# Patient Record
Sex: MALE | Race: WHITE | Employment: OTHER | URBAN - METROPOLITAN AREA
[De-identification: names, ages, dates, MRNs, and addresses within clinical notes are randomized per-mention and may not be internally consistent; named-entity substitution may affect disease eponyms.]

---

## 2017-12-25 PROBLEM — G93.40 ACUTE ENCEPHALOPATHY: Status: ACTIVE | Noted: 2017-12-25

## 2017-12-29 PROBLEM — M54.9 CVA TENDERNESS: Status: ACTIVE | Noted: 2017-12-29

## 2017-12-31 PROBLEM — R26.9 GAIT DISTURBANCE: Status: ACTIVE | Noted: 2017-12-31

## 2018-01-02 PROBLEM — F10.20 ALCOHOLISM (HCC): Status: ACTIVE | Noted: 2018-01-02

## 2018-01-19 ENCOUNTER — HOSPITAL ENCOUNTER (OUTPATIENT)
Dept: PHYSICAL THERAPY | Age: 58
Discharge: HOME | End: 2018-01-31
Attending: PHYSICAL MEDICINE & REHABILITATION | Admitting: PHYSICAL MEDICINE & REHABILITATION

## 2018-01-19 ENCOUNTER — HOSPITAL ENCOUNTER (OUTPATIENT)
Dept: OCCUPATIONAL THERAPY | Age: 58
Discharge: HOME | End: 2018-01-31
Attending: PHYSICAL MEDICINE & REHABILITATION | Admitting: PHYSICAL MEDICINE & REHABILITATION

## 2018-01-19 NOTE — FLOWSHEET NOTE
Physical Therapy Daily Treatment Note  Date:  2018    Patient Name:  Ramakrishna Bui    :  1960  MRN: [id number]  Restrictions/Precautions:  Fall risk  Diagnosis: ischemic stroke w/infarction  Treatment Diagnosis: ataxic gait, imp. function, left body weakness, imp. coordination  Insurance/Certification information:  [place]  30 pcy    G-Coded  Next Physician Visit: Unknown  Referring Physician:  Mohamud EDGAR EXPIRATION DATE:  12 visits  Visit# / total visits:                   Initial Pain level: 0/10       G-Code Selection: (On Eval and every 10th visit or Discharge)  Initial evaluation completed 18    MEASURE    [x] Mobility: Walking and Moving Around     [x] Current ()   [x] Goal ()   [] DC ()  [] Changing/Maintaining Body Position     [] Current (7449)      [] Goal ()   [] DC ()  [] Carrying / Moving / Handling Objects     [] Current ()   [] Goal ()   [] DC ()  [] Self-Care     [] Current ()   [] Goal ()   [] DC ()  [] Other PT/OT primary DX     [] Current ()   [] Goal ()   [] DC ()    SEVERITY    CURRENT  GOAL  DISCHARGE   [] CH (0% Impaired, Indep.)  [] CI (1-19% Impaired, SBA-CGA)  [] CJ (20-39% Impaired, MIN A)  [x] CK  (40-59% Impairment, Mod A)  [] CL  (60-79% Impairment, Max A)  [] CM  (80-99% Impairment, Dep.)   [] CN  (100% Impairment, Tot Dep.) [] CH (0% Impaired, Indep.)  [] CI (1-19% Impaired, SBA-CGA)  [x] CJ (20-39% Impaired, MIN A)  [] CK  (40-59% Impairment, Mod A)  [] CL  (60-79% Impairment, Max A)  [] CM  (80-99% Impairment, Dep.)   [] CN  (100% Impairment, Tot Dep.)  [] CH (0% Impaired, Indep.)  [] CI (1-19% Impaired, SBA-CGA)  [] CJ (20-39% Impaired, MIN A)  [] CK  (40-59% Impairment, Mod A)  [] CL  (60-79% Impairment, Max A)  [] CM  (80-99% Impairment, Dep.)   [] CN  (100% Impairment, Tot Dep.)            Subjective:   Patient had an ischemic stroke a couple days before [place], but

## 2018-01-19 NOTE — PROGRESS NOTES
Although there were no cognitive deficits identified during PT evaluation, there is report by family that judgement and STM are typically impaired. Patient will benefit from physical therapy for therex, gait/balance retraining, mobility/transfer training, HEP and education. Treatment Diagnosis: ataxic gait, imp. function, left body weakness, imp. coordination  Prognosis: Good  History: HTN, CVA, ETOH abuse  Exam: MMT, coordination, gait, balance  Clinical Presentation: Evolving  Patient Education: see daily note  Barriers to Learning: memory def  REQUIRES PT FOLLOW UP: Yes  Treatment Initiated : see daily note  Activity Tolerance  Activity Tolerance: Patient Tolerated treatment well         Plan   Plan  Times per week: 2x/wk  Plan weeks: 6 weeks or 12 visits  Current Treatment Recommendations: Strengthening, Functional Mobility Training, Balance Training, Transfer Training, ADL/Self-care Training, Stair training, Gait Training, Neuromuscular Re-education, ROM, Home Exercise Program, Safety Education & Training, Patient/Caregiver Education & Training, Equipment Evaluation, Education, & procurement  Safety Devices  Type of devices: Patient at risk for falls    G-Code  PT G-Codes  Functional Assessment Tool Used: Tinetti Gait and Balance  Score: 15/28 (46.5% disability)  Functional Limitation: Mobility: Walking and moving around  Mobility: Walking and Moving Around Current Status (): At least 40 percent but less than 60 percent impaired, limited or restricted  Mobility: Walking and Moving Around Goal Status (): At least 20 percent but less than 40 percent impaired, limited or restricted    Goals  Long term goals  Time Frame for Long term goals : In 6 weeks or 12 visits, patient will  Long term goal 1: demonstrate compliance and independence w/HEP. Long term goal 2: score b/t 19-23 or better on Tinetti Balance and Gait score.   Long term goal 3: ascend/descend (1) full flight of stairs, using (1) HR for

## 2018-01-19 NOTE — FLOWSHEET NOTE
[x][facility] [address]      FREDERICK PIZANO [facility] [address] [place] [address]       [facility] [address], [address]       [place] [address]     [phone] [phone] [phone]  ________________________________________________________________________  Occupational Therapy Daily Treatment Note    Date:  2018  Patient Name:  Yani Link    :  1960  Restrictions/Precautions:  Left visual field cut  Diagnosis:   CVA  Treatment Diagnosis:  lack of coordination  Insurance/Certification information:  [place] but in process of changing  Referring Physician:   Dr. Edouard Page of care signed (Y/N):    Visit# / total visits: 1/10  Pain level: 0/10     Subjective:   Prior Level of Function:  independent  Patient Goals: get back to living independently at own home    Treatment Flowsheet   Right Left            Completed eval                                                                                                                              Interventions/Modalities used:  [] Therapeutic Exercise   [] Modalities:  [] Therapeutic Activity    [] Ultrasound [] Elec Stimulation   [] Total Motion Release    [] Fluido [] Kinesiotaping  [] Neuromuscular Re-education   [] Ionto [] Coldpack/hotpack   [] Instruction in HEP    Other:    Objective Findings:    Communication with other providers:    Education provided to patient:    Adverse Reactions to treatment:    Timed Code Treatment Minutes: Total Treatment Minutes:      Treatment/Activity Tolerance:     []  Patient tolerated treatment well []  Patient limited by fatique    []  Patient limited by pain []  Patient limited by other medical complications   []  Other:     Goals:    Long term goal 1: Pt. will increase L  10# to open a jar. Long term goal 2: Pt. will increase L UE strength to 5/5 to perform activities of choice.   Long term goal 3: Pt. will

## 2018-01-19 NOTE — PROGRESS NOTES
Current Status (): At least 20 percent but less than 40 percent impaired, limited or restricted  Carrying, Moving and Handling Objects Goal Status (): At least 1 percent but less than 20 percent impaired, limited or restricted  OutComes Score  9 hole peg test    Goals  Long term goals  Time Frame for Long term goals : 4 weeks  Long term goal 1: Pt. will increase L  10# to open a jar. Long term goal 2: Pt. will increase L UE strength to 5/5 to perform activities of choice. Long term goal 3: Pt. will increase L hand coordination as evidenced by 9 hole peg test.  Long term goal 4: Pt. will perform homemaking tasks independently and safely to be able to return to own home independently.        Therapy Time   Individual Concurrent Group Co-treatment   Time In 9202         Time Out 1115         Minutes Sanjuana 1 OTR/L

## 2018-01-24 ENCOUNTER — HOSPITAL ENCOUNTER (OUTPATIENT)
Dept: OCCUPATIONAL THERAPY | Age: 58
Discharge: HOME | End: 2018-01-24
Admitting: PHYSICAL MEDICINE & REHABILITATION

## 2018-01-26 ENCOUNTER — HOSPITAL ENCOUNTER (OUTPATIENT)
Dept: OCCUPATIONAL THERAPY | Age: 58
Discharge: HOME | End: 2018-01-26
Admitting: PHYSICAL MEDICINE & REHABILITATION

## 2018-01-26 NOTE — FLOWSHEET NOTE
[x][facility] [address]      FREDERICK PIZANO [facility] [address]       [facility], [address], [address]       [place] [address]     [phone] [phone] [phone]  ________________________________________________________________________  Occupational Therapy Daily Treatment Note    Date:  2018  Patient Name:  Taylor Moncada    :  1960  Restrictions/Precautions:  Left visual field cut  Diagnosis:   CVA  Treatment Diagnosis:  lack of coordination  Insurance/Certification information:  [place] but in process of changing  Referring Physician:   Dr. Abdelrahman Shepard of care signed (Y/N):    Visit# / total visits: 3/10  Pain level: 0/10     Subjective: pt. States he is getting used to scanning his environment more to compensate for the L visual field cut  Prior Level of Function:  independent  Patient Goals: get back to living independently at own home    Treatment Flowsheet   Right Left          Worked on various therapeutic tasks focused on dynamic standing balance, L UE coordination and L visual scanning. Ball toss  Obstacle course  Balloon volleyball  Clothespins to yardstick            Pt.  Required occ'l min A to regain balance during obstacle course when stepping on dense foam and uneven surfaces              Pt. Also had min difficulty finding objects specifically positioned in his left visual field                                                                                    Interventions/Modalities used:  [x] Therapeutic Exercise   [] Modalities:  [x] Therapeutic Activity    [] Ultrasound [] Elec Stimulation   [] Total Motion Release    [] Fluido [] Kinesiotaping  [x] Neuromuscular Re-education   [] Ionto [] Coldpack/hotpack   [] Instruction in HEP    Other:    Objective Findings:    Communication with other providers:    Education provided to patient:    Adverse Reactions to

## 2018-01-31 ENCOUNTER — HOSPITAL ENCOUNTER (OUTPATIENT)
Dept: OCCUPATIONAL THERAPY | Age: 58
Discharge: HOME | End: 2018-01-31
Admitting: PHYSICAL MEDICINE & REHABILITATION

## 2018-01-31 NOTE — FLOWSHEET NOTE
[x][facility] [address]      FREDERICK [facility] [address]       [facility] [address], [address]       [place] [address]     [phone] [phone] [phone]  ________________________________________________________________________  Occupational Therapy Daily Treatment Note    Date:  2018  Patient Name:  Steph King    :  1960  Restrictions/Precautions:  Left visual field cut  Diagnosis:   CVA  Treatment Diagnosis:  lack of coordination  Insurance/Certification information:  [place] but in process of changing  Referring Physician:   Dr. Leora Darnell of care signed (Y/N):    Visit# / total visits: 4/10  Pain level: 0/10     Subjective: states his coffee pot malfunctioned and he used half a roll of paper towel cleaning up  Prior Level of Function:  independent  Patient Goals: get back to living independently at own home    Treatment Flowsheet   Right Left          Practiced folding clothes with min difficulty x x        Practiced putting polo shirts on hangers and hanging on rack with mod difficulty; demonstrated mod difficulty  With vision and perception x x        Matched puzzle pieces with game of perfection using L hand  x        Balloon raquetball  x                                                                                  Interventions/Modalities used:  [x] Therapeutic Exercise   [] Modalities:  [x] Therapeutic Activity    [] Ultrasound [] Elec Stimulation   [] Total Motion Release    [] Fluido [] Kinesiotaping  [x] Neuromuscular Re-education   [] Ionto [] Coldpack/hotpack   [] Instruction in HEP    Other:    Objective Findings:    Cues needed for left side awareness/ attention to detail    Communication with other providers:    Education provided to patient:    Adverse Reactions to treatment:    Timed Code Treatment Minutes:  45    Total Treatment Minutes: 45    Treatment/Activity Tolerance:     [x]  Patient tolerated treatment well []  Patient limited by fatique    []  Patient limited by pain []  Patient limited by other medical complications   []  Other:     Goals:    Long term goal 1: Pt. will increase L  10# to open a jar. Long term goal 2: Pt. will increase L UE strength to 5/5 to perform activities of choice. Long term goal 3: Pt. will increase L hand coordination as evidenced by 9 hole peg test.  Long term goal 4: Pt. will perform homemaking tasks independently and safely to be able to return to own home independently.      G-Code Selection: (On Eval and every 10th visit or Discharge)    MEASURE    [] Mobility: Walking and Moving Around     [] Current ()   [] Goal ()   [] DC ()  [] Changing/Maintaining Body Position     [] Current (0832)      [] Goal ()   [] DC ()  [x] Carrying / Moving / Handling Objects     [] Current ()   [] Goal ()   [] DC ()  [] Self-Care     [] Current ()   [] Goal ()   [] DC ()  [] Other PT/OT primary DX     [] Current ()   [] Goal ()   [] DC ()    SEVERITY    CURRENT  GOAL  DISCHARGE   [] CH (0% Impaired, Indep.)  [] CI (1-19% Impaired, SBA-CGA)  [x] CJ (20-39% Impaired, MIN A)  [] CK  (40-59% Impairment, Mod A)  [] CL  (60-79% Impairment, Max A)  [] CM  (80-99% Impairment, Dep.)   [] CN  (100% Impairment, Tot Dep.) [] CH (0% Impaired, Indep.)  [x] CI (1-19% Impaired, SBA-CGA)  [] CJ (20-39% Impaired, MIN A)  [] CK  (40-59% Impairment, Mod A)  [] CL  (60-79% Impairment, Max A)  [] CM  (80-99% Impairment, Dep.)   [] CN  (100% Impairment, Tot Dep.)  [] CH (0% Impaired, Indep.)  [] CI (1-19% Impaired, SBA-CGA)  [] CJ (20-39% Impaired, MIN A)  [] CK  (40-59% Impairment, Mod A)  [] CL  (60-79% Impairment, Max A)  [] CM  (80-99% Impairment, Dep.)   [] CN  (100% Impairment, Tot Dep.)          Patient Requires Follow-up:  [x]  Yes  []  No    Plan: [x]  Continue per plan

## 2018-02-01 ENCOUNTER — HOSPITAL ENCOUNTER (OUTPATIENT)
Dept: OTHER | Age: 58
Discharge: HOME | End: 2018-02-28
Attending: PHYSICAL MEDICINE & REHABILITATION | Admitting: PHYSICAL MEDICINE & REHABILITATION

## 2018-02-02 ENCOUNTER — HOSPITAL ENCOUNTER (OUTPATIENT)
Dept: PHYSICAL THERAPY | Age: 58
Discharge: HOME | End: 2018-02-02
Admitting: PHYSICAL MEDICINE & REHABILITATION

## 2018-02-02 ENCOUNTER — HOSPITAL ENCOUNTER (OUTPATIENT)
Dept: OCCUPATIONAL THERAPY | Age: 58
Discharge: HOME | End: 2018-02-02
Admitting: PHYSICAL MEDICINE & REHABILITATION

## 2018-02-05 ENCOUNTER — OFFICE VISIT (OUTPATIENT)
Dept: FAMILY MEDICINE CLINIC | Age: 58
End: 2018-02-05

## 2018-02-05 VITALS
BODY MASS INDEX: 21.17 KG/M2 | WEIGHT: 124 LBS | OXYGEN SATURATION: 98 % | HEART RATE: 95 BPM | HEIGHT: 64 IN | TEMPERATURE: 97.5 F | SYSTOLIC BLOOD PRESSURE: 120 MMHG | DIASTOLIC BLOOD PRESSURE: 72 MMHG

## 2018-02-05 DIAGNOSIS — Z76.89 ESTABLISHING CARE WITH NEW DOCTOR, ENCOUNTER FOR: ICD-10-CM

## 2018-02-05 DIAGNOSIS — Z87.891 FORMER SMOKER: ICD-10-CM

## 2018-02-05 DIAGNOSIS — E83.42 HYPOMAGNESEMIA: Primary | ICD-10-CM

## 2018-02-05 DIAGNOSIS — F10.20 ALCOHOLISM (HCC): ICD-10-CM

## 2018-02-05 DIAGNOSIS — D64.9 ANEMIA, UNSPECIFIED TYPE: ICD-10-CM

## 2018-02-05 LAB
BASOPHILS ABSOLUTE: 0.1 K/UL (ref 0–0.2)
BASOPHILS RELATIVE PERCENT: 0.7 %
EOSINOPHILS ABSOLUTE: 0.2 K/UL (ref 0–0.6)
EOSINOPHILS RELATIVE PERCENT: 1.4 %
HCT VFR BLD CALC: 36 % (ref 40.5–52.5)
HEMOGLOBIN: 12 G/DL (ref 13.5–17.5)
LYMPHOCYTES ABSOLUTE: 2.3 K/UL (ref 1–5.1)
LYMPHOCYTES RELATIVE PERCENT: 21.4 %
MCH RBC QN AUTO: 33.1 PG (ref 26–34)
MCHC RBC AUTO-ENTMCNC: 33.5 G/DL (ref 31–36)
MCV RBC AUTO: 98.8 FL (ref 80–100)
MONOCYTES ABSOLUTE: 1.1 K/UL (ref 0–1.3)
MONOCYTES RELATIVE PERCENT: 9.8 %
NEUTROPHILS ABSOLUTE: 7.2 K/UL (ref 1.7–7.7)
NEUTROPHILS RELATIVE PERCENT: 66.7 %
PDW BLD-RTO: 13.1 % (ref 12.4–15.4)
PLATELET # BLD: 412 K/UL (ref 135–450)
PMV BLD AUTO: 8.4 FL (ref 5–10.5)
RBC # BLD: 3.64 M/UL (ref 4.2–5.9)
WBC # BLD: 10.8 K/UL (ref 4–11)

## 2018-02-05 PROCEDURE — G8598 ASA/ANTIPLAT THER USED: HCPCS | Performed by: FAMILY MEDICINE

## 2018-02-05 PROCEDURE — G8420 CALC BMI NORM PARAMETERS: HCPCS | Performed by: FAMILY MEDICINE

## 2018-02-05 PROCEDURE — 1036F TOBACCO NON-USER: CPT | Performed by: FAMILY MEDICINE

## 2018-02-05 PROCEDURE — G8427 DOCREV CUR MEDS BY ELIG CLIN: HCPCS | Performed by: FAMILY MEDICINE

## 2018-02-05 PROCEDURE — 36415 COLL VENOUS BLD VENIPUNCTURE: CPT | Performed by: FAMILY MEDICINE

## 2018-02-05 PROCEDURE — 1111F DSCHRG MED/CURRENT MED MERGE: CPT | Performed by: FAMILY MEDICINE

## 2018-02-05 PROCEDURE — 99204 OFFICE O/P NEW MOD 45 MIN: CPT | Performed by: FAMILY MEDICINE

## 2018-02-05 PROCEDURE — 3017F COLORECTAL CA SCREEN DOC REV: CPT | Performed by: FAMILY MEDICINE

## 2018-02-05 PROCEDURE — G8484 FLU IMMUNIZE NO ADMIN: HCPCS | Performed by: FAMILY MEDICINE

## 2018-02-05 RX ORDER — ATORVASTATIN CALCIUM 40 MG/1
40 TABLET, FILM COATED ORAL NIGHTLY
Qty: 90 TABLET | Refills: 0 | Status: SHIPPED | OUTPATIENT
Start: 2018-02-05 | End: 2018-05-07 | Stop reason: SDUPTHER

## 2018-02-05 RX ORDER — FLUOXETINE 10 MG/1
10 CAPSULE ORAL DAILY
Qty: 90 CAPSULE | Refills: 0 | Status: SHIPPED | OUTPATIENT
Start: 2018-02-05 | End: 2018-05-04 | Stop reason: SDUPTHER

## 2018-02-05 ASSESSMENT — ENCOUNTER SYMPTOMS
NAUSEA: 0
COUGH: 0
ABDOMINAL PAIN: 0
DIARRHEA: 0
SHORTNESS OF BREATH: 0
VOMITING: 0

## 2018-02-05 NOTE — PROGRESS NOTES
Abdominal: Soft. Bowel sounds are normal. He exhibits no mass. There is no tenderness. Musculoskeletal: He exhibits no edema. Lymphadenopathy:     He has no cervical adenopathy. Neurological: He is alert and oriented to person, place, and time. He has normal reflexes. No cranial nerve deficit. Strength testing: Right side of body is normal.  Left side of body shows weakness of triceps hip flexors and extenders and knee extenders and flexors. These weaknesses are all about 4/5. Psychiatric: He has a normal mood and affect. Nursing note and vitals reviewed. Assessment:      1. Hypomagnesemia  COMPREHENSIVE METABOLIC PANEL    MAGNESIUM   2. Hemiparesis of left nondominant side due to cerebral infarction (HCC)  COMPREHENSIVE METABOLIC PANEL    atorvastatin (LIPITOR) 40 MG tablet    FLUoxetine (PROZAC) 10 MG capsule    External Referral To Neurology   3. Anemia, unspecified type  CBC WITH AUTO DIFFERENTIAL    IRON AND TIBC    VITAMIN B12 & FOLATE   4. Alcoholism (HCC)  COMPREHENSIVE METABOLIC PANEL    IRON AND TIBC    VITAMIN B12 & FOLATE   5. Former smoker     6. Establishing care with new doctor, encounter for             Plan:      1. Magnesium level obtained. Further care pending results. 2.  Discussed secondary prevention versus cholesterol management for atorvastatin. He will continue on atorvastatin definitely. Lipid levels only need to be done at most once a year. LFTs done today to make sure no liver irritation from his chronic alcoholism and new atorvastatin prescription. Continue baby aspirin daily. We'll refer to neurology for further evaluation. Patient is pursuing disability given his left-sided issues soon he will be on to get this. 3.  Labs to evaluate level of anemia and potential causes. If the R75 and folic acid truly are low normal vitamin prior auth his Foltx. 4.  Currently in remission he was encouraged to continue so.   My concern is once he is back in to his old home with the remain abstinent. 5.  States he only smokes when he drinks. So for now he is not smoking. We'll continue to follow this. I congratulated him on smoking cessation. 6.  Visit to establish care.     Follow-up: 3 months, CVA, alcohol use, smoking

## 2018-02-06 LAB
A/G RATIO: 1.6 (ref 1.1–2.2)
ALBUMIN SERPL-MCNC: 4.2 G/DL (ref 3.4–5)
ALP BLD-CCNC: 116 U/L (ref 40–129)
ALT SERPL-CCNC: 11 U/L (ref 10–40)
ANION GAP SERPL CALCULATED.3IONS-SCNC: 16 MMOL/L (ref 3–16)
AST SERPL-CCNC: 17 U/L (ref 15–37)
BILIRUB SERPL-MCNC: 0.5 MG/DL (ref 0–1)
BUN BLDV-MCNC: 7 MG/DL (ref 7–20)
CALCIUM SERPL-MCNC: 9.8 MG/DL (ref 8.3–10.6)
CHLORIDE BLD-SCNC: 94 MMOL/L (ref 99–110)
CO2: 27 MMOL/L (ref 21–32)
CREAT SERPL-MCNC: 0.8 MG/DL (ref 0.9–1.3)
FOLATE: >20 NG/ML (ref 4.78–24.2)
GFR AFRICAN AMERICAN: >60
GFR NON-AFRICAN AMERICAN: >60
GLOBULIN: 2.6 G/DL
GLUCOSE BLD-MCNC: 86 MG/DL (ref 70–99)
IRON SATURATION: 28 % (ref 20–50)
IRON: 64 UG/DL (ref 59–158)
MAGNESIUM: 2 MG/DL (ref 1.8–2.4)
POTASSIUM SERPL-SCNC: 4.9 MMOL/L (ref 3.5–5.1)
SODIUM BLD-SCNC: 137 MMOL/L (ref 136–145)
TOTAL IRON BINDING CAPACITY: 229 UG/DL (ref 260–445)
TOTAL PROTEIN: 6.8 G/DL (ref 6.4–8.2)
VITAMIN B-12: 455 PG/ML (ref 211–911)

## 2018-02-07 ENCOUNTER — HOSPITAL ENCOUNTER (OUTPATIENT)
Dept: OCCUPATIONAL THERAPY | Age: 58
Discharge: HOME | End: 2018-02-07
Admitting: PHYSICAL MEDICINE & REHABILITATION

## 2018-02-07 NOTE — FLOWSHEET NOTE
treatment well []  Patient limited by fatique    []  Patient limited by pain []  Patient limited by other medical complications   []  Other:     Goals:    Long term goal 1: Pt. will increase L  10# to open a jar. Long term goal 2: Pt. will increase L UE strength to 5/5 to perform activities of choice. Long term goal 3: Pt. will increase L hand coordination as evidenced by 9 hole peg test.  Long term goal 4: Pt. will perform homemaking tasks independently and safely to be able to return to own home independently.      G-Code Selection: (On Eval and every 10th visit or Discharge)    MEASURE    [] Mobility: Walking and Moving Around     [] Current ()   [] Goal ()   [] DC ()  [] Changing/Maintaining Body Position     [] Current (6379)      [] Goal ()   [] DC ()  [x] Carrying / Moving / Handling Objects     [] Current ()   [] Goal ()   [] DC ()  [] Self-Care     [] Current ()   [] Goal ()   [] DC ()  [] Other PT/OT primary DX     [] Current ()   [] Goal ()   [] DC ()    SEVERITY    CURRENT  GOAL  DISCHARGE   [] CH (0% Impaired, Indep.)  [] CI (1-19% Impaired, SBA-CGA)  [x] CJ (20-39% Impaired, MIN A)  [] CK  (40-59% Impairment, Mod A)  [] CL  (60-79% Impairment, Max A)  [] CM  (80-99% Impairment, Dep.)   [] CN  (100% Impairment, Tot Dep.) [] CH (0% Impaired, Indep.)  [x] CI (1-19% Impaired, SBA-CGA)  [] CJ (20-39% Impaired, MIN A)  [] CK  (40-59% Impairment, Mod A)  [] CL  (60-79% Impairment, Max A)  [] CM  (80-99% Impairment, Dep.)   [] CN  (100% Impairment, Tot Dep.)  [] CH (0% Impaired, Indep.)  [] CI (1-19% Impaired, SBA-CGA)  [] CJ (20-39% Impaired, MIN A)  [] CK  (40-59% Impairment, Mod A)  [] CL  (60-79% Impairment, Max A)  [] CM  (80-99% Impairment, Dep.)   [] CN  (100% Impairment, Tot Dep.)          Patient Requires Follow-up:  [x]  Yes  []  No    Plan: [x]  Continue per plan of care []  Alter current plan (see comments)   []  Plan of

## 2018-02-09 ENCOUNTER — HOSPITAL ENCOUNTER (OUTPATIENT)
Dept: PHYSICAL THERAPY | Age: 58
Discharge: HOME | End: 2018-02-09
Admitting: PHYSICAL MEDICINE & REHABILITATION

## 2018-02-09 ENCOUNTER — HOSPITAL ENCOUNTER (OUTPATIENT)
Dept: OCCUPATIONAL THERAPY | Age: 58
Discharge: HOME | End: 2018-02-09
Admitting: PHYSICAL MEDICINE & REHABILITATION

## 2018-02-09 NOTE — FLOWSHEET NOTE
a little sore today. AT EVAL:  Patient had an ischemic stroke a couple days before [place], but unable to recall any details. Family attempted getting ahold of him for a couple days and unable to contact patient. Then they knew something was wrong. When family entered the house, patient was on the floor having a seizure. Pt has not had strokes in the past/acute onset. Patient lived alone and was independent w/adls, mobility and self-care, drove although brother confided to therapist that patient has been heavily drinking for the last three years. Per pt's brother, patient is actually walking better now than he was because he is not currently drinking. Currently, living w/brother and not driving. Family is provinding 24 hr supervision. Most difficulty is left body weakness/neglect and dec STM. Has lost some left peripheral vision  Any changes to Ambulatory Summary Sheet? Goals:     Long term goals  Time Frame for Long term goals : In 6 weeks or 12 visits, patient will  Long term goal 1: demonstrate compliance and independence w/HEP. Long term goal 2: score b/t 19-23 or better on Tinetti Balance and Gait score. Long term goal 3: ascend/descend (1) full flight of stairs, using (1) HR for light support, reciprocal pattern Mod Ind. Long term goal 4: ambulate community surfaces w/o AD Indly (>= 1,000 feet). Patient's goal: Return home independently    Skilled PT activities: Date 1/19/18  #1 Date 1-31-18 #4 2/02/18 #5 2/07/18 #6 2/9/18 #7     Outcome measure used          On visit# Cornelius  15/28                 Home Management/Self-care   Reviewed POC, patient goals. Patient wants to regain all function to at least prior level to allow him to return home independently. Spoke w/brother regarding hx ETOH and it's impact on patient's life/function. Currently, patient is not drinking. There is concern about return to alcohol when no longer with family.                  Gait   Gait

## 2018-02-12 ENCOUNTER — TELEPHONE (OUTPATIENT)
Dept: FAMILY MEDICINE CLINIC | Age: 58
End: 2018-02-12

## 2018-02-13 ENCOUNTER — TELEPHONE (OUTPATIENT)
Dept: FAMILY MEDICINE CLINIC | Age: 58
End: 2018-02-13

## 2018-02-13 NOTE — TELEPHONE ENCOUNTER
What does he want to go to Dr. Lourdes Bush for? He is a pulminologist. He was referred to a new neurologist yesterday. 2/13/2018  1:18 PM Elissa Vidal MD Patient Calls    Comment: His insurance approves Dr. Alejandrina Truong, could you please send referral to his office and please use brother's(POA) phone # as contact number.

## 2018-02-14 ENCOUNTER — HOSPITAL ENCOUNTER (OUTPATIENT)
Dept: OCCUPATIONAL THERAPY | Age: 58
Discharge: HOME | End: 2018-02-14
Admitting: PHYSICAL MEDICINE & REHABILITATION

## 2018-02-14 NOTE — FLOWSHEET NOTE
[x]  Patient tolerated treatment well []  Patient limited by fatique    []  Patient limited by pain []  Patient limited by other medical complications   []  Other:     Goals:    Long term goal 1: Pt. will increase L  10# to open a jar. Long term goal 2: Pt. will increase L UE strength to 5/5 to perform activities of choice. Long term goal 3: Pt. will increase L hand coordination as evidenced by 9 hole peg test.  Long term goal 4: Pt. will perform homemaking tasks independently and safely to be able to return to own home independently.      G-Code Selection: (On Eval and every 10th visit or Discharge)    MEASURE    [] Mobility: Walking and Moving Around     [] Current ()   [] Goal ()   [] DC ()  [] Changing/Maintaining Body Position     [] Current (0953)      [] Goal ()   [] DC ()  [x] Carrying / Moving / Handling Objects     [] Current ()   [] Goal ()   [] DC ()  [] Self-Care     [] Current ()   [] Goal ()   [] DC ()  [] Other PT/OT primary DX     [] Current ()   [] Goal ()   [] DC ()    SEVERITY    CURRENT  GOAL  DISCHARGE   [] CH (0% Impaired, Indep.)  [] CI (1-19% Impaired, SBA-CGA)  [x] CJ (20-39% Impaired, MIN A)  [] CK  (40-59% Impairment, Mod A)  [] CL  (60-79% Impairment, Max A)  [] CM  (80-99% Impairment, Dep.)   [] CN  (100% Impairment, Tot Dep.) [] CH (0% Impaired, Indep.)  [x] CI (1-19% Impaired, SBA-CGA)  [] CJ (20-39% Impaired, MIN A)  [] CK  (40-59% Impairment, Mod A)  [] CL  (60-79% Impairment, Max A)  [] CM  (80-99% Impairment, Dep.)   [] CN  (100% Impairment, Tot Dep.)  [] CH (0% Impaired, Indep.)  [] CI (1-19% Impaired, SBA-CGA)  [] CJ (20-39% Impaired, MIN A)  [] CK  (40-59% Impairment, Mod A)  [] CL  (60-79% Impairment, Max A)  [] CM  (80-99% Impairment, Dep.)   [] CN  (100% Impairment, Tot Dep.)          Patient Requires Follow-up:  [x]  Yes  []  No    Plan: [x]  Continue per plan of care []  Alter current plan (see

## 2018-02-20 ENCOUNTER — OFFICE VISIT (OUTPATIENT)
Dept: FAMILY MEDICINE CLINIC | Age: 58
End: 2018-02-20

## 2018-02-20 VITALS
BODY MASS INDEX: 21.42 KG/M2 | WEIGHT: 124.8 LBS | DIASTOLIC BLOOD PRESSURE: 70 MMHG | SYSTOLIC BLOOD PRESSURE: 122 MMHG | HEART RATE: 76 BPM | TEMPERATURE: 98.2 F | OXYGEN SATURATION: 98 %

## 2018-02-20 DIAGNOSIS — M79.89 SWELLING OF RIGHT HAND: Primary | ICD-10-CM

## 2018-02-20 PROCEDURE — G8484 FLU IMMUNIZE NO ADMIN: HCPCS | Performed by: FAMILY MEDICINE

## 2018-02-20 PROCEDURE — G8428 CUR MEDS NOT DOCUMENT: HCPCS | Performed by: FAMILY MEDICINE

## 2018-02-20 PROCEDURE — G8598 ASA/ANTIPLAT THER USED: HCPCS | Performed by: FAMILY MEDICINE

## 2018-02-20 PROCEDURE — 99213 OFFICE O/P EST LOW 20 MIN: CPT | Performed by: FAMILY MEDICINE

## 2018-02-20 PROCEDURE — 1036F TOBACCO NON-USER: CPT | Performed by: FAMILY MEDICINE

## 2018-02-20 PROCEDURE — G8420 CALC BMI NORM PARAMETERS: HCPCS | Performed by: FAMILY MEDICINE

## 2018-02-20 PROCEDURE — 3017F COLORECTAL CA SCREEN DOC REV: CPT | Performed by: FAMILY MEDICINE

## 2018-02-21 ENCOUNTER — HOSPITAL ENCOUNTER (OUTPATIENT)
Dept: OCCUPATIONAL THERAPY | Age: 58
Discharge: HOME | End: 2018-02-21
Admitting: PHYSICAL MEDICINE & REHABILITATION

## 2018-02-21 NOTE — FLOWSHEET NOTE
plan (see comments)   []  Plan of care initiated []  Hold pending MD visit []  Discharge    Plan for Next Session:      Electronically signed by:  ANABELLE Harris/L, 2/21/2018, 2:25 PM

## 2018-02-22 ENCOUNTER — TELEPHONE (OUTPATIENT)
Dept: FAMILY MEDICINE CLINIC | Age: 58
End: 2018-02-22

## 2018-02-23 ENCOUNTER — HOSPITAL ENCOUNTER (OUTPATIENT)
Dept: PHYSICAL THERAPY | Age: 58
Discharge: HOME | End: 2018-02-23
Admitting: PHYSICAL MEDICINE & REHABILITATION

## 2018-02-23 ENCOUNTER — HOSPITAL ENCOUNTER (OUTPATIENT)
Dept: OCCUPATIONAL THERAPY | Age: 58
Discharge: HOME | End: 2018-02-23
Admitting: PHYSICAL MEDICINE & REHABILITATION

## 2018-02-23 NOTE — FLOWSHEET NOTE
[x][facility] [address]      FREDERICK PIZANO [facility] [address]       [facility], [address], [address]       [place] [address]     [phone] [phone] [phone]  ________________________________________________________________________  Occupational Therapy Daily Treatment Note    Date:  2018  Patient Name:  Chavez Escalante    :  1960  Restrictions/Precautions:  Left visual field cut  Diagnosis:   CVA  Treatment Diagnosis:  lack of coordination  Insurance/Certification information:  [place] but in process of changing  Referring Physician:   Dr. Anastacia Baer of care signed (Y/N):    Visit# / total visits:10/10  Pain level: 0/10     Subjective: pt. Swelling in R hand is improving  Prior Level of Function:  independent  Patient Goals: get back to living independently at own home    Treatment Flowsheet   Right Left        Functional tasks focused on visual perceptual tasks: putting towels in laundry hamper, taking laundry bag out/in of hamper, putting laundry bag in laundry bin.  x        Folding clothes and putting shirts on hangers with max difficulty  x        Shoulder arc  x        Standing up dominoes on end for L hand coordination and visual perception with extra time needed  x                                                                    Interventions/Modalities used:  [x] Therapeutic Exercise   [] Modalities:  [x] Therapeutic Activity    [] Ultrasound [] Elec Stimulation   [] Total Motion Release    [] Fluido [] Kinesiotaping  [x] Neuromuscular Re-education   [] Ionto [] Coldpack/hotpack   [] Instruction in HEP    Other:    Objective Findings:   Pt.  Continues to miss items on left side    Communication with other providers:    Education provided to patient:    Adverse Reactions to treatment:    Timed Code Treatment Minutes:  45  Total Treatment Minutes:  45    Treatment/Activity

## 2018-02-23 NOTE — FLOWSHEET NOTE
Physical Therapy Daily Treatment Note  Date:  2018    Patient Name:  Jesus Green    :  1960  MRN: [id number]  Restrictions/Precautions:  Fall risk  Diagnosis: ischemic stroke w/infarction  Treatment Diagnosis: ataxic gait, imp. function, left body weakness, imp. coordination  Insurance/Certification information:  [place]  30 pcy    G-Coded  Next Physician Visit: Unknown  Referring Physician:  Agueda EDGAR EXPIRATION DATE:  12 visits  Visit# / total visits: 10 /12                  Initial Pain level:  3/10 right hand at rest, 6/10 making a fist       G-Code Selection: (On Eval and every 10th visit or Discharge)  Initial evaluation completed 18  9th visit completed 18    MEASURE    [x] Mobility: Walking and Moving Around     [x] Current ()   [x] Goal ()   [] DC ()  [] Changing/Maintaining Body Position     [] Current (8981)      [] Goal ()   [] DC ()  [] Carrying / Moving / Handling Objects     [] Current ()   [] Goal ()   [] DC ()  [] Self-Care     [] Current ()   [] Goal ()   [] DC ()  [] Other PT/OT primary DX     [] Current ()   [] Goal ()   [] DC ()    SEVERITY    CURRENT  GOAL  DISCHARGE   [] CH (0% Impaired, Indep.)  [x] CI (1-19% Impaired, SBA-CGA)  [] CJ (20-39% Impaired, MIN A)  [] CK  (40-59% Impairment, Mod A)  [] CL  (60-79% Impairment, Max A)  [] CM  (80-99% Impairment, Dep.)   [] CN  (100% Impairment, Tot Dep.) [] CH (0% Impaired, Indep.)  [] CI (1-19% Impaired, SBA-CGA)  [x] CJ (20-39% Impaired, MIN A)  [] CK  (40-59% Impairment, Mod A)  [] CL  (60-79% Impairment, Max A)  [] CM  (80-99% Impairment, Dep.)   [] CN  (100% Impairment, Tot Dep.)  [] CH (0% Impaired, Indep.)  [] CI (1-19% Impaired, SBA-CGA)  [] CJ (20-39% Impaired, MIN A)  [] CK  (40-59% Impairment, Mod A)  [] CL  (60-79% Impairment, Max A)  [] CM  (80-99% Impairment, Dep.)   [] CN  (100% Impairment, Tot Dep.) large cones spaced 5 ft apart  Supervision and vc for left side attention/observation    Reciprocal tap steps  4\" step while standing on 3\" blue foam  Close supervision to CGA d/t left toe catch. 6\" step while standing on 3\" blut foam  CGA to close supervision; improved w/practice  Above w/light hand support at rails approx 50% of the time. 2 x 10 each activity. Balance board   S/s ,a/p         Rebounder   Ball toss level floor x 20 reps  Supervision    Ball toss on 3\" blue foam x 20 reps  Close supervision     Shuttle         Cybex      Trunk rotation/oblique abs  35#  R/L  2 x 10  vc for control     Progress/goals assessment (every 10 visits) by  PT     Long term goal 1: demonstrate compliance and independence w/HEP. - Questionable compliance 2/21/18  Long term goal 2: score b/t 19-23 or better on Tinetti Balance and Gait score. - MET w/room for improvement 2/21/18  Long term goal 3: ascend/descend (1) full flight of stairs, using (1) HR for light support, reciprocal pattern Mod Ind. - UNMET 2/21/18  Long term goal 4: ambulate community surfaces w/o AD Indly (>= 1,000 feet). - NT/UNMET 2/21/18    Good progress w/continued improvement expected. HEP:  continue Review HEP next visit March in place  2 x 10  Side stepping R/L  2 lengths  Heel raises  2 x 10  Mini squats  2 x 10  HO's printed; patient left before getting them. In chart. Objective   findings: See initial eval Improved balance w/above activities. Continued left visual neglect    Wide URI w/gait    Dec balance w/retro-gait. Tinetti Score improved from 15/28 to 24/28  Low risk of falls    Continued left visual neglect and left body attention Left visual neglect/dec proprioception    Wide URI   Provider interaction:  See above Monitoring to ensure safe and effective activity performance Monitoring to ensure safe and effective activity performance Monitoring to ensure safe and effective activity performance   Response to intervention:    Ning Coughlin

## 2018-02-28 ENCOUNTER — HOSPITAL ENCOUNTER (OUTPATIENT)
Dept: PHYSICAL THERAPY | Age: 58
Discharge: HOME | End: 2018-02-28
Admitting: PHYSICAL MEDICINE & REHABILITATION

## 2018-02-28 ENCOUNTER — HOSPITAL ENCOUNTER (OUTPATIENT)
Dept: OCCUPATIONAL THERAPY | Age: 58
Discharge: HOME | End: 2018-02-28
Admitting: PHYSICAL MEDICINE & REHABILITATION

## 2018-02-28 NOTE — FLOWSHEET NOTE
[x][facility] [address]      [facility] [address] [place] [address]       [facility], [address]       [place] [address]     [phone] [phone] [phone]  ________________________________________________________________________  Occupational Therapy Daily Treatment Note    Date:  2018  Patient Name:  Earline Berumen    :  1960  Restrictions/Precautions:  Left visual field cut  Diagnosis:   CVA  Treatment Diagnosis:  lack of coordination  Insurance/Certification information:  [place] but in process of changing  Referring Physician:   Dr. Suzie Capps of care signed (Y/N):    Visit# / total visits:11/10  Pain level: 0/10     Subjective: swelling in R hand is down except for index finger; pt. States he snagged a fingernail in L hand and ripped it off this morning  Prior Level of Function:  independent  Patient Goals: get back to living independently at own home    Treatment Flowsheet   Right Left        Fine motor and visual perceptual tasks with L UE: stacking blocks in color coordinated  Stacks and putting pegs in pegboard in color coordinated rows. Bean bag toss into box on mat table approx. 6 ft. Away. Pt. Hit box ~ 50% of the time. Retrieved own ice and water out of ice machine                                                                                Interventions/Modalities used:  [x] Therapeutic Exercise   [] Modalities:  [x] Therapeutic Activity    [] Ultrasound [] Elec Stimulation   [] Total Motion Release    [] Fluido [] Kinesiotaping  [x] Neuromuscular Re-education   [] Ionto [] Coldpack/hotpack   [] Instruction in HEP    Other:    Objective Findings:   Pt.  Continues to miss items on left side    Communication with other providers:    Education provided to patient:    Adverse Reactions to treatment:    Timed Code Treatment Minutes:  45  Total Treatment Continue per plan of care []  Alter current plan (see comments)   []  Plan of care initiated []  Hold pending MD visit []  Discharge    Plan for Next Session:      Electronically signed by:  ANABELLE Harris/L, 2/28/2018, 2:41 PM

## 2018-02-28 NOTE — FLOWSHEET NOTE
Subjective: P states that his L index finger is swollen and isn't sure if it was a bug bite. Reports NKI. AT EVAL:  Patient had an ischemic stroke a couple days before Sean, but unable to recall any details. Family attempted getting ahold of him for a couple days and unable to contact patient. Then they knew something was wrong. When family entered the house, patient was on the floor having a seizure. Pt has not had strokes in the past/acute onset. Patient lived alone and was independent w/adls, mobility and self-care, drove although brother confided to therapist that patient has been heavily drinking for the last three years. Per pt's brother, patient is actually walking better now than he was because he is not currently drinking. Currently, living w/brother and not driving. Family is provinding 24 hr supervision. Most difficulty is left body weakness/neglect and dec STM. Has lost some left peripheral vision  Any changes to Ambulatory Summary Sheet? No             Goals:     Long term goals  Time Frame for Long term goals : In 6 weeks or 12 visits, patient will  Long term goal 1: demonstrate compliance and independence w/HEP. - Questionable compliance 2/21/18  Long term goal 2: score b/t 19-23 or better on Tinetti Balance and Gait score. - MET w/room for improvement 2/21/18  Long term goal 3: ascend/descend (1) full flight of stairs, using (1) HR for light support, reciprocal pattern Mod Ind. - UNMET 2/21/18  Long term goal 4: ambulate community surfaces w/o AD Indly (>= 1,000 feet). - NT/UNMET 2/21/18      Patient's goal: Return home independently    Skilled PT activities: Date 1/19/18  #1 2/14/18 #8 2/21/18 #9 2/23/18 #10 2/28/18 #11     Outcome measure used          On visit# Cornelius  15/28  Tinetti  24/28 - low risk for falls               Home Management/Self-care   Reviewed POC, patient goals.   Patient wants to regain all function to at least prior level to allow him to return home Review HEP next visit March in place  2 x 10  Side stepping R/L  2 lengths  Heel raises  2 x 10  Mini squats  2 x 10  HO's printed; patient left before getting them. In chart. cont   Objective   findings: See initial eval Improved balance w/above activities. Continued left visual neglect    Wide URI w/gait    Dec balance w/retro-gait. Tinetti Score improved from 15/28 to 24/28  Low risk of falls    Continued left visual neglect and left body attention Left visual neglect/dec proprioception    Wide URI Wide URI   Provider interaction:  See above Monitoring to ensure safe and effective activity performance Monitoring to ensure safe and effective activity performance Monitoring to ensure safe and effective activity performance Monitoring to ensure safe and effective activity performance   Response to intervention: Claudio well. No fatigue. No pain. Good to G- endurance and balance Good endurance Good endurance No fatigue, pt had OT after PT tx   Plan for Next Session: Gait/balance  LLE strengthening  (defer LUE to OT) Gait/balance  LLE strengthening/coordination/proprioception  (defer LUE to OT) Gait/balance  LLE strengthening/coordination/proprioceptio    Review and detail HEP.  Gait/balance  LLE strengthening/coordination/proprioceptio    Review and detail HEP - advance prn Gait/balance  LLE strengthening/coordination/proprioceptio         Modality/intervention used:  [x] Therapeutic Exercise  [] Modalities:  [] Therapeutic Activity     [] Ultrasound  [] Elec  Stim  [] Gait Training      [] Cervical Traction [] Lumbar Traction  [x] Neuromuscular Re-education    [] Cold/hotpack [] Iontophoresis   [] Instruction in HEP      [] Vasopneumatic     [] Manual Therapy               [] Self care home management                    (    ) Dry needling    Post Tx Pain Rating: no change    Plan:(Fequency/duration/# of visits)   2x/wk x 6 weeks or 12 visits    [x] Continue per plan of care [] Alter current plan   [] Plan of care initiated [] Hold pending MD visit [] Discharge         Time In: 0691  Time Out: 1410  Timed Code Treatment Minutes:  55  Total Treatment Minutes: 55    Electronically signed by:  Danny Barreto,  2/28/2018, 1:13 PM

## 2018-03-01 ENCOUNTER — HOSPITAL ENCOUNTER (OUTPATIENT)
Dept: OTHER | Age: 58
Discharge: HOME | End: 2018-03-31
Attending: PHYSICAL MEDICINE & REHABILITATION | Admitting: PHYSICAL MEDICINE & REHABILITATION

## 2018-03-02 ENCOUNTER — HOSPITAL ENCOUNTER (OUTPATIENT)
Dept: OCCUPATIONAL THERAPY | Age: 58
Discharge: HOME | End: 2018-03-02
Admitting: PHYSICAL MEDICINE & REHABILITATION

## 2018-03-05 ENCOUNTER — HOSPITAL ENCOUNTER (OUTPATIENT)
Dept: SPEECH THERAPY | Age: 58
Discharge: HOME | End: 2018-03-31
Attending: PHYSICAL MEDICINE & REHABILITATION | Admitting: PHYSICAL MEDICINE & REHABILITATION

## 2018-03-05 DIAGNOSIS — I63.411 CEREBRAL INFARCTION DUE TO EMBOLISM OF RIGHT MIDDLE CEREBRAL ARTERY (HCC): Primary | ICD-10-CM

## 2018-03-07 ENCOUNTER — HOSPITAL ENCOUNTER (OUTPATIENT)
Dept: OCCUPATIONAL THERAPY | Age: 58
Discharge: HOME | End: 2018-03-07
Admitting: PHYSICAL MEDICINE & REHABILITATION

## 2018-03-08 ENCOUNTER — HOSPITAL ENCOUNTER (OUTPATIENT)
Dept: SPEECH THERAPY | Age: 58
Discharge: HOME | End: 2018-03-08
Admitting: PHYSICAL MEDICINE & REHABILITATION

## 2018-03-08 NOTE — FLOWSHEET NOTE
session. Osmel Muller indicated that he has been reminding his brother that they need to take specific medications at certain time. An overall improvement in memory function was evident in comparison to 3 days ago- Monday March 5th. GOAL MET    Goal 2: Will demonstrate functional executive function skills for daily social, vocational, and personal activities Will address at next treatment session. Goal 3: Will independently employ strategies to faciliate attention to the left visual fields for daily activities which includes reading  Provided a \"L\" shaped guide to employ when reading newspaper size print to facilitate attention to the left margin and to read content \"line by line\". Maximal cues needed initially with fewer cues needed as session progressed. Osmel Muller was wearing reading glasses that were purchased at a [employer] and he noted that sometimes the print was too small to read. Goal 4: Will demonstrate intact divided attention for completion of daily activities Not yet addressed    Goal 5: Auditory comprehension of 6-8 sentence passages will improve to 80% or better consistency  Auditory comprehension of 6 sentence passages was accurate with 92% accuracy based upon responses to [facility] and yes/no questions. Effective Strategies that Improve fx: Use of a guide and strategies to attend to the left to facilitate reading and attention to the left for functional activities    Barriers to progress: Visual impairments    Education completed:     Reviewed treatment goals, plan and progress during this session with Osmel Muller and his brother. Home Exercise Plan:     Use of guide to facilitate attention to the left side, use of various types of memory to recall functional everyday information.       Objective Findings:  See above    Interventions used this date:  [] Speech Treatment       [] Instruction in HEP  [] Group   [] Dysphagia Treatment   [x] Cognitive Skill Nolan  [] Motor  [] Voice Treatment       []

## 2018-03-09 ENCOUNTER — HOSPITAL ENCOUNTER (OUTPATIENT)
Dept: PHYSICAL THERAPY | Age: 58
Discharge: HOME | End: 2018-03-09
Admitting: PHYSICAL MEDICINE & REHABILITATION

## 2018-03-09 ENCOUNTER — HOSPITAL ENCOUNTER (OUTPATIENT)
Dept: OCCUPATIONAL THERAPY | Age: 58
Discharge: HOME | End: 2018-03-09
Admitting: PHYSICAL MEDICINE & REHABILITATION

## 2018-03-09 NOTE — FLOWSHEET NOTE
Physical Therapy Daily Treatment Note  Date:  3/9/2018    Patient Name:  Mary Epstein    :  1960  MRN: [id number]  Restrictions/Precautions:  Fall risk  Diagnosis: ischemic stroke w/infarction  Treatment Diagnosis: ataxic gait, imp. function, left body weakness, imp. coordination  Insurance/Certification information:  [place]  30 pcy    G-Coded  Next Physician Visit: Unknown  Referring Physician:  Som EDGAR EXPIRATION DATE:  12 visits  Visit# / total visits:  14/10   Plus                      Initial Pain level:  0/10       G-Code Selection: (On Eval and every 10th visit or Discharge)  Initial evaluation completed 18  9th visit completed 18    MEASURE    [x] Mobility: Walking and Moving Around     [x] Current ()   [x] Goal ()   [] DC ()  [] Changing/Maintaining Body Position     [] Current (6282)      [] Goal ()   [] DC ()  [] Carrying / Moving / Handling Objects     [] Current ()   [] Goal ()   [] DC ()  [] Self-Care     [] Current ()   [] Goal ()   [] DC ()  [] Other PT/OT primary DX     [] Current ()   [] Goal ()   [] DC ()    SEVERITY    CURRENT  GOAL  DISCHARGE   [] CH (0% Impaired, Indep.)  [x] CI (1-19% Impaired, SBA-CGA)  [] CJ (20-39% Impaired, MIN A)  [] CK  (40-59% Impairment, Mod A)  [] CL  (60-79% Impairment, Max A)  [] CM  (80-99% Impairment, Dep.)   [] CN  (100% Impairment, Tot Dep.) [] CH (0% Impaired, Indep.)  [] CI (1-19% Impaired, SBA-CGA)  [x] CJ (20-39% Impaired, MIN A)  [] CK  (40-59% Impairment, Mod A)  [] CL  (60-79% Impairment, Max A)  [] CM  (80-99% Impairment, Dep.)   [] CN  (100% Impairment, Tot Dep.)  [] CH (0% Impaired, Indep.)  [] CI (1-19% Impaired, SBA-CGA)  [] CJ (20-39% Impaired, MIN A)  [] CK  (40-59% Impairment, Mod A)  [] CL  (60-79% Impairment, Max A)  [] CM  (80-99% Impairment, Dep.)   [] CN  (100% Impairment, Tot Dep.)             Subjective: Pt states he is having a it's impact on patient's life/function. Currently, patient is not drinking. There is concern about return to alcohol when no longer with family. Gait   Gait Training:  Cues were given for safety, awareness, path. Gait is ataxic, wide URI. Ambulated 300 feet w/inconsistent foot placement meng. W/directional changes. VC for DF left ankle w/heel strike. Wide stance  Reciprocal pattern  Good arm swing  No AD Wide stance  Reciprocal pattern  Good arm swing  No AD    Raining outside today; unable to assess community mobility. Tinetti performed and scored. Wide stance  Reciprocal pattern  Good arm swing  No AD    Raining outside today; unable to assess community mobility. Always w/a family member when in community,  No > supervision. Wide stance  Reciprocal pattern  Good arm swing  No AD        Nustep arms10 David Young  WL6  20  S9/A9 LV 5 x 16 min  Able to maintain left foot to pedal w/reminders. WL6  20  S9/A9 LV 5 x 16 min  Able to maintain left foot to pedal w/reminders. WL6  25  S9/A9 LV 5 x 16 min  Able to maintain left foot to pedal w/o cues. WL6 x 17 min  Able to keep L foot to pedal did req to move seat fwd to 8 WL7 x 28 min  Seat #8  Able to keep L foot to pedal and left hand on . WL7 x 30 min  Seat #8  Able to keep L foot to pedal and left hand on . WL7 x 23 min  Seat #8  Able to keep L foot to pedal and left hand on . SLR             SL hip ABD             SAQ            LE strengthening  Side stepping  R/L x 20 paces  GTB at knees  Counting for rhythm  40 feet R/L x 4 reps while holding 3kg ball  Supervision and vc for left side attention/observation. Side stepping  R/L x 20 paces  GTB at knees  Counting for rhythm  40 feet R/L x 4 reps    Encouraged wide steps     Supervision and vc for left side attention/observation.    Supine:  Bridges w/soft blue bolster b/t knees  2 x 15    Clamshell #1 in supine w/GTB at knees  2 x 15    Standing at parallel

## 2018-03-12 ENCOUNTER — HOSPITAL ENCOUNTER (OUTPATIENT)
Dept: SPEECH THERAPY | Age: 58
Discharge: HOME | End: 2018-03-12
Admitting: PHYSICAL MEDICINE & REHABILITATION

## 2018-03-12 NOTE — FLOWSHEET NOTE
mariana    []  Patient limited by pain []  Patient limited by other medical complications   []  Other:     Patient Requires Follow-up:  [x]  Yes  []  No    Plan: [x]  Continue per plan of care []  Alter current plan (see comments)   [x]  Plan of care initiated []  Hold pending MD visit []  Discharge    Plan for Next Session:  Continue plan of care    Electronically signed by:    Yobany Carlos MS, CCC-SLP  Speech/Language Pathologist  3/12/2018  4:44 PM

## 2018-03-13 ENCOUNTER — OFFICE VISIT (OUTPATIENT)
Dept: FAMILY MEDICINE CLINIC | Age: 58
End: 2018-03-13

## 2018-03-13 VITALS
WEIGHT: 126.4 LBS | OXYGEN SATURATION: 97 % | DIASTOLIC BLOOD PRESSURE: 86 MMHG | HEART RATE: 86 BPM | SYSTOLIC BLOOD PRESSURE: 140 MMHG | TEMPERATURE: 97.4 F | BODY MASS INDEX: 21.7 KG/M2

## 2018-03-13 DIAGNOSIS — M62.838 MUSCLE SPASM: Primary | ICD-10-CM

## 2018-03-13 DIAGNOSIS — E83.42 HYPOMAGNESEMIA: ICD-10-CM

## 2018-03-13 DIAGNOSIS — I63.311 CEREBROVASCULAR ACCIDENT (CVA) DUE TO THROMBOSIS OF RIGHT MIDDLE CEREBRAL ARTERY (HCC): ICD-10-CM

## 2018-03-13 PROCEDURE — G8427 DOCREV CUR MEDS BY ELIG CLIN: HCPCS | Performed by: FAMILY MEDICINE

## 2018-03-13 PROCEDURE — G8598 ASA/ANTIPLAT THER USED: HCPCS | Performed by: FAMILY MEDICINE

## 2018-03-13 PROCEDURE — G8482 FLU IMMUNIZE ORDER/ADMIN: HCPCS | Performed by: FAMILY MEDICINE

## 2018-03-13 PROCEDURE — 1036F TOBACCO NON-USER: CPT | Performed by: FAMILY MEDICINE

## 2018-03-13 PROCEDURE — G8420 CALC BMI NORM PARAMETERS: HCPCS | Performed by: FAMILY MEDICINE

## 2018-03-13 PROCEDURE — 99213 OFFICE O/P EST LOW 20 MIN: CPT | Performed by: FAMILY MEDICINE

## 2018-03-13 PROCEDURE — 3017F COLORECTAL CA SCREEN DOC REV: CPT | Performed by: FAMILY MEDICINE

## 2018-03-13 ASSESSMENT — PATIENT HEALTH QUESTIONNAIRE - PHQ9
1. LITTLE INTEREST OR PLEASURE IN DOING THINGS: 0
2. FEELING DOWN, DEPRESSED OR HOPELESS: 0
SUM OF ALL RESPONSES TO PHQ QUESTIONS 1-9: 0
SUM OF ALL RESPONSES TO PHQ9 QUESTIONS 1 & 2: 0

## 2018-03-13 ASSESSMENT — ENCOUNTER SYMPTOMS: SHORTNESS OF BREATH: 0

## 2018-03-13 NOTE — PROGRESS NOTES
Take 5 mg by mouth nightly, Disp: , Rfl:     pyridoxine (B-6) 25 MG tablet, Take 25 mg by mouth daily, Disp: , Rfl:     Cyanocobalamin (B-12) 2000 MCG TABS, Take 1 tablet by mouth daily, Disp: , Rfl:     FOLIC ACID PO, Take 2.5 mg by mouth daily, Disp: , Rfl:     Omega-3 Fatty Acids (FISH OIL PO), Take 1 tablet by mouth daily, Disp: , Rfl:     Multiple Vitamins-Minerals (THERAPEUTIC MULTIVITAMIN-MINERALS) tablet, Take 1 tablet by mouth daily, Disp: , Rfl:     atorvastatin (LIPITOR) 40 MG tablet, Take 1 tablet by mouth nightly, Disp: 90 tablet, Rfl: 0    FLUoxetine (PROZAC) 10 MG capsule, Take 1 capsule by mouth daily (Patient taking differently: Take 10 mg by mouth nightly ), Disp: 90 capsule, Rfl: 0    aspirin 81 MG chewable tablet, Take 1 tablet by mouth daily (Patient taking differently: Take 81 mg by mouth nightly ), Disp: 30 tablet, Rfl: 3      Objective:   Physical Exam   Constitutional: He is oriented to person, place, and time. He appears well-developed and well-nourished. No distress. Neurological: He is alert and oriented to person, place, and time. Psychiatric: He has a normal mood and affect. Nursing note and vitals reviewed. Assessment:      1. Muscle spasm     2. Hypomagnesemia     3. Cerebrovascular accident (CVA) due to thrombosis of right middle cerebral artery ([address]             Plan:      1 & 2. We discussed his muscle spasm and that was most likely secondary to some relative hypovolemia and extended exercise during physical therapy. His magnesium level hadn't changed from the time he was discharged in December until his follow-up ER on the seventh. The likelihood that this suddenly caused muscle spasm in his leg and arm I find unlikely. He's going to start some over-the-counter magnesium 250 mg per day I think that is reasonable. He is increasing some foods with magnesium and vitamins well. I recommend we repeat his magnesium level in about 2 months.   If he has any further

## 2018-03-14 ENCOUNTER — HOSPITAL ENCOUNTER (OUTPATIENT)
Dept: OCCUPATIONAL THERAPY | Age: 58
Discharge: HOME | End: 2018-03-14
Admitting: PHYSICAL MEDICINE & REHABILITATION

## 2018-03-15 ENCOUNTER — HOSPITAL ENCOUNTER (OUTPATIENT)
Dept: SPEECH THERAPY | Age: 58
Discharge: HOME | End: 2018-03-15
Admitting: PHYSICAL MEDICINE & REHABILITATION

## 2018-03-19 ENCOUNTER — HOSPITAL ENCOUNTER (OUTPATIENT)
Dept: SPEECH THERAPY | Age: 58
Discharge: HOME | End: 2018-03-19
Admitting: PHYSICAL MEDICINE & REHABILITATION

## 2018-03-19 NOTE — FLOWSHEET NOTE
questions regarding sodium and calorie levels was accurate. He quickly located the information and was able to answer this therapist's questions promptly and accurately. 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 He reported that his brother is superivising the installation and so far he has not made any mistakes. Goal 3: Will independently employ strategies to faciliate attention to the left visual fields for daily activities which includes reading  Provided a \"L\" shaped guide to employ when reading newspaper size print to facilitate attention to the left margin and to read content \"line by line\". Maximal cues needed initially with fewer cues needed as session progressed. Peyman Chavez was wearing reading glasses that were purchased at a [employer] and he noted that sometimes the print was too small to read. Filling medication box- attention to the left side of the box was usually intact. However, during the placing of the pills into the various sections, he \"forgot\" that he was holding the pills in his left hand and spilled them. During reading activity for newspaper content, Peyman Chavez independently positioned the guide so that his attention to reading content was enhanced. Good attention to the left overall for reading tasks. He indicated that his OT and myself have trained him to [facility] to his left side\". During reading of magazine articles, Peyman Chavez demonstrated attention to the left side of the page with no use of the guide when reading slightly larger print for the bylines. He employed his index finger to guide himself for the left margin and the correct line. Accuracy for attention to left margin and correct line of print was 90%. Good attention for the left side during reading of a magazine.  He read the small print with intact visual tracking with 98% consistency and attention to the

## 2018-03-21 ENCOUNTER — HOSPITAL ENCOUNTER (OUTPATIENT)
Dept: PHYSICAL THERAPY | Age: 58
Discharge: HOME | End: 2018-03-21
Admitting: PHYSICAL MEDICINE & REHABILITATION

## 2018-03-21 ENCOUNTER — HOSPITAL ENCOUNTER (OUTPATIENT)
Dept: OCCUPATIONAL THERAPY | Age: 58
Discharge: HOME | End: 2018-03-21
Admitting: PHYSICAL MEDICINE & REHABILITATION

## 2018-03-21 NOTE — FLOWSHEET NOTE
ensure safe and effective activity performance Monitoring to ensure safe and effective activity performance   Response to intervention:   Good endurance No fatigue, pt had OT after PT tx  It exhibiting improved endurance No pain No LOB No pain   Plan for Next Session: Gait/balance  LLE strengthening/coordination/proprioceptio    Review and detail HEP - advance prn Gait/balance  LLE strengthening/coordination/proprioceptio     Gait/high level balance  LLE strengthening/coordination/proprioception    Recert 2x/wk x 5 weeks or 10 more visits. Continued improvement expected.  Cont Pt progress standing ex as tolerated Cont Pt progress standing ex as tolerated cotn with POC and progress as dariana cotn with POC and progress as dariana     Modality/intervention used:  [x] Therapeutic Exercise  [] Modalities:  [] Therapeutic Activity     [] Ultrasound  [] Elec  Stim  [] Gait Training      [] Cervical Traction [] Lumbar Traction  [] Neuromuscular Re-education    [] Cold/hotpack [] Iontophoresis   [] Instruction in HEP      [] Vasopneumatic     [] Manual Therapy               [] Self care home management                    (    ) Dry needling    Post Tx Pain Ratin/10    Plan:(Fequency/duration/# of visits)   2x/wk x 6 weeks or 12 visits    [x] Continue per plan of care [] Alter current plan   [] Plan of care initiated [] Hold pending MD visit [] Discharge         Time In: 258  Time Out: 338  Timed Code Treatment Minutes:  40  Total Treatment Minutes: 40    Electronically signed by:  Stephanie Ying,  3/21/2018, 2:57 PM

## 2018-03-21 NOTE — FLOWSHEET NOTE
[x][facility] [address]      [facility] [address]       [facility] [address], [address], [address]       [address]     [phone] [phone] [phone]  ________________________________________________________________________  Occupational Therapy Daily Treatment Note    Date:  3/21/2018  Patient Name:  Yoli Zepeda    :  1960  Restrictions/Precautions:  Left visual field cut  Diagnosis:   CVA  Treatment Diagnosis:  lack of coordination  Insurance/Certification information:  [place] but in process of changing  Referring Physician:   Dr. Jose Connors of care signed (Y/N):    Visit# / total visits:  Pain level: 0/10     Subjective:   Prior Level of Function:  independent  Patient Goals: get back to living independently at own home    Treatment Flowsheet   Right Left              5#  181  5# ; push/pull and ladder  141  5# x x             Reaching to left task with visual scanning and L fine  Motor activity  x        Sorting playing cards  x                                                                    Interventions/Modalities used:  [x] Therapeutic Exercise   [] Modalities:  [x] Therapeutic Activity    [] Ultrasound [] Elec Stimulation   [] Total Motion Release    [] Fluido [] Kinesiotaping  [x] Neuromuscular Re-education   [] Ionto [] Coldpack/hotpack   [] Instruction in HEP    Other:    Objective Findings:   Pt.  Finding his way around clinic better lately    Communication with other providers:    Education provided to patient:    Adverse Reactions to treatment:    Timed Code Treatment Minutes: 45  Total Treatment Minutes:  45    Treatment/Activity Tolerance:     [x]  Patient tolerated treatment well []  Patient limited by fatique    []  Patient limited by pain []  Patient limited by other medical complications   []  Other:     Goals:    Long term goal 1: Pt. will

## 2018-03-22 ENCOUNTER — HOSPITAL ENCOUNTER (OUTPATIENT)
Dept: SPEECH THERAPY | Age: 58
Discharge: HOME | End: 2018-03-22
Admitting: PHYSICAL MEDICINE & REHABILITATION

## 2018-03-23 ENCOUNTER — HOSPITAL ENCOUNTER (OUTPATIENT)
Dept: OCCUPATIONAL THERAPY | Age: 58
Discharge: HOME | End: 2018-03-23
Admitting: PHYSICAL MEDICINE & REHABILITATION

## 2018-03-23 NOTE — FLOWSHEET NOTE
Goals:    Long term goal 1: Pt. will increase L  10# to open a jar. Long term goal 2: Pt. will increase L UE strength to 5/5 to perform activities of choice. Long term goal 3: Pt. will increase L hand coordination as evidenced by 9 hole peg test.  Long term goal 4: Pt. will perform homemaking tasks independently and safely to be able to return to own home independently.      G-Code Selection: (On Eval and every 10th visit or Discharge)    MEASURE    [] Mobility: Walking and Moving Around     [] Current ()   [] Goal ()   [] DC ()  [] Changing/Maintaining Body Position     [] Current (0146)      [] Goal ()   [] DC ()  [x] Carrying / Moving / Handling Objects     [] Current ()   [] Goal ()   [] DC ()  [] Self-Care     [] Current ()   [] Goal ()   [] DC ()  [] Other PT/OT primary DX     [] Current ()   [] Goal ()   [] DC ()    SEVERITY    10th visit  GOAL  DISCHARGE   [] CH (0% Impaired, Indep.)  [] CI (1-19% Impaired, SBA-CGA)  [x] CJ (20-39% Impaired, MIN A)  [] CK  (40-59% Impairment, Mod A)  [] CL  (60-79% Impairment, Max A)  [] CM  (80-99% Impairment, Dep.)   [] CN  (100% Impairment, Tot Dep.) [] CH (0% Impaired, Indep.)  [x] CI (1-19% Impaired, SBA-CGA)  [] CJ (20-39% Impaired, MIN A)  [] CK  (40-59% Impairment, Mod A)  [] CL  (60-79% Impairment, Max A)  [] CM  (80-99% Impairment, Dep.)   [] CN  (100% Impairment, Tot Dep.)  [] CH (0% Impaired, Indep.)  [] CI (1-19% Impaired, SBA-CGA)  [] CJ (20-39% Impaired, MIN A)  [] CK  (40-59% Impairment, Mod A)  [] CL  (60-79% Impairment, Max A)  [] CM  (80-99% Impairment, Dep.)   [] CN  (100% Impairment, Tot Dep.)          Patient Requires Follow-up:  [x]  Yes  []  No    Plan: [x]  Continue per plan of care []  Alter current plan (see comments)   []  Plan of care initiated []  Hold pending MD visit []  Discharge    Plan for Next Session:      Electronically signed by:  ANABELLE Harris/DELMY, 3/23/2018, 5:01 PM

## 2018-03-28 ENCOUNTER — HOSPITAL ENCOUNTER (OUTPATIENT)
Dept: OCCUPATIONAL THERAPY | Age: 58
Discharge: HOME | End: 2018-03-28
Admitting: PHYSICAL MEDICINE & REHABILITATION

## 2018-03-29 ENCOUNTER — HOSPITAL ENCOUNTER (OUTPATIENT)
Dept: SPEECH THERAPY | Age: 58
Discharge: HOME | End: 2018-03-29
Admitting: PHYSICAL MEDICINE & REHABILITATION

## 2018-03-29 NOTE — FLOWSHEET NOTE
Alert, cooperative   Pain level: 0  0 0 0 0 0   Visit# / total visits:  / 1/1 2/2 3/3 4/4 5/5 6/6   GOALS:         Goal 1: Will demonstrate intact memory function for daily activities which includes short term, long term, prospective and working memory skills Intact long term, working memory, short term memory and prospective memory based upon performance in today's session. Tyrone Hester indicated that he has been reminding his brother that they need to take specific medications at certain time. An overall improvement in memory function was evident in comparison to 3 days ago- Monday March 5th. GOAL MET Goal met. Without cuing, at the start of the session, Tyrone Hester stated that he had a Dr's appointment tomorrow at 10:45AM. This appointment was confirmed with his brother. This is confirmation of intact long term memory skills Goal met Goal met Goal met Goal met   Goal 2: Will demonstrate functional executive function skills for daily social, vocational, and personal activities Will address at next treatment session. Reading medication bottles and filling medication box activity was completed by Tyrone Hester. He employed his reading glasses during this activity and he indicated that he had difficulty reading the print at times on the pill bottles. With moderate to maximum cues, he completed this activity of filling the medication box accurately. Errors included the following: reduced attention to evening vs bedtime, after dinner vs bedtime and the number of pills to be placed in each slot. Tyrone Hester is aware that he is not able to drive at this time due to visual spatial and executive function impairments. Reading of menus and frozen dinner packages was targeted as the functional activities for this session. He located the menu items at [facility] and [facility] that were specific $ amounts with 90% consistency. He demonstrated attention to the left side when locating target information requested by this clinician.  When the print became very complications   []  Other:     Patient Requires Follow-up:  [x]  Yes  []  No    Plan: [x]  Continue per plan of care []  Alter current plan (see comments)   [x]  Plan of care initiated []  Hold pending MD visit []  Discharge    Plan for Next Session:  Continue plan of care    Electronically signed by:    Isabela Ball.  MS Breanna, CCC-SLP  Speech/Language Pathologist  3/29/2018  3:27 PM

## 2018-03-30 ENCOUNTER — HOSPITAL ENCOUNTER (OUTPATIENT)
Dept: OCCUPATIONAL THERAPY | Age: 58
Discharge: HOME | End: 2018-03-30
Admitting: PHYSICAL MEDICINE & REHABILITATION

## 2018-03-30 NOTE — FLOWSHEET NOTE
limited by other medical complications   []  Other:     Goals:    Long term goal 1: Pt. will increase L  10# to open a jar. Long term goal 2: Pt. will increase L UE strength to 5/5 to perform activities of choice. Long term goal 3: Pt. will increase L hand coordination as evidenced by 9 hole peg test.  Long term goal 4: Pt. will perform homemaking tasks independently and safely to be able to return to own home independently.      G-Code Selection: (On Eval and every 10th visit or Discharge)    MEASURE    [] Mobility: Walking and Moving Around     [] Current ()   [] Goal ()   [] DC ()  [] Changing/Maintaining Body Position     [] Current (1308)      [] Goal ()   [] DC ()  [x] Carrying / Moving / Handling Objects     [] Current ()   [] Goal ()   [] DC ()  [] Self-Care     [] Current ()   [] Goal ()   [] DC ()  [] Other PT/OT primary DX     [] Current ()   [] Goal ()   [] DC ()    SEVERITY    10th visit  GOAL  DISCHARGE   [] CH (0% Impaired, Indep.)  [] CI (1-19% Impaired, SBA-CGA)  [x] CJ (20-39% Impaired, MIN A)  [] CK  (40-59% Impairment, Mod A)  [] CL  (60-79% Impairment, Max A)  [] CM  (80-99% Impairment, Dep.)   [] CN  (100% Impairment, Tot Dep.) [] CH (0% Impaired, Indep.)  [x] CI (1-19% Impaired, SBA-CGA)  [] CJ (20-39% Impaired, MIN A)  [] CK  (40-59% Impairment, Mod A)  [] CL  (60-79% Impairment, Max A)  [] CM  (80-99% Impairment, Dep.)   [] CN  (100% Impairment, Tot Dep.)  [] CH (0% Impaired, Indep.)  [] CI (1-19% Impaired, SBA-CGA)  [] CJ (20-39% Impaired, MIN A)  [] CK  (40-59% Impairment, Mod A)  [] CL  (60-79% Impairment, Max A)  [] CM  (80-99% Impairment, Dep.)   [] CN  (100% Impairment, Tot Dep.)          Patient Requires Follow-up:  [x]  Yes  []  No    Plan: [x]  Continue per plan of care []  Alter current plan (see comments)   []  Plan of care initiated []  Hold pending MD visit []  Discharge    Plan for Next Session:

## 2018-04-01 ENCOUNTER — HOSPITAL ENCOUNTER (OUTPATIENT)
Dept: OTHER | Age: 58
Discharge: HOME | End: 2018-04-30
Attending: PHYSICAL MEDICINE & REHABILITATION | Admitting: PHYSICAL MEDICINE & REHABILITATION

## 2018-04-04 ENCOUNTER — HOSPITAL ENCOUNTER (OUTPATIENT)
Dept: OCCUPATIONAL THERAPY | Age: 58
Discharge: HOME | End: 2018-04-04
Admitting: PHYSICAL MEDICINE & REHABILITATION

## 2018-04-05 ENCOUNTER — HOSPITAL ENCOUNTER (OUTPATIENT)
Dept: SPEECH THERAPY | Age: 58
Discharge: HOME | End: 2018-04-05
Admitting: PHYSICAL MEDICINE & REHABILITATION

## 2018-04-06 ENCOUNTER — HOSPITAL ENCOUNTER (OUTPATIENT)
Dept: PHYSICAL THERAPY | Age: 58
Discharge: HOME | End: 2018-04-06
Admitting: PHYSICAL MEDICINE & REHABILITATION

## 2018-04-06 ENCOUNTER — HOSPITAL ENCOUNTER (OUTPATIENT)
Dept: OCCUPATIONAL THERAPY | Age: 58
Discharge: HOME | End: 2018-04-06
Admitting: PHYSICAL MEDICINE & REHABILITATION

## 2018-04-11 ENCOUNTER — HOSPITAL ENCOUNTER (OUTPATIENT)
Dept: OCCUPATIONAL THERAPY | Age: 58
Discharge: HOME | End: 2018-04-11
Admitting: PHYSICAL MEDICINE & REHABILITATION

## 2018-04-13 ENCOUNTER — HOSPITAL ENCOUNTER (OUTPATIENT)
Dept: OCCUPATIONAL THERAPY | Age: 58
Discharge: HOME | End: 2018-04-13
Admitting: PHYSICAL MEDICINE & REHABILITATION

## 2018-04-19 ENCOUNTER — HOSPITAL ENCOUNTER (OUTPATIENT)
Dept: SPEECH THERAPY | Age: 58
Discharge: HOME | End: 2018-04-19
Admitting: PHYSICAL MEDICINE & REHABILITATION

## 2018-04-20 ENCOUNTER — HOSPITAL ENCOUNTER (OUTPATIENT)
Dept: OCCUPATIONAL THERAPY | Age: 58
Discharge: HOME | End: 2018-04-20
Admitting: PHYSICAL MEDICINE & REHABILITATION

## 2018-05-01 ENCOUNTER — HOSPITAL ENCOUNTER (OUTPATIENT)
Dept: OTHER | Age: 58
Discharge: HOME | End: 2018-05-07
Attending: PHYSICAL MEDICINE & REHABILITATION | Admitting: PHYSICAL MEDICINE & REHABILITATION

## 2018-05-01 ENCOUNTER — HOSPITAL ENCOUNTER (OUTPATIENT)
Dept: OTHER | Age: 58
Discharge: HOME | End: 2018-05-31
Attending: PHYSICAL MEDICINE & REHABILITATION | Admitting: PHYSICAL MEDICINE & REHABILITATION

## 2018-05-07 ENCOUNTER — OFFICE VISIT (OUTPATIENT)
Dept: FAMILY MEDICINE CLINIC | Age: 58
End: 2018-05-07

## 2018-05-07 VITALS
DIASTOLIC BLOOD PRESSURE: 84 MMHG | WEIGHT: 125.6 LBS | HEART RATE: 72 BPM | SYSTOLIC BLOOD PRESSURE: 144 MMHG | TEMPERATURE: 97.6 F | BODY MASS INDEX: 21.56 KG/M2 | OXYGEN SATURATION: 98 %

## 2018-05-07 DIAGNOSIS — E83.42 HYPOMAGNESEMIA: Primary | ICD-10-CM

## 2018-05-07 DIAGNOSIS — F10.20 ALCOHOLISM (HCC): ICD-10-CM

## 2018-05-07 DIAGNOSIS — Z87.891 FORMER SMOKER: ICD-10-CM

## 2018-05-07 LAB — MAGNESIUM: 2.2 MG/DL (ref 1.8–2.4)

## 2018-05-07 PROCEDURE — G8598 ASA/ANTIPLAT THER USED: HCPCS | Performed by: FAMILY MEDICINE

## 2018-05-07 PROCEDURE — 3017F COLORECTAL CA SCREEN DOC REV: CPT | Performed by: FAMILY MEDICINE

## 2018-05-07 PROCEDURE — 36415 COLL VENOUS BLD VENIPUNCTURE: CPT | Performed by: FAMILY MEDICINE

## 2018-05-07 PROCEDURE — G8420 CALC BMI NORM PARAMETERS: HCPCS | Performed by: FAMILY MEDICINE

## 2018-05-07 PROCEDURE — G8427 DOCREV CUR MEDS BY ELIG CLIN: HCPCS | Performed by: FAMILY MEDICINE

## 2018-05-07 PROCEDURE — 1036F TOBACCO NON-USER: CPT | Performed by: FAMILY MEDICINE

## 2018-05-07 PROCEDURE — 99214 OFFICE O/P EST MOD 30 MIN: CPT | Performed by: FAMILY MEDICINE

## 2018-05-07 RX ORDER — LEVETIRACETAM 750 MG/1
1500 TABLET ORAL 2 TIMES DAILY
COMMUNITY
End: 2020-10-17 | Stop reason: SDUPTHER

## 2018-05-07 RX ORDER — ATORVASTATIN CALCIUM 40 MG/1
40 TABLET, FILM COATED ORAL NIGHTLY
Qty: 90 TABLET | Refills: 0 | Status: SHIPPED | OUTPATIENT
Start: 2018-05-07 | End: 2018-09-08 | Stop reason: SDUPTHER

## 2018-05-07 RX ORDER — FLUOXETINE 10 MG/1
10 CAPSULE ORAL DAILY
Qty: 90 CAPSULE | Refills: 0 | Status: SHIPPED | OUTPATIENT
Start: 2018-05-07 | End: 2018-11-19 | Stop reason: SDUPTHER

## 2018-05-07 ASSESSMENT — ENCOUNTER SYMPTOMS
SHORTNESS OF BREATH: 0
COUGH: 0

## 2018-05-22 ENCOUNTER — TELEPHONE (OUTPATIENT)
Dept: FAMILY MEDICINE CLINIC | Age: 58
End: 2018-05-22

## 2018-06-22 ENCOUNTER — TELEPHONE (OUTPATIENT)
Dept: FAMILY MEDICINE CLINIC | Age: 58
End: 2018-06-22

## 2018-06-25 ENCOUNTER — TELEPHONE (OUTPATIENT)
Dept: FAMILY MEDICINE CLINIC | Age: 58
End: 2018-06-25

## 2018-07-24 ENCOUNTER — TELEPHONE (OUTPATIENT)
Dept: FAMILY MEDICINE CLINIC | Age: 58
End: 2018-07-24

## 2018-07-24 PROBLEM — H53.9 VISION CHANGES: Status: ACTIVE | Noted: 2018-07-24

## 2018-08-03 ENCOUNTER — OFFICE VISIT (OUTPATIENT)
Dept: FAMILY MEDICINE CLINIC | Age: 58
End: 2018-08-03

## 2018-08-03 VITALS
HEART RATE: 97 BPM | DIASTOLIC BLOOD PRESSURE: 62 MMHG | OXYGEN SATURATION: 97 % | BODY MASS INDEX: 20.98 KG/M2 | WEIGHT: 122.2 LBS | TEMPERATURE: 96.1 F | SYSTOLIC BLOOD PRESSURE: 132 MMHG

## 2018-08-03 DIAGNOSIS — I63.311 CEREBROVASCULAR ACCIDENT (CVA) DUE TO THROMBOSIS OF RIGHT MIDDLE CEREBRAL ARTERY (HCC): ICD-10-CM

## 2018-08-03 DIAGNOSIS — H53.9 VISION CHANGES: Primary | ICD-10-CM

## 2018-08-03 DIAGNOSIS — Z87.891 FORMER SMOKER: ICD-10-CM

## 2018-08-03 DIAGNOSIS — F10.20 ALCOHOLISM (HCC): ICD-10-CM

## 2018-08-03 PROBLEM — M54.9 CVA TENDERNESS: Status: RESOLVED | Noted: 2017-12-29 | Resolved: 2018-08-03

## 2018-08-03 PROCEDURE — G8420 CALC BMI NORM PARAMETERS: HCPCS | Performed by: FAMILY MEDICINE

## 2018-08-03 PROCEDURE — 1111F DSCHRG MED/CURRENT MED MERGE: CPT | Performed by: FAMILY MEDICINE

## 2018-08-03 PROCEDURE — 1036F TOBACCO NON-USER: CPT | Performed by: FAMILY MEDICINE

## 2018-08-03 PROCEDURE — G8427 DOCREV CUR MEDS BY ELIG CLIN: HCPCS | Performed by: FAMILY MEDICINE

## 2018-08-03 PROCEDURE — 3017F COLORECTAL CA SCREEN DOC REV: CPT | Performed by: FAMILY MEDICINE

## 2018-08-03 PROCEDURE — 99214 OFFICE O/P EST MOD 30 MIN: CPT | Performed by: FAMILY MEDICINE

## 2018-08-03 PROCEDURE — G8598 ASA/ANTIPLAT THER USED: HCPCS | Performed by: FAMILY MEDICINE

## 2018-08-03 ASSESSMENT — ENCOUNTER SYMPTOMS: SHORTNESS OF BREATH: 0

## 2018-09-14 ENCOUNTER — TELEPHONE (OUTPATIENT)
Dept: FAMILY MEDICINE CLINIC | Age: 58
End: 2018-09-14

## 2018-10-01 ENCOUNTER — HOSPITAL ENCOUNTER (EMERGENCY)
Age: 58
Discharge: HOME | End: 2018-10-01
Attending: EMERGENCY MEDICINE
Payer: MEDICARE

## 2018-10-01 VITALS
RESPIRATION RATE: 14 BRPM | HEART RATE: 85 BPM | OXYGEN SATURATION: 100 % | DIASTOLIC BLOOD PRESSURE: 84 MMHG | TEMPERATURE: 98.5 F | SYSTOLIC BLOOD PRESSURE: 131 MMHG

## 2018-10-01 DIAGNOSIS — R56.9 SEIZURE (HCC): Primary | ICD-10-CM

## 2018-10-01 LAB
ALBUMIN SERPL-MCNC: 4.1 GM/DL (ref 3.4–5)
ALCOHOL SCREEN SERUM: <0.01 %WT/VOL
ALP BLD-CCNC: 150 IU/L (ref 40–129)
ALT SERPL-CCNC: 26 U/L (ref 10–40)
ANION GAP SERPL CALCULATED.3IONS-SCNC: 17 MMOL/L (ref 4–16)
AST SERPL-CCNC: 27 IU/L (ref 15–37)
BASOPHILS ABSOLUTE: 0.1 K/CU MM
BASOPHILS RELATIVE PERCENT: 0.9 % (ref 0–1)
BILIRUB SERPL-MCNC: 0.4 MG/DL (ref 0–1)
BUN BLDV-MCNC: 11 MG/DL (ref 6–23)
CALCIUM SERPL-MCNC: 8.7 MG/DL (ref 8.3–10.6)
CHLORIDE BLD-SCNC: 95 MMOL/L (ref 99–110)
CO2: 22 MMOL/L (ref 21–32)
CREAT SERPL-MCNC: 0.8 MG/DL (ref 0.9–1.3)
DIFFERENTIAL TYPE: ABNORMAL
EOSINOPHILS ABSOLUTE: 0.1 K/CU MM
EOSINOPHILS RELATIVE PERCENT: 1.2 % (ref 0–3)
GFR AFRICAN AMERICAN: >60 ML/MIN/1.73M2
GFR NON-AFRICAN AMERICAN: >60 ML/MIN/1.73M2
GLUCOSE BLD-MCNC: 83 MG/DL (ref 70–99)
HCT VFR BLD CALC: 37.6 % (ref 42–52)
HEMOGLOBIN: 12.1 GM/DL (ref 13.5–18)
IMMATURE NEUTROPHIL %: 0.3 % (ref 0–0.43)
LYMPHOCYTES ABSOLUTE: 1.5 K/CU MM
LYMPHOCYTES RELATIVE PERCENT: 14.6 % (ref 24–44)
MAGNESIUM: 2 MG/DL (ref 1.8–2.4)
MCH RBC QN AUTO: 32.4 PG (ref 27–31)
MCHC RBC AUTO-ENTMCNC: 32.2 % (ref 32–36)
MCV RBC AUTO: 100.8 FL (ref 78–100)
MONOCYTES ABSOLUTE: 0.9 K/CU MM
MONOCYTES RELATIVE PERCENT: 8.4 % (ref 0–4)
NUCLEATED RBC %: 0 %
PDW BLD-RTO: 14.6 % (ref 11.7–14.9)
PHOSPHORUS: 3.1 MG/DL (ref 2.5–4.9)
PLATELET # BLD: 351 K/CU MM (ref 140–440)
PMV BLD AUTO: 8.5 FL (ref 7.5–11.1)
POTASSIUM SERPL-SCNC: 4.2 MMOL/L (ref 3.5–5.1)
RBC # BLD: 3.73 M/CU MM (ref 4.6–6.2)
SEGMENTED NEUTROPHILS ABSOLUTE COUNT: 7.6 K/CU MM
SEGMENTED NEUTROPHILS RELATIVE PERCENT: 74.6 % (ref 36–66)
SODIUM BLD-SCNC: 134 MMOL/L (ref 135–145)
TOTAL IMMATURE NEUTOROPHIL: 0.03 K/CU MM
TOTAL NUCLEATED RBC: 0 K/CU MM
TOTAL PROTEIN: 7.2 GM/DL (ref 6.4–8.2)
TSH HIGH SENSITIVITY: 1 UIU/ML (ref 0.27–4.2)
WBC # BLD: 10.1 K/CU MM (ref 4–10.5)

## 2018-10-01 PROCEDURE — 84100 ASSAY OF PHOSPHORUS: CPT

## 2018-10-01 PROCEDURE — G0480 DRUG TEST DEF 1-7 CLASSES: HCPCS

## 2018-10-01 PROCEDURE — 80177 DRUG SCRN QUAN LEVETIRACETAM: CPT

## 2018-10-01 PROCEDURE — 80053 COMPREHEN METABOLIC PANEL: CPT

## 2018-10-01 PROCEDURE — 99284 EMERGENCY DEPT VISIT MOD MDM: CPT

## 2018-10-01 PROCEDURE — 80050 GENERAL HEALTH PANEL: CPT

## 2018-10-01 PROCEDURE — 36415 COLL VENOUS BLD VENIPUNCTURE: CPT

## 2018-10-01 PROCEDURE — 85025 COMPLETE CBC W/AUTO DIFF WBC: CPT

## 2018-10-01 PROCEDURE — 84443 ASSAY THYROID STIM HORMONE: CPT

## 2018-10-01 PROCEDURE — 83735 ASSAY OF MAGNESIUM: CPT

## 2018-10-01 NOTE — ED TRIAGE NOTES
Pt presents to ED for seizures. Glenda states they found Pt laying on floor having a full body seizure. Unknown on duration. Pt has Hx. Of seizures. Pt denies of any pain. - injury to mouth or tongue. Glenda gave 5mg  IN then 2.5 mg IV of versed. Seizure precautions placed.

## 2018-10-01 NOTE — ED NOTES
Bed: ED-27  Expected date:   Expected time:   Means of arrival:   Comments:  Medic, seizure     Aby Herrera RN  10/01/18 112

## 2018-10-01 NOTE — ED PROVIDER NOTES
No current facility-administered medications for this encounter. Current Outpatient Prescriptions   Medication Sig Dispense Refill    atorvastatin (LIPITOR) 40 MG tablet TAKE ONE TABLET BY MOUTH ONCE NIGHTLY 90 tablet 1    clopidogrel (PLAVIX) 75 MG tablet Take 1 tablet by mouth daily 30 tablet 3    Vitamin E 200 units TABS Take 200 Units by mouth daily 07/24/18 Takes OTC      levETIRAcetam (KEPPRA) 500 MG tablet Take 500 mg by mouth 2 times daily       FLUoxetine (PROZAC) 10 MG capsule Take 1 capsule by mouth daily 90 capsule 0    donepezil (ARICEPT) 5 MG tablet Take 5 mg by mouth nightly      pyridoxine (B-6) 25 MG tablet Take 25 mg by mouth daily 07/24/18 Takes OTC      Cyanocobalamin (B-12) 2000 MCG TABS Take 1 tablet by mouth daily 07/24/18 Takes OTC      FOLIC ACID PO Take 2.5 mg by mouth daily 07/24/18 Takes OTC      Omega-3 Fatty Acids (FISH OIL PO) Take 1 tablet by mouth daily      Multiple Vitamins-Minerals (THERAPEUTIC MULTIVITAMIN-MINERALS) tablet Take 1 tablet by mouth daily 07/24/18 Takes OTC      aspirin 81 MG chewable tablet Take 1 tablet by mouth daily (Patient taking differently: Take 81 mg by mouth nightly 07/24/18 Takes OTC) 30 tablet 3     Allergies   Allergen Reactions    Shellfish-Derived Products        Nursing Notes Reviewed    Physical Exam:  ED Triage Vitals   Enc Vitals Group      BP       Pulse       Resp       Temp       Temp src       SpO2       Weight       Height       Head Circumference       Peak Flow       Pain Score       Pain Loc       Pain Edu? Excl. in [address]? My pulse ox interpretation is - normal    General appearance:  No acute distress. Skin:  Warm. Dry. No rash. Eye:  Extraocular movements intact. Ears, nose, mouth and throat:  Oral mucosa moist   Neck:  Trachea midline. Extremity:  No swelling. Normal ROM     Heart:  Regular rate and rhythm, normal S1 & S2, no extra heart sounds.     Perfusion:  intact   Respiratory:  Lungs 12.1 (L) 13.5 - 18.0 GM/DL    Hematocrit 37.6 (L) 42 - 52 %    .8 (H) 78 - 100 FL    MCH 32.4 (H) 27 - 31 PG    MCHC 32.2 32.0 - 36.0 %    RDW 14.6 11.7 - 14.9 %    Platelets 189 586 - 772 K/CU MM    MPV 8.5 7.5 - 11.1 FL    Differential Type AUTOMATED DIFFERENTIAL     Segs Relative 74.6 (H) 36 - 66 %    Lymphocytes % 14.6 (L) 24 - 44 %    Monocytes % 8.4 (H) 0 - 4 %    Eosinophils % 1.2 0 - 3 %    Basophils % 0.9 0 - 1 %    Segs Absolute 7.6 K/CU MM    Lymphocytes # 1.5 K/CU MM    Monocytes # 0.9 K/CU MM    Eosinophils # 0.1 K/CU MM    Basophils # 0.1 K/CU MM    Nucleated RBC % 0.0 %    Total Nucleated RBC 0.0 K/CU MM    Total Immature Neutrophil 0.03 K/CU MM    Immature Neutrophil % 0.3 0 - 0.43 %   Phosphorus   Result Value Ref Range    Phosphorus 3.1 2.5 - 4.9 MG/DL   Magnesium   Result Value Ref Range    Magnesium 2.0 1.8 - 2.4 mg/dl   TSH without Reflex   Result Value Ref Range    TSH, High Sensitivity 0.998 0.270 - 4.20 uIu/ml   Ethanol Level   Result Value Ref Range    Alcohol Scrn <0.01 <0.01 %WT/VOL      Radiographs (if obtained):  [] The following radiograph was interpreted by myself in the absence of a radiologist:   [] Radiologist's Report Reviewed:  No orders to display         EKG (if obtained): (All EKG's are interpreted by myself in the absence of a cardiologist)    Chart review shows recent radiographs:  No results found. MDM:  Patient here for seizure, he's had them in the past, states his complaining medications, labs sent, results nonemergent, he is back to baseline, states he feels great, Keppra level pending, he was admitted recently with a neurologic workup but do not believe that requires repeating, going to refer him back to his neurologist, he has not followed up with them since his recent inpatient admission, he understands and agrees with the plan, neurologic exam intact, return warnings given. Clinical Impression:  1.  Seizure [facility])      Disposition referral (if applicable):  Melissa Rosa MD  [address] [place] [address] [phone]    In 2 days      Disposition medications (if applicable):  New Prescriptions    No medications on file       Comment: Please note this report has been produced using speech recognition software and may contain errors related to that system including errors in grammar, punctuation, and spelling, as well as words and phrases that may be inappropriate. If there are any questions or concerns please feel free to contact the dictating provider for clarification.         Kel Blue MD  10/01/18 8363

## 2018-10-03 LAB — KEPPRA: 18

## 2018-10-16 ENCOUNTER — APPOINTMENT (OUTPATIENT)
Dept: CT IMAGING | Age: 58
End: 2018-10-16
Payer: MEDICARE

## 2018-10-16 ENCOUNTER — APPOINTMENT (OUTPATIENT)
Dept: GENERAL RADIOLOGY | Age: 58
End: 2018-10-16
Payer: MEDICARE

## 2018-10-16 ENCOUNTER — HOSPITAL ENCOUNTER (OUTPATIENT)
Age: 58
Setting detail: OBSERVATION
Discharge: HOME | End: 2018-10-17
Attending: EMERGENCY MEDICINE | Admitting: EMERGENCY MEDICINE
Payer: MEDICARE

## 2018-10-16 DIAGNOSIS — D72.829 LEUKOCYTOSIS, UNSPECIFIED TYPE: ICD-10-CM

## 2018-10-16 DIAGNOSIS — R56.9 SEIZURE (HCC): Primary | ICD-10-CM

## 2018-10-16 DIAGNOSIS — Z86.73 HISTORY OF STROKE: ICD-10-CM

## 2018-10-16 PROBLEM — G40.919 BREAKTHROUGH SEIZURE (HCC): Status: ACTIVE | Noted: 2018-10-16

## 2018-10-16 LAB
ALBUMIN SERPL-MCNC: 4.1 GM/DL (ref 3.4–5)
ALP BLD-CCNC: 159 IU/L (ref 40–129)
ALT SERPL-CCNC: 21 U/L (ref 10–40)
ANION GAP SERPL CALCULATED.3IONS-SCNC: 17 MMOL/L (ref 4–16)
APTT: 27.9 SECONDS (ref 21.2–33)
AST SERPL-CCNC: 24 IU/L (ref 15–37)
BASOPHILS ABSOLUTE: 0.1 K/CU MM
BASOPHILS RELATIVE PERCENT: 0.8 % (ref 0–1)
BILIRUB SERPL-MCNC: 0.4 MG/DL (ref 0–1)
BUN BLDV-MCNC: 17 MG/DL (ref 6–23)
CALCIUM SERPL-MCNC: 8.8 MG/DL (ref 8.3–10.6)
CHLORIDE BLD-SCNC: 95 MMOL/L (ref 99–110)
CO2: 23 MMOL/L (ref 21–32)
CREAT SERPL-MCNC: 1 MG/DL (ref 0.9–1.3)
DIFFERENTIAL TYPE: ABNORMAL
EOSINOPHILS ABSOLUTE: 0 K/CU MM
EOSINOPHILS RELATIVE PERCENT: 0.2 % (ref 0–3)
GFR AFRICAN AMERICAN: >60 ML/MIN/1.73M2
GFR NON-AFRICAN AMERICAN: >60 ML/MIN/1.73M2
GLUCOSE BLD-MCNC: 104 MG/DL (ref 70–99)
GLUCOSE BLD-MCNC: 125 MG/DL (ref 70–99)
HCT VFR BLD CALC: 36.4 % (ref 42–52)
HEMOGLOBIN: 12.1 GM/DL (ref 13.5–18)
IMMATURE NEUTROPHIL %: 0.6 % (ref 0–0.43)
INR BLD: 1 INDEX
LACTATE: 0.7 MMOL/L (ref 0.4–2)
LACTATE: 0.8 MMOL/L (ref 0.4–2)
LACTATE: 3.8 MMOL/L (ref 0.4–2)
LACTATE: 4.2 MMOL/L (ref 0.4–2)
LYMPHOCYTES ABSOLUTE: 1.6 K/CU MM
LYMPHOCYTES RELATIVE PERCENT: 12.6 % (ref 24–44)
MAGNESIUM: 2.1 MG/DL (ref 1.8–2.4)
MAGNESIUM: 2.1 MG/DL (ref 1.8–2.4)
MCH RBC QN AUTO: 32.5 PG (ref 27–31)
MCHC RBC AUTO-ENTMCNC: 33.2 % (ref 32–36)
MCV RBC AUTO: 97.8 FL (ref 78–100)
MONOCYTES ABSOLUTE: 0.9 K/CU MM
MONOCYTES RELATIVE PERCENT: 6.8 % (ref 0–4)
NUCLEATED RBC %: 0 %
PDW BLD-RTO: 14.3 % (ref 11.7–14.9)
PHOSPHORUS: 2.8 MG/DL (ref 2.5–4.9)
PLATELET # BLD: 318 K/CU MM (ref 140–440)
PMV BLD AUTO: 9 FL (ref 7.5–11.1)
POTASSIUM SERPL-SCNC: 4.1 MMOL/L (ref 3.5–5.1)
PROTHROMBIN TIME: 11.6 SECONDS (ref 9.12–12.5)
RBC # BLD: 3.72 M/CU MM (ref 4.6–6.2)
SEGMENTED NEUTROPHILS ABSOLUTE COUNT: 10 K/CU MM
SEGMENTED NEUTROPHILS RELATIVE PERCENT: 79 % (ref 36–66)
SODIUM BLD-SCNC: 135 MMOL/L (ref 135–145)
TOTAL CK: 124 IU/L (ref 38–174)
TOTAL IMMATURE NEUTOROPHIL: 0.07 K/CU MM
TOTAL NUCLEATED RBC: 0 K/CU MM
TOTAL PROTEIN: 7.3 GM/DL (ref 6.4–8.2)
TSH HIGH SENSITIVITY: 1.65 UIU/ML (ref 0.27–4.2)
WBC # BLD: 12.6 K/CU MM (ref 4–10.5)

## 2018-10-16 PROCEDURE — 96361 HYDRATE IV INFUSION ADD-ON: CPT

## 2018-10-16 PROCEDURE — 83605 ASSAY OF LACTIC ACID: CPT

## 2018-10-16 PROCEDURE — G0378 HOSPITAL OBSERVATION PER HR: HCPCS

## 2018-10-16 PROCEDURE — 99285 EMERGENCY DEPT VISIT HI MDM: CPT

## 2018-10-16 PROCEDURE — 6370000000 HC RX 637 (ALT 250 FOR IP): Performed by: INTERNAL MEDICINE

## 2018-10-16 PROCEDURE — 96375 TX/PRO/DX INJ NEW DRUG ADDON: CPT

## 2018-10-16 PROCEDURE — 83735 ASSAY OF MAGNESIUM: CPT

## 2018-10-16 PROCEDURE — 6360000002 HC RX W HCPCS: Performed by: INTERNAL MEDICINE

## 2018-10-16 PROCEDURE — 85025 COMPLETE CBC W/AUTO DIFF WBC: CPT

## 2018-10-16 PROCEDURE — 82962 GLUCOSE BLOOD TEST: CPT

## 2018-10-16 PROCEDURE — 71045 X-RAY EXAM CHEST 1 VIEW: CPT

## 2018-10-16 PROCEDURE — 2580000003 HC RX 258: Performed by: EMERGENCY MEDICINE

## 2018-10-16 PROCEDURE — 87040 BLOOD CULTURE FOR BACTERIA: CPT

## 2018-10-16 PROCEDURE — 85610 PROTHROMBIN TIME: CPT

## 2018-10-16 PROCEDURE — 82550 ASSAY OF CK (CPK): CPT

## 2018-10-16 PROCEDURE — 96374 THER/PROPH/DIAG INJ IV PUSH: CPT

## 2018-10-16 PROCEDURE — 84100 ASSAY OF PHOSPHORUS: CPT

## 2018-10-16 PROCEDURE — 6360000002 HC RX W HCPCS: Performed by: EMERGENCY MEDICINE

## 2018-10-16 PROCEDURE — 36415 COLL VENOUS BLD VENIPUNCTURE: CPT

## 2018-10-16 PROCEDURE — 96376 TX/PRO/DX INJ SAME DRUG ADON: CPT

## 2018-10-16 PROCEDURE — 70450 CT HEAD/BRAIN W/O DYE: CPT

## 2018-10-16 PROCEDURE — 2500000003 HC RX 250 WO HCPCS: Performed by: INTERNAL MEDICINE

## 2018-10-16 PROCEDURE — 84443 ASSAY THYROID STIM HORMONE: CPT

## 2018-10-16 PROCEDURE — 85730 THROMBOPLASTIN TIME PARTIAL: CPT

## 2018-10-16 PROCEDURE — 80177 DRUG SCRN QUAN LEVETIRACETAM: CPT

## 2018-10-16 PROCEDURE — 2580000003 HC RX 258: Performed by: INTERNAL MEDICINE

## 2018-10-16 PROCEDURE — 80053 COMPREHEN METABOLIC PANEL: CPT

## 2018-10-16 PROCEDURE — 6360000002 HC RX W HCPCS

## 2018-10-16 RX ORDER — 0.9 % SODIUM CHLORIDE 0.9 %
1000 INTRAVENOUS SOLUTION INTRAVENOUS ONCE
Status: COMPLETED | OUTPATIENT
Start: 2018-10-16 | End: 2018-10-16

## 2018-10-16 RX ORDER — DONEPEZIL HYDROCHLORIDE 10 MG/1
10 TABLET, FILM COATED ORAL NIGHTLY
Status: DISCONTINUED | OUTPATIENT
Start: 2018-10-16 | End: 2018-10-17 | Stop reason: HOSPADM

## 2018-10-16 RX ORDER — CLOPIDOGREL BISULFATE 75 MG/1
75 TABLET ORAL DAILY
Status: DISCONTINUED | OUTPATIENT
Start: 2018-10-17 | End: 2018-10-17 | Stop reason: HOSPADM

## 2018-10-16 RX ORDER — LANOLIN ALCOHOL/MO/W.PET/CERES
2000 CREAM (GRAM) TOPICAL DAILY
Status: DISCONTINUED | OUTPATIENT
Start: 2018-10-17 | End: 2018-10-17 | Stop reason: HOSPADM

## 2018-10-16 RX ORDER — SODIUM CHLORIDE 9 MG/ML
INJECTION, SOLUTION INTRAVENOUS CONTINUOUS
Status: DISCONTINUED | OUTPATIENT
Start: 2018-10-16 | End: 2018-10-16

## 2018-10-16 RX ORDER — PYRIDOXINE HCL (VITAMIN B6) 25 MG
25 TABLET ORAL DAILY
Status: DISCONTINUED | OUTPATIENT
Start: 2018-10-17 | End: 2018-10-17 | Stop reason: HOSPADM

## 2018-10-16 RX ORDER — LORAZEPAM 1 MG/1
1 TABLET ORAL
Status: DISCONTINUED | OUTPATIENT
Start: 2018-10-16 | End: 2018-10-17 | Stop reason: HOSPADM

## 2018-10-16 RX ORDER — LORAZEPAM 1 MG/1
4 TABLET ORAL
Status: DISCONTINUED | OUTPATIENT
Start: 2018-10-16 | End: 2018-10-17 | Stop reason: HOSPADM

## 2018-10-16 RX ORDER — ATORVASTATIN CALCIUM 40 MG/1
40 TABLET, FILM COATED ORAL NIGHTLY
Status: DISCONTINUED | OUTPATIENT
Start: 2018-10-16 | End: 2018-10-17 | Stop reason: HOSPADM

## 2018-10-16 RX ORDER — M-VIT,TX,IRON,MINS/CALC/FOLIC 27MG-0.4MG
1 TABLET ORAL DAILY
Status: DISCONTINUED | OUTPATIENT
Start: 2018-10-17 | End: 2018-10-17 | Stop reason: HOSPADM

## 2018-10-16 RX ORDER — ASPIRIN 81 MG/1
81 TABLET, CHEWABLE ORAL DAILY
Status: DISCONTINUED | OUTPATIENT
Start: 2018-10-17 | End: 2018-10-17 | Stop reason: HOSPADM

## 2018-10-16 RX ORDER — LORAZEPAM 1 MG/1
2 TABLET ORAL
Status: DISCONTINUED | OUTPATIENT
Start: 2018-10-16 | End: 2018-10-17 | Stop reason: HOSPADM

## 2018-10-16 RX ORDER — FLUOXETINE 10 MG/1
10 CAPSULE ORAL DAILY
Status: DISCONTINUED | OUTPATIENT
Start: 2018-10-17 | End: 2018-10-17 | Stop reason: HOSPADM

## 2018-10-16 RX ORDER — LORAZEPAM 2 MG/ML
INJECTION INTRAMUSCULAR
Status: COMPLETED
Start: 2018-10-16 | End: 2018-10-16

## 2018-10-16 RX ORDER — LORAZEPAM 2 MG/ML
2 INJECTION INTRAMUSCULAR
Status: DISCONTINUED | OUTPATIENT
Start: 2018-10-16 | End: 2018-10-17 | Stop reason: HOSPADM

## 2018-10-16 RX ORDER — LORAZEPAM 2 MG/ML
4 INJECTION INTRAMUSCULAR
Status: DISCONTINUED | OUTPATIENT
Start: 2018-10-16 | End: 2018-10-17 | Stop reason: HOSPADM

## 2018-10-16 RX ORDER — LORAZEPAM 1 MG/1
3 TABLET ORAL
Status: DISCONTINUED | OUTPATIENT
Start: 2018-10-16 | End: 2018-10-17 | Stop reason: HOSPADM

## 2018-10-16 RX ORDER — LORAZEPAM 2 MG/ML
3 INJECTION INTRAMUSCULAR
Status: DISCONTINUED | OUTPATIENT
Start: 2018-10-16 | End: 2018-10-17 | Stop reason: HOSPADM

## 2018-10-16 RX ORDER — LORAZEPAM 2 MG/ML
1 INJECTION INTRAMUSCULAR
Status: DISCONTINUED | OUTPATIENT
Start: 2018-10-16 | End: 2018-10-17 | Stop reason: HOSPADM

## 2018-10-16 RX ORDER — SODIUM CHLORIDE 0.9 % (FLUSH) 0.9 %
10 SYRINGE (ML) INJECTION PRN
Status: DISCONTINUED | OUTPATIENT
Start: 2018-10-16 | End: 2018-10-17 | Stop reason: HOSPADM

## 2018-10-16 RX ORDER — LORAZEPAM 2 MG/ML
2 INJECTION INTRAMUSCULAR ONCE
Status: COMPLETED | OUTPATIENT
Start: 2018-10-16 | End: 2018-10-16

## 2018-10-16 RX ADMIN — LORAZEPAM 2 MG: 2 INJECTION INTRAMUSCULAR at 17:35

## 2018-10-16 RX ADMIN — SODIUM CHLORIDE, PRESERVATIVE FREE 10 ML: 5 INJECTION INTRAVENOUS at 22:44

## 2018-10-16 RX ADMIN — SODIUM CHLORIDE 1000 ML: 9 INJECTION, SOLUTION INTRAVENOUS at 17:35

## 2018-10-16 RX ADMIN — ATORVASTATIN CALCIUM 40 MG: 40 TABLET, FILM COATED ORAL at 22:52

## 2018-10-16 RX ADMIN — DONEPEZIL HYDROCHLORIDE 10 MG: 10 TABLET, FILM COATED ORAL at 22:52

## 2018-10-16 RX ADMIN — THIAMINE HYDROCHLORIDE: 100 INJECTION, SOLUTION INTRAMUSCULAR; INTRAVENOUS at 23:03

## 2018-10-16 RX ADMIN — LEVETIRACETAM 1000 MG: 500 INJECTION, SOLUTION, CONCENTRATE INTRAVENOUS at 22:45

## 2018-10-16 RX ADMIN — SODIUM CHLORIDE, PRESERVATIVE FREE 10 ML: 5 INJECTION INTRAVENOUS at 23:03

## 2018-10-16 RX ADMIN — LEVETIRACETAM 1000 MG: 500 INJECTION, SOLUTION, CONCENTRATE INTRAVENOUS at 19:34

## 2018-10-16 RX ADMIN — LORAZEPAM 2 MG: 2 INJECTION INTRAMUSCULAR; INTRAVENOUS at 17:35

## 2018-10-16 ASSESSMENT — ENCOUNTER SYMPTOMS
GASTROINTESTINAL NEGATIVE: 1
EYES NEGATIVE: 1
RESPIRATORY NEGATIVE: 1
ALLERGIC/IMMUNOLOGIC NEGATIVE: 1

## 2018-10-16 NOTE — ED NOTES
1812 paged Dr Tremaine Cooley  10/16/18 1805 [phone] Dr Eewlina Au returned call      Jen Frankel  10/16/18 [phone]

## 2018-10-16 NOTE — ED PROVIDER NOTES
Plasma   Result Value Ref Range    Lactate 0.8 0.4 - 2.0 mMOL/L   Lactic Acid, Plasma   Result Value Ref Range    Lactate 0.7 0.4 - 2.0 mMOL/L   CK   Result Value Ref Range    Total  38 - 174 IU/L   POCT Glucose   Result Value Ref Range    POC Glucose 104 (H) 70 - 99 MG/DL        Radiographs (if obtained):  [] The following radiograph was interpreted by myself in the absence of a radiologist:  [x] Radiologist's Report Reviewed:    CXR, CT Brain    EKG (if obtained): (All EKG's are interpreted by myself in the absence of a cardiologist)      Total critical care time today provided was at least 35 minutes. This excludes seperately billable procedure. Critical care time provided for reviewing labs, images consulting neurology consulting medicine giving Keppra giving oxygen treating seizure that required close evaluation and/or intervention with concern for patient decompensation. MDM:    Patient here with seizure activity and he has a history of seizures he had a seizure today and his neurologist parking lot he never saw his neurologist.  He had a typical seizure for him or his left arm locks up, partial seizure. This lasted about 10 minutes he states he feels better now he appears very well he's in no distress his exam is normal I do not see any cranial nerve deficits his a normal finger to nose no weakness no sensory deficit he has good pulses he has no chest pain or shortness of breath. We'll check basic lab work, Keppra level EKG. I did order EKG however unfortunately I never saw one. Patient did have a small seizure here that my colleague gave Ativan for as I was involved in the care of another critical patient doing when he had a seizure. Patient did desaturate slightly I did put him on oxygen he responded well. I did talk to his neurologist who would like 1 g of Keppra now a gram of Keppra every 12 hours, also like patient admitted for observation.   Labs otherwise negative CT brain negative I did talk to hospital medicine patient admitted for observation for seizures. Otherwise patient stable. Family updated.     CLINICAL IMPRESSION:  Final diagnoses:   Seizure ([facility] [address]   Leukocytosis, unspecified type   History of stroke       (Please note that portions of this note may have been completed with a voice recognition program. Efforts were made to edit the dictations but occasionally words aremis-transcribed.)    DISPOSITION REFERRAL (if applicable):  Felix Osborne MD  [facility] 3968 [phone]            DISPOSITION MEDICATIONS (if applicable):  Current Discharge Medication List             Hollis Granda, [address] [place] [address],   10/17/18 7415

## 2018-10-16 NOTE — ED NOTES
1854 paged hospitalist     Nathaniel Vazquez  10/16/18 [address] hospitalist returned call      Nathaniel Vazquez  10/16/18 [id number] [facility]

## 2018-10-16 NOTE — PROGRESS NOTES
Juvenal  · Donepezil dosage change from 5 mg to 10 mg at HS per Dr. Sayra Calvo    Other Comments:  · Medication list reviewed with patient and insurance claims verified    To my knowledge the above medication history is accurate as of 10/16/2018 7:16 PM.   Gosia Saxena CPhT   10/16/2018 7:16 PM     Medication history reviewed with pharmacy technician.   Chelsy Dunbar, PreetD, BCPS

## 2018-10-16 NOTE — ED NOTES
Bed: 04TR-04  Expected date:   Expected time:   Means of arrival:   Comments:  Medic [address], Counts include 234 beds at the [facility]  10/16/18 5975

## 2018-10-17 VITALS
OXYGEN SATURATION: 97 % | HEART RATE: 98 BPM | SYSTOLIC BLOOD PRESSURE: 158 MMHG | WEIGHT: 125 LBS | HEIGHT: 64 IN | BODY MASS INDEX: 21.34 KG/M2 | TEMPERATURE: 98.6 F | RESPIRATION RATE: 19 BRPM | DIASTOLIC BLOOD PRESSURE: 97 MMHG

## 2018-10-17 LAB
ANION GAP SERPL CALCULATED.3IONS-SCNC: 10 MMOL/L (ref 4–16)
BACTERIA: NEGATIVE /HPF
BASOPHILS ABSOLUTE: 0.1 K/CU MM
BASOPHILS RELATIVE PERCENT: 1 % (ref 0–1)
BILIRUBIN URINE: NEGATIVE MG/DL
BLOOD, URINE: NEGATIVE
BUN BLDV-MCNC: 12 MG/DL (ref 6–23)
CALCIUM SERPL-MCNC: 8.7 MG/DL (ref 8.3–10.6)
CHLORIDE BLD-SCNC: 102 MMOL/L (ref 99–110)
CLARITY: CLEAR
CO2: 26 MMOL/L (ref 21–32)
COLOR: YELLOW
CREAT SERPL-MCNC: 0.9 MG/DL (ref 0.9–1.3)
DIFFERENTIAL TYPE: ABNORMAL
EOSINOPHILS ABSOLUTE: 0.1 K/CU MM
EOSINOPHILS RELATIVE PERCENT: 1.4 % (ref 0–3)
GFR AFRICAN AMERICAN: >60 ML/MIN/1.73M2
GFR NON-AFRICAN AMERICAN: >60 ML/MIN/1.73M2
GLUCOSE BLD-MCNC: 80 MG/DL (ref 70–99)
GLUCOSE, URINE: NEGATIVE MG/DL
HCT VFR BLD CALC: 35.6 % (ref 42–52)
HEMOGLOBIN: 11.3 GM/DL (ref 13.5–18)
IMMATURE NEUTROPHIL %: 0.4 % (ref 0–0.43)
KETONES, URINE: NEGATIVE MG/DL
LEUKOCYTE ESTERASE, URINE: NEGATIVE
LYMPHOCYTES ABSOLUTE: 2 K/CU MM
LYMPHOCYTES RELATIVE PERCENT: 27 % (ref 24–44)
MCH RBC QN AUTO: 31.6 PG (ref 27–31)
MCHC RBC AUTO-ENTMCNC: 31.7 % (ref 32–36)
MCV RBC AUTO: 99.4 FL (ref 78–100)
MONOCYTES ABSOLUTE: 0.8 K/CU MM
MONOCYTES RELATIVE PERCENT: 10.7 % (ref 0–4)
NITRITE URINE, QUANTITATIVE: NEGATIVE
NUCLEATED RBC %: 0 %
PDW BLD-RTO: 14.2 % (ref 11.7–14.9)
PH, URINE: 5 (ref 5–8)
PLATELET # BLD: 296 K/CU MM (ref 140–440)
PMV BLD AUTO: 9.1 FL (ref 7.5–11.1)
POTASSIUM SERPL-SCNC: 4.5 MMOL/L (ref 3.5–5.1)
PROTEIN UA: NEGATIVE MG/DL
RBC # BLD: 3.58 M/CU MM (ref 4.6–6.2)
RBC URINE: <1 /HPF (ref 0–3)
SEGMENTED NEUTROPHILS ABSOLUTE COUNT: 4.3 K/CU MM
SEGMENTED NEUTROPHILS RELATIVE PERCENT: 59.5 % (ref 36–66)
SODIUM BLD-SCNC: 138 MMOL/L (ref 135–145)
SPECIFIC GRAVITY UA: 1.01 (ref 1–1.03)
TOTAL IMMATURE NEUTOROPHIL: 0.03 K/CU MM
TOTAL NUCLEATED RBC: 0 K/CU MM
TRICHOMONAS: NORMAL /HPF
UROBILINOGEN, URINE: NORMAL MG/DL (ref 0.2–1)
WBC # BLD: 7.2 K/CU MM (ref 4–10.5)
WBC UA: <1 /HPF (ref 0–2)

## 2018-10-17 PROCEDURE — G0378 HOSPITAL OBSERVATION PER HR: HCPCS

## 2018-10-17 PROCEDURE — 90686 IIV4 VACC NO PRSV 0.5 ML IM: CPT | Performed by: INTERNAL MEDICINE

## 2018-10-17 PROCEDURE — 81001 URINALYSIS AUTO W/SCOPE: CPT

## 2018-10-17 PROCEDURE — 36415 COLL VENOUS BLD VENIPUNCTURE: CPT

## 2018-10-17 PROCEDURE — 87086 URINE CULTURE/COLONY COUNT: CPT

## 2018-10-17 PROCEDURE — 80048 BASIC METABOLIC PNL TOTAL CA: CPT

## 2018-10-17 PROCEDURE — 6360000002 HC RX W HCPCS: Performed by: INTERNAL MEDICINE

## 2018-10-17 PROCEDURE — 94761 N-INVAS EAR/PLS OXIMETRY MLT: CPT

## 2018-10-17 PROCEDURE — 6370000000 HC RX 637 (ALT 250 FOR IP): Performed by: INTERNAL MEDICINE

## 2018-10-17 PROCEDURE — 2580000003 HC RX 258: Performed by: INTERNAL MEDICINE

## 2018-10-17 PROCEDURE — 85025 COMPLETE CBC W/AUTO DIFF WBC: CPT

## 2018-10-17 PROCEDURE — 96376 TX/PRO/DX INJ SAME DRUG ADON: CPT

## 2018-10-17 PROCEDURE — G0008 ADMIN INFLUENZA VIRUS VAC: HCPCS | Performed by: INTERNAL MEDICINE

## 2018-10-17 RX ADMIN — MULTIPLE VITAMINS W/ MINERALS TAB 1 TABLET: TAB at 09:47

## 2018-10-17 RX ADMIN — INFLUENZA A VIRUS A/MICHIGAN/45/2015 X-275 (H1N1) ANTIGEN (FORMALDEHYDE INACTIVATED), INFLUENZA A VIRUS A/SINGAPORE/INFIMH-16-0019/2016 IVR-186 (H3N2) ANTIGEN (FORMALDEHYDE INACTIVATED), INFLUENZA B VIRUS B/PHUKET/3073/2013 ANTIGEN (FORMALDEHYDE INACTIVATED), AND INFLUENZA B VIRUS B/MARYLAND/15/2016 BX-69A ANTIGEN (FORMALDEHYDE INACTIVATED) 0.5 ML: 15; 15; 15; 15 INJECTION, SUSPENSION INTRAMUSCULAR at 10:08

## 2018-10-17 RX ADMIN — FLUOXETINE 10 MG: 10 CAPSULE ORAL at 09:47

## 2018-10-17 RX ADMIN — CYANOCOBALAMIN TAB 1000 MCG 2000 MCG: 1000 TAB at 09:46

## 2018-10-17 RX ADMIN — LEVETIRACETAM 1000 MG: 500 INJECTION, SOLUTION, CONCENTRATE INTRAVENOUS at 08:35

## 2018-10-17 RX ADMIN — ASPIRIN 81 MG CHEWABLE TABLET 81 MG: 81 TABLET CHEWABLE at 09:45

## 2018-10-17 RX ADMIN — Medication 25 MG: at 10:24

## 2018-10-17 RX ADMIN — VITAMIN E CAP 100 UNIT 200 UNITS: 100 CAP at 09:45

## 2018-10-17 RX ADMIN — CLOPIDOGREL BISULFATE 75 MG: 75 TABLET ORAL at 09:47

## 2018-10-17 ASSESSMENT — PAIN SCALES - GENERAL: PAINLEVEL_OUTOF10: 0

## 2018-10-17 NOTE — DISCHARGE SUMMARY
Discharge Summary    Name:  Ana Delgado /Age/Sex: 1960  (63 y.o. male)   MRN & CSN:  [id number] & [id number] Admission Date/Time: 10/16/2018  5:08 PM   Attending:  Gal Gardner MD Discharging Physician: Altagracia Diaz MD     HPI and Hospital Course:   Ana Delgado is a 63 y.o.  male  who presents with seizure    HPI- as per H and [address]:     1-Breakthrough seizure with underlying hx of seizure- on keppra chronically, keppra dose increased to 1000mg bid as per neuro, and okayed for discharge by neuro. 2-Leukcoytosis/lactic acidosis- due to above, resolved. 3-Alcohol abuse- no signs of withdrawal     Other chronic conditions:  -HTN  -stroke  The patient expressed appropriate understanding of and agreement with the discharge recommendations, medications, and plan. Consults this admission:  IP CONSULT TO NEUROLOGY  IP CONSULT TO HOSPITALIST    Discharge Instruction:   Follow up appointments:   Primary care physician:  within 2 weeks    Diet:  General/cardiac/ADA/as tolerated  Activity: {discharge activity: as tolerated  Disposition: Discharged to:   [x]Home, []C, []SNF, []Acute Rehab, []Hospice   Condition on discharge: Stable    Discharge Medications:       Albert Vargas   Home Medication Instructions [id number]    Printed on:10/17/18 1311   Medication Information                      aspirin 81 MG chewable tablet  Take 1 tablet by mouth daily             atorvastatin (LIPITOR) 40 MG tablet  TAKE ONE TABLET BY MOUTH ONCE NIGHTLY             clopidogrel (PLAVIX) 75 MG tablet  Take 1 tablet by mouth daily             Cyanocobalamin (B-12) 2000 MCG TABS  Take 1 tablet by mouth daily              donepezil (ARICEPT) 10 MG tablet  Take 10 mg by mouth nightly              FLUoxetine (PROZAC) 10 MG capsule  Take 1 capsule by mouth daily             FOLIC ACID PO  Take 2.5 mg by mouth daily              levETIRAcetam (KEPPRA) 500 MG tablet  Take 1,000 mg by mouth 2 times

## 2018-10-17 NOTE — CONSULTS
date: Seizures [facility])  Past Surgical History:    History reviewed. No pertinent surgical history. Current Medications:   Current Facility-Administered Medications: aspirin chewable tablet 81 mg, 81 mg, Oral, Dailyatorvastatin (LIPITOR) tablet 40 mg, 40 mg, Oral, Nightlyclopidogrel (PLAVIX) tablet 75 mg, 75 mg, Oral, Dailyvitamin B-12 (CYANOCOBALAMIN) tablet 2,000 mcg, 2,000 mcg, Oral, Dailydonepezil (ARICEPT) tablet 10 mg, 10 mg, Oral, NightlyFLUoxetine (PROZAC) capsule 10 mg, 10 mg, Oral, Dailytherapeutic multivitamin-minerals 1 tablet, 1 tablet, Oral, Dailypyridoxine (B-6) tablet 25 mg, 25 mg, Oral, Dailyvitamin E capsule 200 Units, 200 Units, Oral, DailylevETIRAcetam (KEPPRA) 1,000 mg in sodium chloride 0.9 % 100 mL IVPB, 1,000 mg, Intravenous, [id number] chloride flush 0.9 % injection 10 mL, 10 mL, Intravenous, PRNsodium chloride 0.9 % 5,021 mL with folic acid 1 mg, adult multi-vitamin with vitamin k 10 mL, thiamine 100 mg, , Intravenous, Dailyenoxaparin (LOVENOX) injection 40 mg, 40 mg, Subcutaneous, DailyLORazepam (ATIVAN) tablet 1 mg, 1 mg, Oral, Q1H PRN **OR** LORazepam (ATIVAN) injection 1 mg, 1 mg, Intravenous, Q1H PRN **OR** LORazepam (ATIVAN) tablet 2 mg, 2 mg, Oral, Q1H PRN **OR** LORazepam (ATIVAN) injection 2 mg, 2 mg, Intravenous, Q1H PRN **OR** LORazepam (ATIVAN) tablet 3 mg, 3 mg, Oral, Q1H PRN **OR** LORazepam (ATIVAN) injection 3 mg, 3 mg, Intravenous, Q1H PRN **OR** LORazepam (ATIVAN) tablet 4 mg, 4 mg, Oral, Q1H PRN **OR** LORazepam (ATIVAN) injection 4 mg, 4 mg, Intravenous, Q1H PRN  Allergies:  Shellfish-derived products    Social History:  TOBACCO:   reports that he quit smoking about 9 months ago. His smoking use included Cigarettes. He smoked 0.25 packs per day. He has never used smokeless tobacco.  ETOH:   reports that he drinks alcohol. DRUGS:   reports that he does not use drugs.   Family History:   Review of patient's family history indicates:  Problem: Stroke      Relation: Sister 0.1                 10/17/2018                 DIFFTYPE                                     10/17/2018             AUTOMATED DIFFERENTIAL  CMP:  Lab Results       Component                Value               Date                       NA                       138                 10/17/2018                 K                        4.5                 10/17/2018                 CL                       102                 10/17/2018                 CO2                      26                  10/17/2018                 BUN                      12                  10/17/2018                 CREATININE               0.9                 10/17/2018                 GFRAA                    >60                 10/17/2018                 AGRATIO                  1.6                 02/05/2018                 LABGLOM                  >60                 10/17/2018                 GLUCOSE                  80                  10/17/2018                 PROT                     7.3                 10/16/2018                 LABALBU                  4.1                 10/16/2018                 CALCIUM                  8.7                 10/17/2018                 BILITOT                  0.4                 10/16/2018                 ALKPHOS                  159                 10/16/2018                 AST                      24                  10/16/2018                 ALT                      21                  10/16/2018            BMP:  Lab Results       Component                Value               Date                       NA                       138                 10/17/2018                 K                        4.5                 10/17/2018                 CL                       102                 10/17/2018                 CO2                      26                  10/17/2018                 BUN                      12                  10/17/2018                 LABALBU                  4.1

## 2018-10-18 LAB
CULTURE: NORMAL
KEPPRA: 36
Lab: NORMAL
REPORT STATUS: NORMAL
SPECIMEN: NORMAL

## 2018-10-22 LAB
CULTURE: NORMAL
Lab: NORMAL
REPORT STATUS: NORMAL
SPECIMEN: NORMAL

## 2018-11-07 RX ORDER — FLUOXETINE 10 MG/1
CAPSULE ORAL
Qty: 30 CAPSULE | Refills: 0 | Status: SHIPPED | OUTPATIENT
Start: 2018-11-07 | End: 2018-11-19

## 2018-11-19 ENCOUNTER — OFFICE VISIT (OUTPATIENT)
Dept: FAMILY MEDICINE CLINIC | Age: 58
End: 2018-11-19
Payer: MEDICARE

## 2018-11-19 VITALS
TEMPERATURE: 98.2 F | DIASTOLIC BLOOD PRESSURE: 84 MMHG | WEIGHT: 126.4 LBS | SYSTOLIC BLOOD PRESSURE: 138 MMHG | HEART RATE: 87 BPM | BODY MASS INDEX: 21.7 KG/M2 | OXYGEN SATURATION: 98 %

## 2018-11-19 DIAGNOSIS — R19.7 DIARRHEA, UNSPECIFIED TYPE: ICD-10-CM

## 2018-11-19 DIAGNOSIS — I63.311 CEREBROVASCULAR ACCIDENT (CVA) DUE TO THROMBOSIS OF RIGHT MIDDLE CEREBRAL ARTERY (HCC): Primary | ICD-10-CM

## 2018-11-19 DIAGNOSIS — R45.86 MOOD CHANGES: ICD-10-CM

## 2018-11-19 LAB
BASOPHILS ABSOLUTE: 0.1 K/UL (ref 0–0.2)
BASOPHILS RELATIVE PERCENT: 0.6 %
EOSINOPHILS ABSOLUTE: 0.1 K/UL (ref 0–0.6)
EOSINOPHILS RELATIVE PERCENT: 0.9 %
HCT VFR BLD CALC: 35.9 % (ref 40.5–52.5)
HEMOGLOBIN: 12.1 G/DL (ref 13.5–17.5)
LYMPHOCYTES ABSOLUTE: 1.8 K/UL (ref 1–5.1)
LYMPHOCYTES RELATIVE PERCENT: 20.6 %
MCH RBC QN AUTO: 32.5 PG (ref 26–34)
MCHC RBC AUTO-ENTMCNC: 33.7 G/DL (ref 31–36)
MCV RBC AUTO: 96.6 FL (ref 80–100)
MONOCYTES ABSOLUTE: 0.7 K/UL (ref 0–1.3)
MONOCYTES RELATIVE PERCENT: 7.7 %
NEUTROPHILS ABSOLUTE: 6.3 K/UL (ref 1.7–7.7)
NEUTROPHILS RELATIVE PERCENT: 70.2 %
PDW BLD-RTO: 14.4 % (ref 12.4–15.4)
PLATELET # BLD: 383 K/UL (ref 135–450)
PMV BLD AUTO: 7.4 FL (ref 5–10.5)
RBC # BLD: 3.72 M/UL (ref 4.2–5.9)
WBC # BLD: 9 K/UL (ref 4–11)

## 2018-11-19 PROCEDURE — 1036F TOBACCO NON-USER: CPT | Performed by: FAMILY MEDICINE

## 2018-11-19 PROCEDURE — G8420 CALC BMI NORM PARAMETERS: HCPCS | Performed by: FAMILY MEDICINE

## 2018-11-19 PROCEDURE — 99214 OFFICE O/P EST MOD 30 MIN: CPT | Performed by: FAMILY MEDICINE

## 2018-11-19 PROCEDURE — 36415 COLL VENOUS BLD VENIPUNCTURE: CPT | Performed by: FAMILY MEDICINE

## 2018-11-19 PROCEDURE — G8427 DOCREV CUR MEDS BY ELIG CLIN: HCPCS | Performed by: FAMILY MEDICINE

## 2018-11-19 PROCEDURE — G8482 FLU IMMUNIZE ORDER/ADMIN: HCPCS | Performed by: FAMILY MEDICINE

## 2018-11-19 PROCEDURE — G8598 ASA/ANTIPLAT THER USED: HCPCS | Performed by: FAMILY MEDICINE

## 2018-11-19 PROCEDURE — 3017F COLORECTAL CA SCREEN DOC REV: CPT | Performed by: FAMILY MEDICINE

## 2018-11-19 RX ORDER — FLUOXETINE 10 MG/1
10 CAPSULE ORAL DAILY
Qty: 90 CAPSULE | Refills: 1 | Status: SHIPPED | OUTPATIENT
Start: 2018-11-19 | End: 2019-08-08 | Stop reason: SDUPTHER

## 2018-11-19 ASSESSMENT — ENCOUNTER SYMPTOMS
COUGH: 1
RHINORRHEA: 1

## 2018-11-20 LAB
A/G RATIO: 1.4 (ref 1.1–2.2)
ALBUMIN SERPL-MCNC: 4.3 G/DL (ref 3.4–5)
ALP BLD-CCNC: 144 U/L (ref 40–129)
ALT SERPL-CCNC: 18 U/L (ref 10–40)
ANION GAP SERPL CALCULATED.3IONS-SCNC: 15 MMOL/L (ref 3–16)
AST SERPL-CCNC: 25 U/L (ref 15–37)
BILIRUB SERPL-MCNC: 0.3 MG/DL (ref 0–1)
BUN BLDV-MCNC: 13 MG/DL (ref 7–20)
CALCIUM SERPL-MCNC: 10 MG/DL (ref 8.3–10.6)
CHLORIDE BLD-SCNC: 96 MMOL/L (ref 99–110)
CO2: 26 MMOL/L (ref 21–32)
CREAT SERPL-MCNC: 0.9 MG/DL (ref 0.9–1.3)
GFR AFRICAN AMERICAN: >60
GFR NON-AFRICAN AMERICAN: >60
GLOBULIN: 3.1 G/DL
GLUCOSE BLD-MCNC: 193 MG/DL (ref 70–99)
POTASSIUM SERPL-SCNC: 4.6 MMOL/L (ref 3.5–5.1)
SODIUM BLD-SCNC: 137 MMOL/L (ref 136–145)
TOTAL PROTEIN: 7.4 G/DL (ref 6.4–8.2)

## 2019-01-13 ENCOUNTER — HOSPITAL ENCOUNTER (EMERGENCY)
Age: 59
Discharge: HOME | End: 2019-01-13
Attending: EMERGENCY MEDICINE
Payer: MEDICARE

## 2019-01-13 VITALS
WEIGHT: 126 LBS | TEMPERATURE: 97.7 F | OXYGEN SATURATION: 96 % | RESPIRATION RATE: 16 BRPM | HEIGHT: 64 IN | DIASTOLIC BLOOD PRESSURE: 89 MMHG | HEART RATE: 64 BPM | SYSTOLIC BLOOD PRESSURE: 143 MMHG | BODY MASS INDEX: 21.51 KG/M2

## 2019-01-13 DIAGNOSIS — R56.9 SEIZURE (HCC): Primary | ICD-10-CM

## 2019-01-13 LAB
ALBUMIN SERPL-MCNC: 4.2 GM/DL (ref 3.4–5)
ALCOHOL SCREEN SERUM: <0.01 %WT/VOL
ALP BLD-CCNC: 148 IU/L (ref 40–129)
ALT SERPL-CCNC: 25 U/L (ref 10–40)
AMPHETAMINES: NEGATIVE
ANION GAP SERPL CALCULATED.3IONS-SCNC: 14 MMOL/L (ref 4–16)
AST SERPL-CCNC: 27 IU/L (ref 15–37)
BARBITURATE SCREEN URINE: NEGATIVE
BASOPHILS ABSOLUTE: 0.1 K/CU MM
BASOPHILS RELATIVE PERCENT: 0.8 % (ref 0–1)
BENZODIAZEPINE SCREEN, URINE: ABNORMAL
BILIRUB SERPL-MCNC: 0.5 MG/DL (ref 0–1)
BUN BLDV-MCNC: 12 MG/DL (ref 6–23)
CALCIUM SERPL-MCNC: 9.2 MG/DL (ref 8.3–10.6)
CANNABINOID SCREEN URINE: ABNORMAL
CHLORIDE BLD-SCNC: 97 MMOL/L (ref 99–110)
CO2: 25 MMOL/L (ref 21–32)
COCAINE METABOLITE: ABNORMAL
CREAT SERPL-MCNC: 1.1 MG/DL (ref 0.9–1.3)
DIFFERENTIAL TYPE: ABNORMAL
EOSINOPHILS ABSOLUTE: 0.1 K/CU MM
EOSINOPHILS RELATIVE PERCENT: 0.8 % (ref 0–3)
GFR AFRICAN AMERICAN: >60 ML/MIN/1.73M2
GFR NON-AFRICAN AMERICAN: >60 ML/MIN/1.73M2
GLUCOSE BLD-MCNC: 133 MG/DL (ref 70–99)
HCT VFR BLD CALC: 38 % (ref 42–52)
HEMOGLOBIN: 12.4 GM/DL (ref 13.5–18)
IMMATURE NEUTROPHIL %: 0.6 % (ref 0–0.43)
LACTATE: 1.8 MMOL/L (ref 0.4–2)
LACTATE: 3.7 MMOL/L (ref 0.4–2)
LYMPHOCYTES ABSOLUTE: 1.1 K/CU MM
LYMPHOCYTES RELATIVE PERCENT: 10.6 % (ref 24–44)
MAGNESIUM: 2.2 MG/DL (ref 1.8–2.4)
MCH RBC QN AUTO: 32 PG (ref 27–31)
MCHC RBC AUTO-ENTMCNC: 32.6 % (ref 32–36)
MCV RBC AUTO: 98.2 FL (ref 78–100)
MONOCYTES ABSOLUTE: 0.8 K/CU MM
MONOCYTES RELATIVE PERCENT: 7.3 % (ref 0–4)
NUCLEATED RBC %: 0 %
OPIATES, URINE: NEGATIVE
OXYCODONE: NEGATIVE
PDW BLD-RTO: 14.2 % (ref 11.7–14.9)
PHENCYCLIDINE, URINE: NEGATIVE
PLATELET # BLD: 347 K/CU MM (ref 140–440)
PMV BLD AUTO: 9.1 FL (ref 7.5–11.1)
POTASSIUM SERPL-SCNC: 4.1 MMOL/L (ref 3.5–5.1)
RBC # BLD: 3.87 M/CU MM (ref 4.6–6.2)
SEGMENTED NEUTROPHILS ABSOLUTE COUNT: 8.5 K/CU MM
SEGMENTED NEUTROPHILS RELATIVE PERCENT: 79.9 % (ref 36–66)
SODIUM BLD-SCNC: 136 MMOL/L (ref 135–145)
TOTAL CK: 87 IU/L (ref 38–174)
TOTAL IMMATURE NEUTOROPHIL: 0.06 K/CU MM
TOTAL NUCLEATED RBC: 0 K/CU MM
TOTAL PROTEIN: 7.4 GM/DL (ref 6.4–8.2)
WBC # BLD: 10.6 K/CU MM (ref 4–10.5)

## 2019-01-13 PROCEDURE — 93010 ELECTROCARDIOGRAM REPORT: CPT | Performed by: INTERNAL MEDICINE

## 2019-01-13 PROCEDURE — 6360000002 HC RX W HCPCS: Performed by: EMERGENCY MEDICINE

## 2019-01-13 PROCEDURE — 2580000003 HC RX 258: Performed by: EMERGENCY MEDICINE

## 2019-01-13 PROCEDURE — G0480 DRUG TEST DEF 1-7 CLASSES: HCPCS

## 2019-01-13 PROCEDURE — 99284 EMERGENCY DEPT VISIT MOD MDM: CPT

## 2019-01-13 PROCEDURE — 83605 ASSAY OF LACTIC ACID: CPT

## 2019-01-13 PROCEDURE — 96365 THER/PROPH/DIAG IV INF INIT: CPT

## 2019-01-13 PROCEDURE — 80053 COMPREHEN METABOLIC PANEL: CPT

## 2019-01-13 PROCEDURE — 36415 COLL VENOUS BLD VENIPUNCTURE: CPT

## 2019-01-13 PROCEDURE — 93005 ELECTROCARDIOGRAM TRACING: CPT | Performed by: EMERGENCY MEDICINE

## 2019-01-13 PROCEDURE — 83735 ASSAY OF MAGNESIUM: CPT

## 2019-01-13 PROCEDURE — 82550 ASSAY OF CK (CPK): CPT

## 2019-01-13 PROCEDURE — 80307 DRUG TEST PRSMV CHEM ANLYZR: CPT

## 2019-01-13 PROCEDURE — 85025 COMPLETE CBC W/AUTO DIFF WBC: CPT

## 2019-01-13 RX ORDER — 0.9 % SODIUM CHLORIDE 0.9 %
1000 INTRAVENOUS SOLUTION INTRAVENOUS ONCE
Status: COMPLETED | OUTPATIENT
Start: 2019-01-13 | End: 2019-01-13

## 2019-01-13 RX ADMIN — SODIUM CHLORIDE 1000 ML: 9 INJECTION, SOLUTION INTRAVENOUS at 16:40

## 2019-01-13 RX ADMIN — SODIUM CHLORIDE 1000 ML: 9 INJECTION, SOLUTION INTRAVENOUS at 13:44

## 2019-01-13 RX ADMIN — LEVETIRACETAM 1000 MG: 100 INJECTION, SOLUTION INTRAVENOUS at 15:28

## 2019-01-17 LAB
EKG ATRIAL RATE: 112 BPM
EKG DIAGNOSIS: NORMAL
EKG P AXIS: 44 DEGREES
EKG P-R INTERVAL: 120 MS
EKG Q-T INTERVAL: 364 MS
EKG QRS DURATION: 124 MS
EKG QTC CALCULATION (BAZETT): 496 MS
EKG R AXIS: 13 DEGREES
EKG T AXIS: 12 DEGREES
EKG VENTRICULAR RATE: 112 BPM

## 2019-02-04 DIAGNOSIS — R45.86 MOOD CHANGES: ICD-10-CM

## 2019-02-04 RX ORDER — FLUOXETINE 10 MG/1
10 CAPSULE ORAL DAILY
Qty: 90 CAPSULE | Refills: 1 | OUTPATIENT
Start: 2019-02-04

## 2019-02-13 ENCOUNTER — OFFICE VISIT (OUTPATIENT)
Dept: FAMILY MEDICINE CLINIC | Age: 59
End: 2019-02-13
Payer: MEDICARE

## 2019-02-13 VITALS
DIASTOLIC BLOOD PRESSURE: 84 MMHG | TEMPERATURE: 96.9 F | WEIGHT: 124.4 LBS | HEART RATE: 76 BPM | SYSTOLIC BLOOD PRESSURE: 136 MMHG | BODY MASS INDEX: 21.35 KG/M2 | OXYGEN SATURATION: 97 %

## 2019-02-13 DIAGNOSIS — F10.20 ALCOHOLISM (HCC): ICD-10-CM

## 2019-02-13 DIAGNOSIS — I63.311 CEREBROVASCULAR ACCIDENT (CVA) DUE TO THROMBOSIS OF RIGHT MIDDLE CEREBRAL ARTERY (HCC): Primary | ICD-10-CM

## 2019-02-13 DIAGNOSIS — G40.909 SEIZURE DISORDER (HCC): ICD-10-CM

## 2019-02-13 LAB
CHOLESTEROL, FASTING: 141 MG/DL (ref 0–199)
HDLC SERPL-MCNC: 71 MG/DL (ref 40–60)
LDL CHOLESTEROL CALCULATED: 53 MG/DL
TRIGLYCERIDE, FASTING: 84 MG/DL (ref 0–150)
VLDLC SERPL CALC-MCNC: 17 MG/DL

## 2019-02-13 PROCEDURE — 36415 COLL VENOUS BLD VENIPUNCTURE: CPT | Performed by: FAMILY MEDICINE

## 2019-02-13 PROCEDURE — G8598 ASA/ANTIPLAT THER USED: HCPCS | Performed by: FAMILY MEDICINE

## 2019-02-13 PROCEDURE — 99214 OFFICE O/P EST MOD 30 MIN: CPT | Performed by: FAMILY MEDICINE

## 2019-02-13 PROCEDURE — G8482 FLU IMMUNIZE ORDER/ADMIN: HCPCS | Performed by: FAMILY MEDICINE

## 2019-02-13 PROCEDURE — 1036F TOBACCO NON-USER: CPT | Performed by: FAMILY MEDICINE

## 2019-02-13 PROCEDURE — G8420 CALC BMI NORM PARAMETERS: HCPCS | Performed by: FAMILY MEDICINE

## 2019-02-13 PROCEDURE — 3017F COLORECTAL CA SCREEN DOC REV: CPT | Performed by: FAMILY MEDICINE

## 2019-02-13 PROCEDURE — G8427 DOCREV CUR MEDS BY ELIG CLIN: HCPCS | Performed by: FAMILY MEDICINE

## 2019-02-13 RX ORDER — CLOPIDOGREL BISULFATE 75 MG/1
75 TABLET ORAL DAILY
Qty: 90 TABLET | Refills: 0 | Status: SHIPPED | OUTPATIENT
Start: 2019-02-13 | End: 2019-05-13 | Stop reason: SDUPTHER

## 2019-02-13 ASSESSMENT — PATIENT HEALTH QUESTIONNAIRE - PHQ9
SUM OF ALL RESPONSES TO PHQ QUESTIONS 1-9: 0
1. LITTLE INTEREST OR PLEASURE IN DOING THINGS: 0
2. FEELING DOWN, DEPRESSED OR HOPELESS: 0
SUM OF ALL RESPONSES TO PHQ QUESTIONS 1-9: 0
SUM OF ALL RESPONSES TO PHQ9 QUESTIONS 1 & 2: 0

## 2019-02-13 ASSESSMENT — ENCOUNTER SYMPTOMS: SHORTNESS OF BREATH: 0

## 2019-03-06 ENCOUNTER — APPOINTMENT (OUTPATIENT)
Dept: GENERAL RADIOLOGY | Age: 59
DRG: 053 | End: 2019-03-06
Payer: MEDICARE

## 2019-03-06 ENCOUNTER — APPOINTMENT (OUTPATIENT)
Dept: CT IMAGING | Age: 59
DRG: 053 | End: 2019-03-06
Payer: MEDICARE

## 2019-03-06 ENCOUNTER — HOSPITAL ENCOUNTER (INPATIENT)
Age: 59
LOS: 3 days | Discharge: ANOTHER HOSPITAL | DRG: 053 | End: 2019-03-09
Attending: EMERGENCY MEDICINE | Admitting: INTERNAL MEDICINE
Payer: MEDICARE

## 2019-03-06 DIAGNOSIS — S00.83XA CONTUSION OF OTHER PART OF HEAD, INITIAL ENCOUNTER: ICD-10-CM

## 2019-03-06 DIAGNOSIS — G40.901 STATUS EPILEPTICUS (HCC): Primary | ICD-10-CM

## 2019-03-06 PROBLEM — R56.9 SEIZURE (HCC): Status: ACTIVE | Noted: 2019-03-06

## 2019-03-06 LAB
ALBUMIN SERPL-MCNC: 4.7 GM/DL (ref 3.4–5)
ALP BLD-CCNC: 148 IU/L (ref 40–129)
ALT SERPL-CCNC: 39 U/L (ref 10–40)
ANION GAP SERPL CALCULATED.3IONS-SCNC: 16 MMOL/L (ref 4–16)
AST SERPL-CCNC: 61 IU/L (ref 15–37)
BASE EXCESS MIXED: 6.7 (ref 0–1.2)
BASOPHILS ABSOLUTE: 0.1 K/CU MM
BASOPHILS RELATIVE PERCENT: 0.7 % (ref 0–1)
BILIRUB SERPL-MCNC: 0.4 MG/DL (ref 0–1)
BUN BLDV-MCNC: 10 MG/DL (ref 6–23)
CALCIUM SERPL-MCNC: 9.2 MG/DL (ref 8.3–10.6)
CARBON MONOXIDE, BLOOD: 1.5 % (ref 0–5)
CHLORIDE BLD-SCNC: 96 MMOL/L (ref 99–110)
CO2 CONTENT: 35 MMOL/L (ref 19–24)
CO2: 25 MMOL/L (ref 21–32)
CREAT SERPL-MCNC: 0.8 MG/DL (ref 0.9–1.3)
DIFFERENTIAL TYPE: ABNORMAL
EOSINOPHILS ABSOLUTE: 0 K/CU MM
EOSINOPHILS RELATIVE PERCENT: 0 % (ref 0–3)
GFR AFRICAN AMERICAN: >60 ML/MIN/1.73M2
GFR NON-AFRICAN AMERICAN: >60 ML/MIN/1.73M2
GLUCOSE BLD-MCNC: 161 MG/DL (ref 70–99)
HCO3 ARTERIAL: 33.3 MMOL/L (ref 18–23)
HCT VFR BLD CALC: 40.5 % (ref 42–52)
HEMOGLOBIN: 13.2 GM/DL (ref 13.5–18)
IMMATURE NEUTROPHIL %: 0.5 % (ref 0–0.43)
LYMPHOCYTES ABSOLUTE: 1.2 K/CU MM
LYMPHOCYTES RELATIVE PERCENT: 8.8 % (ref 24–44)
MAGNESIUM: 2 MG/DL (ref 1.8–2.4)
MCH RBC QN AUTO: 31.6 PG (ref 27–31)
MCHC RBC AUTO-ENTMCNC: 32.6 % (ref 32–36)
MCV RBC AUTO: 96.9 FL (ref 78–100)
METHEMOGLOBIN ARTERIAL: 1.6 %
MONOCYTES ABSOLUTE: 0.7 K/CU MM
MONOCYTES RELATIVE PERCENT: 5.6 % (ref 0–4)
NUCLEATED RBC %: 0 %
O2 SATURATION: 95.1 % (ref 96–97)
PCO2 ARTERIAL: 55 MMHG (ref 32–45)
PDW BLD-RTO: 14.1 % (ref 11.7–14.9)
PH BLOOD: 7.39 (ref 7.34–7.45)
PHOSPHORUS: 2.1 MG/DL (ref 2.5–4.9)
PLATELET # BLD: 401 K/CU MM (ref 140–440)
PMV BLD AUTO: 9 FL (ref 7.5–11.1)
PO2 ARTERIAL: 100 MMHG (ref 75–100)
POTASSIUM SERPL-SCNC: 4.1 MMOL/L (ref 3.5–5.1)
RBC # BLD: 4.18 M/CU MM (ref 4.6–6.2)
SEGMENTED NEUTROPHILS ABSOLUTE COUNT: 11 K/CU MM
SEGMENTED NEUTROPHILS RELATIVE PERCENT: 84.4 % (ref 36–66)
SODIUM BLD-SCNC: 137 MMOL/L (ref 135–145)
TOTAL IMMATURE NEUTOROPHIL: 0.07 K/CU MM
TOTAL NUCLEATED RBC: 0 K/CU MM
TOTAL PROTEIN: 8 GM/DL (ref 6.4–8.2)
WBC # BLD: 13.1 K/CU MM (ref 4–10.5)

## 2019-03-06 PROCEDURE — 72125 CT NECK SPINE W/O DYE: CPT

## 2019-03-06 PROCEDURE — 83735 ASSAY OF MAGNESIUM: CPT

## 2019-03-06 PROCEDURE — 2000000000 HC ICU R&B

## 2019-03-06 PROCEDURE — 80053 COMPREHEN METABOLIC PANEL: CPT

## 2019-03-06 PROCEDURE — 82803 BLOOD GASES ANY COMBINATION: CPT

## 2019-03-06 PROCEDURE — 93005 ELECTROCARDIOGRAM TRACING: CPT | Performed by: EMERGENCY MEDICINE

## 2019-03-06 PROCEDURE — 6360000002 HC RX W HCPCS

## 2019-03-06 PROCEDURE — 71045 X-RAY EXAM CHEST 1 VIEW: CPT

## 2019-03-06 PROCEDURE — 85025 COMPLETE CBC W/AUTO DIFF WBC: CPT

## 2019-03-06 PROCEDURE — 73030 X-RAY EXAM OF SHOULDER: CPT

## 2019-03-06 PROCEDURE — 87081 CULTURE SCREEN ONLY: CPT

## 2019-03-06 PROCEDURE — 70450 CT HEAD/BRAIN W/O DYE: CPT

## 2019-03-06 PROCEDURE — 2580000003 HC RX 258: Performed by: EMERGENCY MEDICINE

## 2019-03-06 PROCEDURE — 84100 ASSAY OF PHOSPHORUS: CPT

## 2019-03-06 PROCEDURE — 6360000002 HC RX W HCPCS: Performed by: NURSE PRACTITIONER

## 2019-03-06 PROCEDURE — 6360000002 HC RX W HCPCS: Performed by: EMERGENCY MEDICINE

## 2019-03-06 PROCEDURE — 96365 THER/PROPH/DIAG IV INF INIT: CPT

## 2019-03-06 PROCEDURE — 36600 WITHDRAWAL OF ARTERIAL BLOOD: CPT

## 2019-03-06 PROCEDURE — 2580000003 HC RX 258: Performed by: NURSE PRACTITIONER

## 2019-03-06 PROCEDURE — 80177 DRUG SCRN QUAN LEVETIRACETAM: CPT

## 2019-03-06 PROCEDURE — 96375 TX/PRO/DX INJ NEW DRUG ADDON: CPT

## 2019-03-06 PROCEDURE — 99291 CRITICAL CARE FIRST HOUR: CPT

## 2019-03-06 RX ORDER — CLOPIDOGREL BISULFATE 75 MG/1
75 TABLET ORAL DAILY
Status: DISCONTINUED | OUTPATIENT
Start: 2019-03-07 | End: 2019-03-09 | Stop reason: HOSPADM

## 2019-03-06 RX ORDER — FLUOXETINE 10 MG/1
10 CAPSULE ORAL NIGHTLY
Status: DISCONTINUED | OUTPATIENT
Start: 2019-03-06 | End: 2019-03-09 | Stop reason: HOSPADM

## 2019-03-06 RX ORDER — ATORVASTATIN CALCIUM 40 MG/1
40 TABLET, FILM COATED ORAL NIGHTLY
Status: DISCONTINUED | OUTPATIENT
Start: 2019-03-06 | End: 2019-03-09 | Stop reason: HOSPADM

## 2019-03-06 RX ORDER — LORAZEPAM 2 MG/ML
INJECTION INTRAMUSCULAR
Status: COMPLETED
Start: 2019-03-06 | End: 2019-03-06

## 2019-03-06 RX ORDER — MULTIVITAMIN WITH IRON
250 TABLET ORAL DAILY
COMMUNITY
End: 2019-05-13

## 2019-03-06 RX ORDER — LORAZEPAM 2 MG/ML
2 INJECTION INTRAMUSCULAR ONCE
Status: COMPLETED | OUTPATIENT
Start: 2019-03-06 | End: 2019-03-06

## 2019-03-06 RX ORDER — VITAMIN E 268 MG
400 CAPSULE ORAL DAILY
Status: DISCONTINUED | OUTPATIENT
Start: 2019-03-07 | End: 2019-03-09 | Stop reason: HOSPADM

## 2019-03-06 RX ORDER — LANOLIN ALCOHOL/MO/W.PET/CERES
1000 CREAM (GRAM) TOPICAL 2 TIMES DAILY
Status: DISCONTINUED | OUTPATIENT
Start: 2019-03-06 | End: 2019-03-09 | Stop reason: HOSPADM

## 2019-03-06 RX ORDER — LEVETIRACETAM 500 MG/1
1500 TABLET ORAL NIGHTLY
COMMUNITY
End: 2019-04-02

## 2019-03-06 RX ORDER — LORAZEPAM 2 MG/ML
2 INJECTION INTRAMUSCULAR EVERY 4 HOURS PRN
Status: DISCONTINUED | OUTPATIENT
Start: 2019-03-06 | End: 2019-03-09 | Stop reason: HOSPADM

## 2019-03-06 RX ORDER — FOLIC ACID 1 MG/1
500 TABLET ORAL DAILY
Status: DISCONTINUED | OUTPATIENT
Start: 2019-03-07 | End: 2019-03-09 | Stop reason: HOSPADM

## 2019-03-06 RX ORDER — MIDAZOLAM HYDROCHLORIDE 1 MG/ML
5 INJECTION INTRAMUSCULAR; INTRAVENOUS ONCE
Status: COMPLETED | OUTPATIENT
Start: 2019-03-06 | End: 2019-03-06

## 2019-03-06 RX ORDER — ASPIRIN 81 MG/1
81 TABLET, CHEWABLE ORAL NIGHTLY
Status: DISCONTINUED | OUTPATIENT
Start: 2019-03-06 | End: 2019-03-09 | Stop reason: HOSPADM

## 2019-03-06 RX ORDER — MIDAZOLAM HYDROCHLORIDE 1 MG/ML
INJECTION INTRAMUSCULAR; INTRAVENOUS
Status: COMPLETED
Start: 2019-03-06 | End: 2019-03-06

## 2019-03-06 RX ORDER — M-VIT,TX,IRON,MINS/CALC/FOLIC 27MG-0.4MG
1 TABLET ORAL DAILY
Status: DISCONTINUED | OUTPATIENT
Start: 2019-03-07 | End: 2019-03-09 | Stop reason: HOSPADM

## 2019-03-06 RX ORDER — LANOLIN ALCOHOL/MO/W.PET/CERES
100 CREAM (GRAM) TOPICAL DAILY
Status: DISCONTINUED | OUTPATIENT
Start: 2019-03-07 | End: 2019-03-09 | Stop reason: HOSPADM

## 2019-03-06 RX ORDER — DONEPEZIL HYDROCHLORIDE 10 MG/1
10 TABLET, FILM COATED ORAL NIGHTLY
Status: DISCONTINUED | OUTPATIENT
Start: 2019-03-06 | End: 2019-03-09 | Stop reason: HOSPADM

## 2019-03-06 RX ADMIN — LEVETIRACETAM 1000 MG: 100 INJECTION, SOLUTION INTRAVENOUS at 17:05

## 2019-03-06 RX ADMIN — LORAZEPAM 2 MG: 2 INJECTION, SOLUTION INTRAMUSCULAR; INTRAVENOUS at 17:00

## 2019-03-06 RX ADMIN — MIDAZOLAM HYDROCHLORIDE 5 MG: 1 INJECTION, SOLUTION INTRAMUSCULAR; INTRAVENOUS at 17:03

## 2019-03-06 RX ADMIN — MIDAZOLAM HYDROCHLORIDE 5 MG: 1 INJECTION INTRAMUSCULAR; INTRAVENOUS at 17:03

## 2019-03-06 RX ADMIN — LEVETIRACETAM 1000 MG: 100 INJECTION, SOLUTION INTRAVENOUS at 22:30

## 2019-03-06 RX ADMIN — LORAZEPAM 2 MG: 2 INJECTION INTRAMUSCULAR at 17:00

## 2019-03-06 RX ADMIN — LORAZEPAM 2 MG: 2 INJECTION INTRAMUSCULAR; INTRAVENOUS at 16:50

## 2019-03-06 RX ADMIN — LORAZEPAM 2 MG: 2 INJECTION INTRAMUSCULAR at 16:50

## 2019-03-06 ASSESSMENT — PAIN SCALES - GENERAL
PAINLEVEL_OUTOF10: 0
PAINLEVEL_OUTOF10: 0

## 2019-03-07 LAB
ANION GAP SERPL CALCULATED.3IONS-SCNC: 9 MMOL/L (ref 4–16)
BUN BLDV-MCNC: 9 MG/DL (ref 6–23)
CALCIUM SERPL-MCNC: 9.4 MG/DL (ref 8.3–10.6)
CHLORIDE BLD-SCNC: 97 MMOL/L (ref 99–110)
CO2: 32 MMOL/L (ref 21–32)
CREAT SERPL-MCNC: 0.8 MG/DL (ref 0.9–1.3)
GFR AFRICAN AMERICAN: >60 ML/MIN/1.73M2
GFR NON-AFRICAN AMERICAN: >60 ML/MIN/1.73M2
GLUCOSE BLD-MCNC: 86 MG/DL (ref 70–99)
HCT VFR BLD CALC: 38.2 % (ref 42–52)
HEMOGLOBIN: 12.4 GM/DL (ref 13.5–18)
MCH RBC QN AUTO: 31.7 PG (ref 27–31)
MCHC RBC AUTO-ENTMCNC: 32.5 % (ref 32–36)
MCV RBC AUTO: 97.7 FL (ref 78–100)
PDW BLD-RTO: 14.4 % (ref 11.7–14.9)
PLATELET # BLD: 325 K/CU MM (ref 140–440)
PMV BLD AUTO: 9.2 FL (ref 7.5–11.1)
POTASSIUM SERPL-SCNC: 3.9 MMOL/L (ref 3.5–5.1)
RBC # BLD: 3.91 M/CU MM (ref 4.6–6.2)
SODIUM BLD-SCNC: 138 MMOL/L (ref 135–145)
WBC # BLD: 9.4 K/CU MM (ref 4–10.5)

## 2019-03-07 PROCEDURE — 85027 COMPLETE CBC AUTOMATED: CPT

## 2019-03-07 PROCEDURE — 6370000000 HC RX 637 (ALT 250 FOR IP): Performed by: NURSE PRACTITIONER

## 2019-03-07 PROCEDURE — 99223 1ST HOSP IP/OBS HIGH 75: CPT | Performed by: PSYCHIATRY & NEUROLOGY

## 2019-03-07 PROCEDURE — 93010 ELECTROCARDIOGRAM REPORT: CPT | Performed by: INTERNAL MEDICINE

## 2019-03-07 PROCEDURE — 6370000000 HC RX 637 (ALT 250 FOR IP): Performed by: CLINICAL NURSE SPECIALIST

## 2019-03-07 PROCEDURE — 1200000000 HC SEMI PRIVATE

## 2019-03-07 PROCEDURE — 80048 BASIC METABOLIC PNL TOTAL CA: CPT

## 2019-03-07 RX ORDER — ZONISAMIDE 100 MG/1
100 CAPSULE ORAL NIGHTLY
Status: DISCONTINUED | OUTPATIENT
Start: 2019-03-08 | End: 2019-03-09 | Stop reason: HOSPADM

## 2019-03-07 RX ORDER — LEVETIRACETAM 500 MG/1
1500 TABLET ORAL 2 TIMES DAILY
Status: DISCONTINUED | OUTPATIENT
Start: 2019-03-07 | End: 2019-03-09 | Stop reason: HOSPADM

## 2019-03-07 RX ORDER — ZONISAMIDE 100 MG/1
100 CAPSULE ORAL DAILY
Status: DISCONTINUED | OUTPATIENT
Start: 2019-03-07 | End: 2019-03-07

## 2019-03-07 RX ADMIN — LEVETIRACETAM 1500 MG: 500 TABLET, FILM COATED ORAL at 10:05

## 2019-03-07 RX ADMIN — ZONISAMIDE 100 MG: 100 CAPSULE ORAL at 15:30

## 2019-03-07 RX ADMIN — Medication 400 MG: at 10:05

## 2019-03-07 RX ADMIN — Medication 100 MG: at 10:05

## 2019-03-07 RX ADMIN — CLOPIDOGREL BISULFATE 75 MG: 75 TABLET, FILM COATED ORAL at 10:05

## 2019-03-07 RX ADMIN — ATORVASTATIN CALCIUM 40 MG: 40 TABLET, FILM COATED ORAL at 21:41

## 2019-03-07 RX ADMIN — DONEPEZIL HYDROCHLORIDE 10 MG: 10 TABLET ORAL at 21:41

## 2019-03-07 RX ADMIN — LEVETIRACETAM 1500 MG: 500 TABLET, FILM COATED ORAL at 21:40

## 2019-03-07 RX ADMIN — CYANOCOBALAMIN TAB 1000 MCG 1000 MCG: 1000 TAB at 21:41

## 2019-03-07 RX ADMIN — MULTIPLE VITAMINS W/ MINERALS TAB 1 TABLET: TAB at 10:05

## 2019-03-07 RX ADMIN — CYANOCOBALAMIN TAB 1000 MCG 1000 MCG: 1000 TAB at 10:05

## 2019-03-07 RX ADMIN — VITAMIN E CAP 400 UNIT 400 UNITS: 400 CAP at 10:05

## 2019-03-07 RX ADMIN — FLUOXETINE 10 MG: 10 CAPSULE ORAL at 21:41

## 2019-03-07 RX ADMIN — ASPIRIN 81 MG 81 MG: 81 TABLET ORAL at 21:40

## 2019-03-07 RX ADMIN — FOLIC ACID 500 MCG: 1 TABLET ORAL at 10:10

## 2019-03-07 ASSESSMENT — PAIN SCALES - GENERAL
PAINLEVEL_OUTOF10: 0

## 2019-03-08 LAB
AMMONIA: 17 UMOL/L (ref 16–60)
ANION GAP SERPL CALCULATED.3IONS-SCNC: 14 MMOL/L (ref 4–16)
BUN BLDV-MCNC: 11 MG/DL (ref 6–23)
CALCIUM SERPL-MCNC: 8.8 MG/DL (ref 8.3–10.6)
CHLORIDE BLD-SCNC: 100 MMOL/L (ref 99–110)
CO2: 26 MMOL/L (ref 21–32)
CREAT SERPL-MCNC: 0.9 MG/DL (ref 0.9–1.3)
CULTURE: NORMAL
FOLATE: >20 NG/ML (ref 3.1–17.5)
GFR AFRICAN AMERICAN: >60 ML/MIN/1.73M2
GFR NON-AFRICAN AMERICAN: >60 ML/MIN/1.73M2
GLUCOSE BLD-MCNC: 130 MG/DL (ref 70–99)
HCT VFR BLD CALC: 41.6 % (ref 42–52)
HEMOGLOBIN: 12.9 GM/DL (ref 13.5–18)
KEPPRA: 55
Lab: NORMAL
MCH RBC QN AUTO: 31.8 PG (ref 27–31)
MCHC RBC AUTO-ENTMCNC: 31 % (ref 32–36)
MCV RBC AUTO: 102.5 FL (ref 78–100)
PDW BLD-RTO: 14 % (ref 11.7–14.9)
PLATELET # BLD: 348 K/CU MM (ref 140–440)
PMV BLD AUTO: 9.1 FL (ref 7.5–11.1)
POTASSIUM SERPL-SCNC: 3.4 MMOL/L (ref 3.5–5.1)
RBC # BLD: 4.06 M/CU MM (ref 4.6–6.2)
REPORT STATUS: NORMAL
SODIUM BLD-SCNC: 140 MMOL/L (ref 135–145)
SPECIMEN: NORMAL
VITAMIN B-12: >2000 PG/ML (ref 211–911)
WBC # BLD: 9.5 K/CU MM (ref 4–10.5)

## 2019-03-08 PROCEDURE — 82140 ASSAY OF AMMONIA: CPT

## 2019-03-08 PROCEDURE — 6360000002 HC RX W HCPCS: Performed by: HOSPITALIST

## 2019-03-08 PROCEDURE — 1200000000 HC SEMI PRIVATE

## 2019-03-08 PROCEDURE — 51798 US URINE CAPACITY MEASURE: CPT

## 2019-03-08 PROCEDURE — 84425 ASSAY OF VITAMIN B-1: CPT

## 2019-03-08 PROCEDURE — 6370000000 HC RX 637 (ALT 250 FOR IP): Performed by: CLINICAL NURSE SPECIALIST

## 2019-03-08 PROCEDURE — 36415 COLL VENOUS BLD VENIPUNCTURE: CPT

## 2019-03-08 PROCEDURE — 80048 BASIC METABOLIC PNL TOTAL CA: CPT

## 2019-03-08 PROCEDURE — 94761 N-INVAS EAR/PLS OXIMETRY MLT: CPT

## 2019-03-08 PROCEDURE — 85027 COMPLETE CBC AUTOMATED: CPT

## 2019-03-08 PROCEDURE — 82746 ASSAY OF FOLIC ACID SERUM: CPT

## 2019-03-08 PROCEDURE — 82607 VITAMIN B-12: CPT

## 2019-03-08 PROCEDURE — 95819 EEG AWAKE AND ASLEEP: CPT

## 2019-03-08 PROCEDURE — 6370000000 HC RX 637 (ALT 250 FOR IP): Performed by: NURSE PRACTITIONER

## 2019-03-08 PROCEDURE — 6360000002 HC RX W HCPCS: Performed by: NURSE PRACTITIONER

## 2019-03-08 RX ORDER — LORAZEPAM 2 MG/ML
2 INJECTION INTRAMUSCULAR ONCE
Status: COMPLETED | OUTPATIENT
Start: 2019-03-08 | End: 2019-03-08

## 2019-03-08 RX ADMIN — ZONISAMIDE 100 MG: 100 CAPSULE ORAL at 20:12

## 2019-03-08 RX ADMIN — CYANOCOBALAMIN TAB 1000 MCG 1000 MCG: 1000 TAB at 20:12

## 2019-03-08 RX ADMIN — MULTIPLE VITAMINS W/ MINERALS TAB 1 TABLET: TAB at 09:50

## 2019-03-08 RX ADMIN — ATORVASTATIN CALCIUM 40 MG: 40 TABLET, FILM COATED ORAL at 20:12

## 2019-03-08 RX ADMIN — CLOPIDOGREL BISULFATE 75 MG: 75 TABLET, FILM COATED ORAL at 09:49

## 2019-03-08 RX ADMIN — Medication 400 MG: at 09:50

## 2019-03-08 RX ADMIN — LEVETIRACETAM 1500 MG: 500 TABLET, FILM COATED ORAL at 09:49

## 2019-03-08 RX ADMIN — ASPIRIN 81 MG 81 MG: 81 TABLET ORAL at 20:12

## 2019-03-08 RX ADMIN — LEVETIRACETAM 1500 MG: 500 TABLET, FILM COATED ORAL at 20:12

## 2019-03-08 RX ADMIN — LORAZEPAM 2 MG: 2 INJECTION INTRAMUSCULAR; INTRAVENOUS at 12:29

## 2019-03-08 RX ADMIN — FOLIC ACID 500 MCG: 1 TABLET ORAL at 09:49

## 2019-03-08 RX ADMIN — VITAMIN E CAP 400 UNIT 400 UNITS: 400 CAP at 09:49

## 2019-03-08 RX ADMIN — LORAZEPAM 2 MG: 2 INJECTION INTRAMUSCULAR; INTRAVENOUS at 13:39

## 2019-03-08 RX ADMIN — LORAZEPAM 2 MG: 2 INJECTION INTRAMUSCULAR; INTRAVENOUS at 22:08

## 2019-03-08 RX ADMIN — Medication 100 MG: at 09:49

## 2019-03-08 RX ADMIN — DONEPEZIL HYDROCHLORIDE 10 MG: 10 TABLET ORAL at 20:12

## 2019-03-08 RX ADMIN — FLUOXETINE 10 MG: 10 CAPSULE ORAL at 20:12

## 2019-03-08 RX ADMIN — CYANOCOBALAMIN TAB 1000 MCG 1000 MCG: 1000 TAB at 09:50

## 2019-03-08 ASSESSMENT — PAIN SCALES - GENERAL
PAINLEVEL_OUTOF10: 0

## 2019-03-09 ENCOUNTER — APPOINTMENT (OUTPATIENT)
Dept: MRI IMAGING | Age: 59
DRG: 053 | End: 2019-03-09
Payer: MEDICARE

## 2019-03-09 VITALS
HEART RATE: 89 BPM | BODY MASS INDEX: 19.8 KG/M2 | WEIGHT: 115.96 LBS | DIASTOLIC BLOOD PRESSURE: 95 MMHG | OXYGEN SATURATION: 99 % | SYSTOLIC BLOOD PRESSURE: 189 MMHG | TEMPERATURE: 98.4 F | HEIGHT: 64 IN | RESPIRATION RATE: 18 BRPM

## 2019-03-09 PROCEDURE — 99233 SBSQ HOSP IP/OBS HIGH 50: CPT | Performed by: CLINICAL NURSE SPECIALIST

## 2019-03-09 PROCEDURE — 6370000000 HC RX 637 (ALT 250 FOR IP): Performed by: NURSE PRACTITIONER

## 2019-03-09 PROCEDURE — 6360000002 HC RX W HCPCS: Performed by: NURSE PRACTITIONER

## 2019-03-09 PROCEDURE — 70551 MRI BRAIN STEM W/O DYE: CPT

## 2019-03-09 PROCEDURE — 51798 US URINE CAPACITY MEASURE: CPT

## 2019-03-09 PROCEDURE — 84425 ASSAY OF VITAMIN B-1: CPT

## 2019-03-09 PROCEDURE — 6370000000 HC RX 637 (ALT 250 FOR IP): Performed by: CLINICAL NURSE SPECIALIST

## 2019-03-09 RX ORDER — LORAZEPAM 2 MG/ML
2 INJECTION INTRAMUSCULAR ONCE
Status: DISCONTINUED | OUTPATIENT
Start: 2019-03-09 | End: 2019-03-09 | Stop reason: HOSPADM

## 2019-03-09 RX ADMIN — LORAZEPAM 2 MG: 2 INJECTION INTRAMUSCULAR; INTRAVENOUS at 16:25

## 2019-03-09 RX ADMIN — VITAMIN E CAP 400 UNIT 400 UNITS: 400 CAP at 08:51

## 2019-03-09 RX ADMIN — CLOPIDOGREL BISULFATE 75 MG: 75 TABLET, FILM COATED ORAL at 08:50

## 2019-03-09 RX ADMIN — FOLIC ACID 1000 MCG: 1 TABLET ORAL at 08:50

## 2019-03-09 RX ADMIN — MULTIPLE VITAMINS W/ MINERALS TAB 1 TABLET: TAB at 08:50

## 2019-03-09 RX ADMIN — Medication 100 MG: at 08:49

## 2019-03-09 RX ADMIN — LEVETIRACETAM 1500 MG: 500 TABLET, FILM COATED ORAL at 08:51

## 2019-03-09 RX ADMIN — LORAZEPAM 2 MG: 2 INJECTION INTRAMUSCULAR; INTRAVENOUS at 00:55

## 2019-03-09 RX ADMIN — CYANOCOBALAMIN TAB 1000 MCG 1000 MCG: 1000 TAB at 08:49

## 2019-03-09 RX ADMIN — Medication 400 MG: at 08:49

## 2019-03-09 ASSESSMENT — PAIN SCALES - GENERAL
PAINLEVEL_OUTOF10: 0
PAINLEVEL_OUTOF10: 0

## 2019-03-11 ENCOUNTER — TELEPHONE (OUTPATIENT)
Dept: FAMILY MEDICINE CLINIC | Age: 59
End: 2019-03-11

## 2019-03-11 DIAGNOSIS — G40.909 SEIZURE DISORDER (HCC): Primary | ICD-10-CM

## 2019-03-12 ENCOUNTER — TELEPHONE (OUTPATIENT)
Dept: FAMILY MEDICINE CLINIC | Age: 59
End: 2019-03-12

## 2019-03-13 LAB
VITAMIN B1, PLASMA: 141 NMOL/L (ref 70–180)
VITAMIN B1, PLASMA: NORMAL NMOL/L (ref 70–180)

## 2019-03-19 LAB
EKG ATRIAL RATE: 116 BPM
EKG DIAGNOSIS: NORMAL
EKG P AXIS: 35 DEGREES
EKG P-R INTERVAL: 136 MS
EKG Q-T INTERVAL: 374 MS
EKG QRS DURATION: 126 MS
EKG QTC CALCULATION (BAZETT): 519 MS
EKG R AXIS: 20 DEGREES
EKG T AXIS: 8 DEGREES
EKG VENTRICULAR RATE: 116 BPM

## 2019-04-02 ENCOUNTER — OFFICE VISIT (OUTPATIENT)
Dept: FAMILY MEDICINE CLINIC | Age: 59
End: 2019-04-02
Payer: MEDICARE

## 2019-04-02 VITALS
TEMPERATURE: 98.2 F | OXYGEN SATURATION: 98 % | DIASTOLIC BLOOD PRESSURE: 72 MMHG | WEIGHT: 129 LBS | HEART RATE: 83 BPM | BODY MASS INDEX: 22.14 KG/M2 | SYSTOLIC BLOOD PRESSURE: 134 MMHG

## 2019-04-02 DIAGNOSIS — G40.909 SEIZURE DISORDER (HCC): ICD-10-CM

## 2019-04-02 DIAGNOSIS — G40.919 BREAKTHROUGH SEIZURE (HCC): Primary | ICD-10-CM

## 2019-04-02 DIAGNOSIS — M62.838 TRAPEZIUS MUSCLE SPASM: ICD-10-CM

## 2019-04-02 PROCEDURE — 1036F TOBACCO NON-USER: CPT | Performed by: FAMILY MEDICINE

## 2019-04-02 PROCEDURE — 3017F COLORECTAL CA SCREEN DOC REV: CPT | Performed by: FAMILY MEDICINE

## 2019-04-02 PROCEDURE — 99214 OFFICE O/P EST MOD 30 MIN: CPT | Performed by: FAMILY MEDICINE

## 2019-04-02 PROCEDURE — 1111F DSCHRG MED/CURRENT MED MERGE: CPT | Performed by: FAMILY MEDICINE

## 2019-04-02 PROCEDURE — G8598 ASA/ANTIPLAT THER USED: HCPCS | Performed by: FAMILY MEDICINE

## 2019-04-02 PROCEDURE — G8427 DOCREV CUR MEDS BY ELIG CLIN: HCPCS | Performed by: FAMILY MEDICINE

## 2019-04-02 PROCEDURE — G8420 CALC BMI NORM PARAMETERS: HCPCS | Performed by: FAMILY MEDICINE

## 2019-04-02 RX ORDER — TIZANIDINE 4 MG/1
4 TABLET ORAL NIGHTLY
Qty: 30 TABLET | Refills: 0 | Status: SHIPPED | OUTPATIENT
Start: 2019-04-02 | End: 2019-04-02

## 2019-04-02 RX ORDER — DIVALPROEX SODIUM 500 MG/1
500 TABLET, DELAYED RELEASE ORAL 2 TIMES DAILY
COMMUNITY
End: 2022-09-23 | Stop reason: SDUPTHER

## 2019-04-02 RX ORDER — LACOSAMIDE 200 MG/1
200 TABLET ORAL
COMMUNITY
Start: 2019-03-17 | End: 2022-09-23

## 2019-04-02 ASSESSMENT — ENCOUNTER SYMPTOMS: SHORTNESS OF BREATH: 0

## 2019-04-02 NOTE — PROGRESS NOTES
(PROZAC) 10 mg capsule   Take 10 mg by mouth at bedtime.   0 03/09/2019     levETIRAcetam (KEPPRA) 750 mg tablet   Take 1,500 mg by mouth two times a day.   0 03/09/2019           Medications marked \"taking\" at this time  Outpatient Medications Marked as Taking for the 4/2/19 encounter (Office Visit) with Mable Niño MD   Medication Sig Dispense Refill    lacosamide (VIMPAT) 200 MG tablet Take 200 mg by mouth.  divalproex (DEPAKOTE) 500 MG DR tablet Take 500 mg by mouth 2 times daily      atorvastatin (LIPITOR) 40 MG tablet TAKE ONE TABLET BY MOUTH ONCE NIGHTLY 90 tablet 0    magnesium (MAGNESIUM-OXIDE) 250 MG TABS tablet Take 250 mg by mouth daily      clopidogrel (PLAVIX) 75 MG tablet Take 1 tablet by mouth daily 90 tablet 0    FLUoxetine (PROZAC) 10 MG capsule Take 1 capsule by mouth daily (Patient taking differently: Take 10 mg by mouth nightly ) 90 capsule 1    Vitamin E 400 units TABS Take 400 Units by mouth daily       donepezil (ARICEPT) 10 MG tablet Take 10 mg by mouth nightly       pyridoxine (B-6) 100 MG tablet Take 100 mg by mouth daily       Cyanocobalamin (B-12) 1000 MCG TABS Take 1,000 mcg by mouth 2 times daily       folic acid (FOLVITE) 227 MCG tablet Take 400 mcg by mouth daily       Omega-3 Fatty Acids (FISH OIL PO) Take 1 tablet by mouth daily      Multiple Vitamins-Minerals (THERAPEUTIC MULTIVITAMIN-MINERALS) tablet Take 1 tablet by mouth daily 07/24/18 Takes OTC      aspirin 81 MG chewable tablet Take 1 tablet by mouth daily (Patient taking differently: Take 81 mg by mouth nightly ) 30 tablet 3        Medications patient taking as of now reconciled against medications ordered at time of hospital discharge: Yes    Chief Complaint   Patient presents with    Follow-Up from Hospital       HPI    Inpatient course: Discharge summary reviewed- see chart. Interval history/Current status: Since discharge from [facility], he's not had any seizure activity.   He is

## 2019-04-15 ENCOUNTER — TELEPHONE (OUTPATIENT)
Dept: FAMILY MEDICINE CLINIC | Age: 59
End: 2019-04-15

## 2019-04-15 NOTE — TELEPHONE ENCOUNTER
Pt called in regards to zanaflex for back pain. States med has helped him sleep at night but by the day time he is unable to function due to pain. Didn't know if he could take BID or if you had another suggestion for what he should do next.

## 2019-05-13 ENCOUNTER — OFFICE VISIT (OUTPATIENT)
Dept: FAMILY MEDICINE CLINIC | Age: 59
End: 2019-05-13
Payer: MEDICARE

## 2019-05-13 VITALS
SYSTOLIC BLOOD PRESSURE: 126 MMHG | HEART RATE: 82 BPM | BODY MASS INDEX: 22.66 KG/M2 | DIASTOLIC BLOOD PRESSURE: 78 MMHG | OXYGEN SATURATION: 98 % | WEIGHT: 132 LBS

## 2019-05-13 DIAGNOSIS — M62.838 TRAPEZIUS MUSCLE SPASM: ICD-10-CM

## 2019-05-13 DIAGNOSIS — I63.311 CEREBROVASCULAR ACCIDENT (CVA) DUE TO THROMBOSIS OF RIGHT MIDDLE CEREBRAL ARTERY (HCC): Primary | ICD-10-CM

## 2019-05-13 DIAGNOSIS — G40.909 SEIZURE DISORDER (HCC): ICD-10-CM

## 2019-05-13 PROCEDURE — 99214 OFFICE O/P EST MOD 30 MIN: CPT | Performed by: FAMILY MEDICINE

## 2019-05-13 PROCEDURE — G8427 DOCREV CUR MEDS BY ELIG CLIN: HCPCS | Performed by: FAMILY MEDICINE

## 2019-05-13 PROCEDURE — G8420 CALC BMI NORM PARAMETERS: HCPCS | Performed by: FAMILY MEDICINE

## 2019-05-13 PROCEDURE — 3017F COLORECTAL CA SCREEN DOC REV: CPT | Performed by: FAMILY MEDICINE

## 2019-05-13 PROCEDURE — 1036F TOBACCO NON-USER: CPT | Performed by: FAMILY MEDICINE

## 2019-05-13 PROCEDURE — G8598 ASA/ANTIPLAT THER USED: HCPCS | Performed by: FAMILY MEDICINE

## 2019-05-13 RX ORDER — TIZANIDINE 4 MG/1
4 TABLET ORAL 2 TIMES DAILY PRN
Qty: 60 TABLET | Refills: 2 | Status: SHIPPED | OUTPATIENT
Start: 2019-05-13 | End: 2019-11-13 | Stop reason: SDUPTHER

## 2019-05-13 RX ORDER — ATORVASTATIN CALCIUM 40 MG/1
40 TABLET, FILM COATED ORAL DAILY
Qty: 90 TABLET | Refills: 1 | Status: SHIPPED | OUTPATIENT
Start: 2019-05-13 | End: 2019-11-13 | Stop reason: SDUPTHER

## 2019-05-13 RX ORDER — CLOPIDOGREL BISULFATE 75 MG/1
75 TABLET ORAL DAILY
Qty: 90 TABLET | Refills: 1 | Status: SHIPPED | OUTPATIENT
Start: 2019-05-13 | End: 2019-11-13 | Stop reason: SDUPTHER

## 2019-05-13 ASSESSMENT — ENCOUNTER SYMPTOMS: SHORTNESS OF BREATH: 0

## 2019-05-20 ENCOUNTER — TELEPHONE (OUTPATIENT)
Dept: FAMILY MEDICINE CLINIC | Age: 59
End: 2019-05-20

## 2019-11-13 ENCOUNTER — OFFICE VISIT (OUTPATIENT)
Dept: FAMILY MEDICINE CLINIC | Age: 59
End: 2019-11-13
Payer: MEDICARE

## 2019-11-13 VITALS
OXYGEN SATURATION: 95 % | WEIGHT: 138.4 LBS | BODY MASS INDEX: 23.76 KG/M2 | TEMPERATURE: 98.4 F | DIASTOLIC BLOOD PRESSURE: 88 MMHG | HEART RATE: 74 BPM | SYSTOLIC BLOOD PRESSURE: 140 MMHG

## 2019-11-13 DIAGNOSIS — F10.11 HISTORY OF ALCOHOL ABUSE: ICD-10-CM

## 2019-11-13 DIAGNOSIS — I63.311 CEREBROVASCULAR ACCIDENT (CVA) DUE TO THROMBOSIS OF RIGHT MIDDLE CEREBRAL ARTERY (HCC): Primary | ICD-10-CM

## 2019-11-13 DIAGNOSIS — R45.86 MOOD CHANGES: ICD-10-CM

## 2019-11-13 DIAGNOSIS — G40.909 SEIZURE DISORDER (HCC): ICD-10-CM

## 2019-11-13 DIAGNOSIS — G89.29 CHRONIC MIDLINE THORACIC BACK PAIN: ICD-10-CM

## 2019-11-13 DIAGNOSIS — M54.6 CHRONIC MIDLINE THORACIC BACK PAIN: ICD-10-CM

## 2019-11-13 DIAGNOSIS — M62.838 TRAPEZIUS MUSCLE SPASM: ICD-10-CM

## 2019-11-13 PROCEDURE — G8598 ASA/ANTIPLAT THER USED: HCPCS | Performed by: FAMILY MEDICINE

## 2019-11-13 PROCEDURE — 1036F TOBACCO NON-USER: CPT | Performed by: FAMILY MEDICINE

## 2019-11-13 PROCEDURE — G8484 FLU IMMUNIZE NO ADMIN: HCPCS | Performed by: FAMILY MEDICINE

## 2019-11-13 PROCEDURE — G8420 CALC BMI NORM PARAMETERS: HCPCS | Performed by: FAMILY MEDICINE

## 2019-11-13 PROCEDURE — G8427 DOCREV CUR MEDS BY ELIG CLIN: HCPCS | Performed by: FAMILY MEDICINE

## 2019-11-13 PROCEDURE — 3017F COLORECTAL CA SCREEN DOC REV: CPT | Performed by: FAMILY MEDICINE

## 2019-11-13 PROCEDURE — 99214 OFFICE O/P EST MOD 30 MIN: CPT | Performed by: FAMILY MEDICINE

## 2019-11-13 RX ORDER — DONEPEZIL HYDROCHLORIDE 10 MG/1
10 TABLET, FILM COATED ORAL
COMMUNITY
Start: 2019-10-08

## 2019-11-13 RX ORDER — FLUOXETINE 10 MG/1
10 CAPSULE ORAL DAILY
Qty: 90 CAPSULE | Refills: 1 | Status: SHIPPED | OUTPATIENT
Start: 2019-11-13 | End: 2020-05-27 | Stop reason: SDUPTHER

## 2019-11-13 RX ORDER — CLOPIDOGREL BISULFATE 75 MG/1
75 TABLET ORAL DAILY
Qty: 90 TABLET | Refills: 1 | Status: SHIPPED | OUTPATIENT
Start: 2019-11-13 | End: 2020-05-11

## 2019-11-13 RX ORDER — TIZANIDINE 4 MG/1
4 TABLET ORAL 2 TIMES DAILY PRN
Qty: 180 TABLET | Refills: 1 | Status: SHIPPED | OUTPATIENT
Start: 2019-11-13 | End: 2020-05-27 | Stop reason: SDUPTHER

## 2019-11-13 RX ORDER — ATORVASTATIN CALCIUM 40 MG/1
40 TABLET, FILM COATED ORAL DAILY
Qty: 90 TABLET | Refills: 1 | Status: SHIPPED | OUTPATIENT
Start: 2019-11-13 | End: 2020-05-27 | Stop reason: SDUPTHER

## 2019-11-13 ASSESSMENT — ENCOUNTER SYMPTOMS: SHORTNESS OF BREATH: 0

## 2019-12-30 ENCOUNTER — OFFICE VISIT (OUTPATIENT)
Dept: FAMILY MEDICINE CLINIC | Age: 59
End: 2019-12-30
Payer: MEDICARE

## 2019-12-30 VITALS
OXYGEN SATURATION: 97 % | SYSTOLIC BLOOD PRESSURE: 122 MMHG | BODY MASS INDEX: 23 KG/M2 | HEART RATE: 72 BPM | TEMPERATURE: 95.9 F | WEIGHT: 134 LBS | DIASTOLIC BLOOD PRESSURE: 80 MMHG

## 2019-12-30 DIAGNOSIS — G89.29 CHRONIC MIDLINE THORACIC BACK PAIN: ICD-10-CM

## 2019-12-30 DIAGNOSIS — M25.511 RIGHT SHOULDER PAIN, UNSPECIFIED CHRONICITY: ICD-10-CM

## 2019-12-30 DIAGNOSIS — M40.204 KYPHOSIS OF THORACIC REGION, UNSPECIFIED KYPHOSIS TYPE: ICD-10-CM

## 2019-12-30 DIAGNOSIS — M54.6 CHRONIC MIDLINE THORACIC BACK PAIN: ICD-10-CM

## 2019-12-30 DIAGNOSIS — I63.311 CEREBROVASCULAR ACCIDENT (CVA) DUE TO THROMBOSIS OF RIGHT MIDDLE CEREBRAL ARTERY (HCC): ICD-10-CM

## 2019-12-30 DIAGNOSIS — R56.9 SEIZURE (HCC): Primary | ICD-10-CM

## 2019-12-30 PROCEDURE — 3017F COLORECTAL CA SCREEN DOC REV: CPT | Performed by: FAMILY MEDICINE

## 2019-12-30 PROCEDURE — G8484 FLU IMMUNIZE NO ADMIN: HCPCS | Performed by: FAMILY MEDICINE

## 2019-12-30 PROCEDURE — G8598 ASA/ANTIPLAT THER USED: HCPCS | Performed by: FAMILY MEDICINE

## 2019-12-30 PROCEDURE — 99214 OFFICE O/P EST MOD 30 MIN: CPT | Performed by: FAMILY MEDICINE

## 2019-12-30 PROCEDURE — G8427 DOCREV CUR MEDS BY ELIG CLIN: HCPCS | Performed by: FAMILY MEDICINE

## 2019-12-30 PROCEDURE — 1036F TOBACCO NON-USER: CPT | Performed by: FAMILY MEDICINE

## 2019-12-30 PROCEDURE — G8420 CALC BMI NORM PARAMETERS: HCPCS | Performed by: FAMILY MEDICINE

## 2019-12-30 ASSESSMENT — ENCOUNTER SYMPTOMS: SHORTNESS OF BREATH: 0

## 2020-01-13 NOTE — LETTER
[address] [facility]  [address] [address]  Phone: [phone]  Fax: [phone]    RE: Lizeth Lance  : 1960      2020      To Whom It May Concern:    Mr. Lizeth Lance is a 63year old patient of mine. Rochelle Henriquez has a history of a stroke with seizure onset after the stroke. He also has some hemiplegia of the left side after his stroke, which has persisted with weakness of the left side. His stroke care and seizures are managed by his neurologist. These appear controlled, but must be taken into consideration when evaluating his ability to work. He does have some cognitive slowness that is noticed with conversation. His residual weakness from his stroke does impact his ability to lift, carry, reach. In addition to his above mention problems, he also suffers from chronic thoracic back pain, kyphosis of the thoracic back and right shoulder pain. Theses musculoskeletal issues reduce his ability to do physical work significantly. He can not stand/sit/walk for long periods. Position changes and positions like bending, crawling, climbing are reduced. In a job setting, Rochelle Henriquez would have a hard time doing significant physical work. He would have a hard time keeping up with any paced work. His cognitive slowness, potential for seizures and side effects from his meds affect his mental processing required for both physical and non physical jobs. Thank you for your attention to this matter.       Sincerely,        Chandrika Self MD

## 2020-05-07 ENCOUNTER — TELEPHONE (OUTPATIENT)
Dept: FAMILY MEDICINE CLINIC | Age: 60
End: 2020-05-07

## 2020-05-07 NOTE — TELEPHONE ENCOUNTER
Patient brother called state patient had stroke in the past and is doing spech therapy and is doing ok but wanted to know if you send a referral to a Neuropsychologist.  Please advise

## 2020-05-27 ENCOUNTER — VIRTUAL VISIT (OUTPATIENT)
Dept: FAMILY MEDICINE CLINIC | Age: 60
End: 2020-05-27
Payer: MEDICARE

## 2020-05-27 VITALS — RESPIRATION RATE: 16 BRPM | SYSTOLIC BLOOD PRESSURE: 120 MMHG | DIASTOLIC BLOOD PRESSURE: 76 MMHG | HEART RATE: 66 BPM

## 2020-05-27 PROCEDURE — G8428 CUR MEDS NOT DOCUMENT: HCPCS | Performed by: FAMILY MEDICINE

## 2020-05-27 PROCEDURE — 99214 OFFICE O/P EST MOD 30 MIN: CPT | Performed by: FAMILY MEDICINE

## 2020-05-27 PROCEDURE — 3017F COLORECTAL CA SCREEN DOC REV: CPT | Performed by: FAMILY MEDICINE

## 2020-05-27 RX ORDER — TIZANIDINE 4 MG/1
4 TABLET ORAL 2 TIMES DAILY PRN
Qty: 180 TABLET | Refills: 1 | Status: SHIPPED | OUTPATIENT
Start: 2020-05-27

## 2020-05-27 RX ORDER — CLOPIDOGREL BISULFATE 75 MG/1
75 TABLET ORAL DAILY
Qty: 90 TABLET | Refills: 1 | Status: SHIPPED | OUTPATIENT
Start: 2020-05-27

## 2020-05-27 RX ORDER — FLUOXETINE 10 MG/1
10 CAPSULE ORAL DAILY
Qty: 90 CAPSULE | Refills: 1 | Status: SHIPPED | OUTPATIENT
Start: 2020-05-27 | End: 2022-09-23

## 2020-05-27 RX ORDER — ATORVASTATIN CALCIUM 40 MG/1
40 TABLET, FILM COATED ORAL DAILY
Qty: 90 TABLET | Refills: 1 | Status: SHIPPED | OUTPATIENT
Start: 2020-05-27

## 2020-05-27 ASSESSMENT — ENCOUNTER SYMPTOMS: SHORTNESS OF BREATH: 0

## 2020-05-27 NOTE — PROGRESS NOTES
also been advised to contact this office for worsening conditions or problems, and seek emergency medical treatment and/or call 911 if deemed necessary. Patient identification was verified at the start of the visit: Yes    Total time spent on this encounter: Not billed by time    Services were provided through a video synchronous discussion virtually to substitute for in-person clinic visit. Patient and provider were located at their individual homes. --Sanford Padilla MD on 5/27/2020 at 1:19 PM    An electronic signature was used to authenticate this note.

## 2020-06-23 LAB
ALBUMIN SERPL-MCNC: 4.4 G/DL
ALP BLD-CCNC: 117 U/L
ALT SERPL-CCNC: 23 U/L
ANION GAP SERPL CALCULATED.3IONS-SCNC: 1.6 MMOL/L
AST SERPL-CCNC: 18 U/L
BASOPHILS ABSOLUTE: 0.1 /ΜL
BASOPHILS RELATIVE PERCENT: 0.7 %
BILIRUB SERPL-MCNC: 0.5 MG/DL (ref 0.1–1.4)
BUN BLDV-MCNC: 10 MG/DL
CALCIUM SERPL-MCNC: 9.3 MG/DL
CHLORIDE BLD-SCNC: 97 MMOL/L
CO2: 28 MMOL/L
CREAT SERPL-MCNC: 0.8 MG/DL
EOSINOPHILS ABSOLUTE: 0 /ΜL
EOSINOPHILS RELATIVE PERCENT: 0.3 %
GFR CALCULATED: 98
GLUCOSE BLD-MCNC: 134 MG/DL
HCT VFR BLD CALC: 38.1 % (ref 41–53)
HEMOGLOBIN: 13.1 G/DL (ref 13.5–17.5)
LYMPHOCYTES ABSOLUTE: 2.3 /ΜL
LYMPHOCYTES RELATIVE PERCENT: 24.4 %
MCH RBC QN AUTO: 32 PG
MCHC RBC AUTO-ENTMCNC: 34.4 G/DL
MCV RBC AUTO: 93 FL
MONOCYTES ABSOLUTE: 0.9 /ΜL
MONOCYTES RELATIVE PERCENT: 9.8 %
NEUTROPHILS ABSOLUTE: 6.1 /ΜL
NEUTROPHILS RELATIVE PERCENT: 64.8 %
PDW BLD-RTO: ABNORMAL %
PLATELET # BLD: 310 K/ΜL
PMV BLD AUTO: ABNORMAL FL
POTASSIUM SERPL-SCNC: 3.9 MMOL/L
RBC # BLD: 4.1 10^6/ΜL
SODIUM BLD-SCNC: 140 MMOL/L
TOTAL PROTEIN: 7.1
WBC # BLD: 9.5 10^3/ML

## 2020-06-26 ENCOUNTER — TELEPHONE (OUTPATIENT)
Dept: FAMILY MEDICINE CLINIC | Age: 60
End: 2020-06-26

## 2020-10-13 ENCOUNTER — OFFICE VISIT (OUTPATIENT)
Dept: FAMILY MEDICINE CLINIC | Age: 60
End: 2020-10-13
Payer: MEDICARE

## 2020-10-13 VITALS
WEIGHT: 121 LBS | HEIGHT: 63 IN | HEART RATE: 82 BPM | DIASTOLIC BLOOD PRESSURE: 70 MMHG | BODY MASS INDEX: 21.44 KG/M2 | OXYGEN SATURATION: 97 % | TEMPERATURE: 97.1 F | SYSTOLIC BLOOD PRESSURE: 128 MMHG

## 2020-10-13 PROCEDURE — 3017F COLORECTAL CA SCREEN DOC REV: CPT | Performed by: NURSE PRACTITIONER

## 2020-10-13 PROCEDURE — G8427 DOCREV CUR MEDS BY ELIG CLIN: HCPCS | Performed by: NURSE PRACTITIONER

## 2020-10-13 PROCEDURE — G8420 CALC BMI NORM PARAMETERS: HCPCS | Performed by: NURSE PRACTITIONER

## 2020-10-13 PROCEDURE — 99213 OFFICE O/P EST LOW 20 MIN: CPT | Performed by: NURSE PRACTITIONER

## 2020-10-13 PROCEDURE — 1036F TOBACCO NON-USER: CPT | Performed by: NURSE PRACTITIONER

## 2020-10-13 PROCEDURE — G8484 FLU IMMUNIZE NO ADMIN: HCPCS | Performed by: NURSE PRACTITIONER

## 2020-10-13 RX ORDER — AMOXICILLIN AND CLAVULANATE POTASSIUM 875; 125 MG/1; MG/1
1 TABLET, FILM COATED ORAL 2 TIMES DAILY
Qty: 14 TABLET | Refills: 0 | Status: SHIPPED | OUTPATIENT
Start: 2020-10-13 | End: 2020-10-20

## 2020-10-13 RX ORDER — PREDNISONE 10 MG/1
TABLET ORAL
Qty: 20 TABLET | Refills: 0 | Status: SHIPPED | OUTPATIENT
Start: 2020-10-13 | End: 2022-09-23

## 2020-10-13 ASSESSMENT — ENCOUNTER SYMPTOMS
SHORTNESS OF BREATH: 0
COLOR CHANGE: 0
WHEEZING: 0
CONSTIPATION: 0
DIARRHEA: 0
CHEST TIGHTNESS: 0
SORE THROAT: 0
ABDOMINAL DISTENTION: 0
COUGH: 0
ABDOMINAL PAIN: 0
SINUS PAIN: 0
SINUS PRESSURE: 0
NAUSEA: 0
EYES NEGATIVE: 1

## 2020-10-13 ASSESSMENT — PATIENT HEALTH QUESTIONNAIRE - PHQ9
SUM OF ALL RESPONSES TO PHQ QUESTIONS 1-9: 0
2. FEELING DOWN, DEPRESSED OR HOPELESS: 0
SUM OF ALL RESPONSES TO PHQ QUESTIONS 1-9: 0
1. LITTLE INTEREST OR PLEASURE IN DOING THINGS: 0
SUM OF ALL RESPONSES TO PHQ9 QUESTIONS 1 & 2: 0

## 2020-10-13 NOTE — PROGRESS NOTES
Diony Ramey  1960  10/13/20    Chief Complaint   Patient presents with    Edema     right hand in morning        SUBJECTIVE:      Mr Sabine Duvall presents to the office this morning with his brother for c/o intermittent right hand pain and swelling over the last few weeks. In brief, they report that this issue has happened in the past, however, recently returned in the last 2 weeks. The pain and swelling is worse in the morning and does improve gradually throughout the day. States it does feel slightly warm to the touch and has been red at time. His cat has also recently bit him. Denies any swelling elsewhere or otherwise in the RUE. Review of Systems   Constitutional: Negative for activity change, appetite change, fatigue and fever. HENT: Negative for congestion, sinus pressure, sinus pain and sore throat. Eyes: Negative. Respiratory: Negative for cough, chest tightness, shortness of breath and wheezing. Cardiovascular: Negative for chest pain and palpitations. Gastrointestinal: Negative for abdominal distention, abdominal pain, constipation, diarrhea and nausea. Genitourinary: Negative for difficulty urinating, dysuria, flank pain, frequency and hematuria. Musculoskeletal: Positive for arthralgias and joint swelling. Negative for gait problem and myalgias. Skin: Negative for color change and rash. Neurological: Negative for dizziness, light-headedness and numbness. Hematological: Negative. Psychiatric/Behavioral: Negative. OBJECTIVE:    /70 (Site: Left Upper Arm, Position: Sitting, Cuff Size: Medium Adult)   Pulse 82   Temp 97.1 °F (36.2 °C) (Temporal)   Ht 5' 2.5\" (1.588 m)   Wt 121 lb (54.9 kg)   SpO2 97%   BMI 21.78 kg/m²     Physical Exam  Constitutional:       General: He is not in acute distress. Appearance: He is well-developed. He is not diaphoretic. HENT:      Head: Normocephalic and atraumatic. Neck:      Thyroid: No thyromegaly. Cardiovascular:      Rate and Rhythm: Normal rate and regular rhythm. Heart sounds: Normal heart sounds. Pulmonary:      Effort: Pulmonary effort is normal.      Breath sounds: Normal breath sounds. Abdominal:      General: Bowel sounds are normal.      Palpations: Abdomen is soft. Musculoskeletal: Normal range of motion. Right wrist: He exhibits swelling. Arms:    Lymphadenopathy:      Cervical: No cervical adenopathy. Skin:     General: Skin is warm and dry. Capillary Refill: Capillary refill takes less than 2 seconds. Findings: Erythema present. No rash. Neurological:      Mental Status: He is alert and oriented to person, place, and time. GCS: GCS eye subscore is 4. GCS verbal subscore is 5. GCS motor subscore is 6. Coordination: Coordination normal.         ASSESSMENT:    1. Intermittent pain and swelling of hand        PLAN:    Williams Rouse was seen today for edema. Diagnoses and all orders for this visit:    Intermittent pain and swelling of hand- overall, my greatest diff dx consider include potentially cat bite cellulitis and inflammatory arthritis; I am leaning more towards the latter given the swelling is worse in mornings and improves throughout the day, however, given recent cat bite I cannot r/o underlying infection for possible retained tooth. Will obtain labs as below as well as plain film of right hand. Will start on Augmentin for empiric coverage and steroids for swelling/pain control. Future plan to be determined once testing available. -     XR HAND RIGHT (MIN 3 VIEWS); Future  -     predniSONE (DELTASONE) 10 MG tablet; Take 4 tablets daily for 2 days, then 3 tablets for 2 days, 2 tablets for 2 days, then 1 tablet for 2 days  -     SEDIMENTATION RATE; Future  -     C-REACTIVE PROTEIN; Future  -     DARLINE Reflex to Antibody Cascade; Future  -     RHEUMATOID FACTOR;  Future  -     CBC Auto Differential; Future  -     amoxicillin-clavulanate (AUGMENTIN) 875-125 MG per tablet; Take 1 tablet by mouth 2 times daily for 7 days    Course of treatment, including any medications, possible imaging, referrals, and follow ups discussed with patient. All risks and benefits and possible side effects discussed with patient who agrees to plan of care and verbalizes understanding. All labs and imaging reviewed. No flowsheet data found. Return if symptoms worsen or fail to improve.

## 2020-10-17 ENCOUNTER — TELEPHONE (OUTPATIENT)
Dept: CARDIOLOGY CLINIC | Age: 60
End: 2020-10-17

## 2020-10-17 ENCOUNTER — VIRTUAL VISIT (OUTPATIENT)
Dept: FAMILY MEDICINE CLINIC | Age: 60
End: 2020-10-17
Payer: MEDICARE

## 2020-10-17 PROCEDURE — 1036F TOBACCO NON-USER: CPT | Performed by: NURSE PRACTITIONER

## 2020-10-17 PROCEDURE — 3017F COLORECTAL CA SCREEN DOC REV: CPT | Performed by: NURSE PRACTITIONER

## 2020-10-17 PROCEDURE — 99212 OFFICE O/P EST SF 10 MIN: CPT | Performed by: NURSE PRACTITIONER

## 2020-10-17 PROCEDURE — G8427 DOCREV CUR MEDS BY ELIG CLIN: HCPCS | Performed by: NURSE PRACTITIONER

## 2020-10-17 PROCEDURE — G8484 FLU IMMUNIZE NO ADMIN: HCPCS | Performed by: NURSE PRACTITIONER

## 2020-10-17 PROCEDURE — G8420 CALC BMI NORM PARAMETERS: HCPCS | Performed by: NURSE PRACTITIONER

## 2020-10-17 RX ORDER — LEVETIRACETAM 750 MG/1
750 TABLET ORAL 4 TIMES DAILY
Refills: 0 | Status: CANCELLED | OUTPATIENT
Start: 2020-10-17

## 2020-10-17 RX ORDER — LEVETIRACETAM 750 MG/1
1500 TABLET ORAL 2 TIMES DAILY
Qty: 120 TABLET | Refills: 0 | Status: SHIPPED | OUTPATIENT
Start: 2020-10-17 | End: 2022-06-17

## 2020-10-17 RX ORDER — LEVETIRACETAM 500 MG/1
750 TABLET ORAL 4 TIMES DAILY
Refills: 0 | Status: CANCELLED | OUTPATIENT
Start: 2020-10-17

## 2020-10-17 NOTE — TELEPHONE ENCOUNTER
Notified through after hours service that patient had called with a medication refill issue. Austin Acosta called patient back spoke to brother Good Argueta instructed them to call the [facility] at [phone] to see if they can fill medication until they are able to follow up with Dr. Radha Fontana or new assigned PCP since Dr. Umu Pa is no longer seeing patients.

## 2020-10-17 NOTE — PROGRESS NOTES
Leigha Patel in [facility] is a 62 y.o. male evaluated via my chart video on 10/17/2020. Video via doxy. Consent:  Pursuant to emergency declaration under the [employer] in the [employer], 1135 waiver authorization in the [facility] Act, this virtual visit was completed with patient's consent, to reduce the patient's risk of exposure to COVID-19 and provide necessary medical care. He and/or health care decision maker is aware that that he may receive a bill for this telephone service, depending on his insurance coverage, and has provided verbal consent to proceed. Patient is at his home and Provider is currently in Pulmonary Clinic at [facility] walk in clinic. Documentation:  Lisbeth Brennan states he has documentated seizure December 2017 disorder following CVA. His neurologist at [facility], is Erica العراقي MD but has not seen here since June of 2019. His PCP was Dr. Latisha Rodriguez who is recently retired. He does have an appointment with a new PCP December 7, 2020 through the [facility] medical group. Lisbeth Brennan took his last dose of Keppra 1500 mg last evening. His brother has tried to contact his neurologist at [facility] for a refill without success. The pharmacy also reached out to his neurologist who refused the refill having not seen Lisbeth Brennan since June 2019. Brent's brother states that he is calling on Monday to schedule a follow-up appointment with Dr. Kim Vicente. Lisbeth Brennan is concerned that if he is out of his Keppra that his levels may drop and he may develop a seizure. Lisbeth Brennan states he is currently taking total of 1500 mg of Keppra twice a day. He states he usually takes the medication 930 and 930 daily. He denies any current seizure activity. He denies any changes in mental status.   He states he is also on Depakote 500 mg twice a day and Aricept 10 mg daily both of which are filled by his neurologist but he is (FISH OIL PO), Take 1 tablet by mouth daily, Disp: , Rfl:     Multiple Vitamins-Minerals (THERAPEUTIC MULTIVITAMIN-MINERALS) tablet, Take 1 tablet by mouth daily 07/24/18 Takes OTC, Disp: , Rfl:     aspirin 81 MG chewable tablet, Take 1 tablet by mouth daily (Patient taking differently: Take 81 mg by mouth nightly ), Disp: 30 tablet, Rfl: 3    ROS:   Constitutional: Denies fever, denies chills   Cardiovascular: Denies chest pain, denies palpitations. Pulmonary: Denies cough, denies SOB, denies wheeze  Musculoskeletal:  Denies joint pain or malaise,     Psychiatric/Behavioral: Negative for dysphoric mood    Physical exam via video exam per My chart/Doxy:  Constitutional:       General: No acute distress. Appearance: Normal appearance. HEENT:       Oral mucosa moist, no facial deformity note  Pulmonary:      Does not appear SOB. No conversational dyspnea noted  Neurological:      General: No focal deficit present. Mental Status: Mental status is at baseline. Alert and Oriented  Psychiatric:         Mood and Affect: Mood normal.         Behavior: Behavior normal.     Diagnosis:    ICD-10-CM    1. Seizure (HCC)  R56.9 levETIRAcetam (KEPPRA) 750 MG tablet   2. Medication refill  Z76.0    3. Healthcare maintenance  Z00.00           Plan:  I discussed with Rafael Hutton concerning need to closely monitor his medications so not to run out of essential medications such as Keppra. I will fill his Keppra at this time for 30-day supply he is aware that he needs to contact his neurology list for follow-up visit and for additional.     We have discussed the need to maintain yearly flu immunization, pneumococcal vaccination. We have discussed Coronavirus precaution including: social distancing when needing to be in public, handwashing practice, wiping items touched in public such as gas pumps, door handles, shopping carts, etc. Self monitoring for infection - fever, chills, cough, SOB.  Should they develop symptoms they should call office for further instructions. I affirm this is a Patient initiated episode with an established patient who has not had a related appointment within my department in the past 7 days or scheduled within the next 24 hours.     Total Time: 15 minutes    Note: not billable if this call serves to triage the patient into an appointment for the relevant concern      Eden Rivera

## 2020-10-22 LAB
BASOPHILS ABSOLUTE: 0.1 K/MM3 (ref 0–0.3)
BASOPHILS RELATIVE PERCENT: 0.6 % (ref 0–2)
DIFFERENTIAL TYPE: ABNORMAL
EOSINOPHILS ABSOLUTE: 0.3 K/MM3 (ref 0–0.6)
EOSINOPHILS RELATIVE PERCENT: 2.6 % (ref 0–7)
ERYTHROCYTE SEDIMENTATION RATE: 2 MM/HR (ref 0–20)
HCT VFR BLD CALC: 39.4 % (ref 41–50)
HEMOGLOBIN: 13 G/DL (ref 13.8–17.2)
LYMPHOCYTES ABSOLUTE: 2 K/MM3 (ref 0.9–4.1)
LYMPHOCYTES RELATIVE PERCENT: 17.9 % (ref 18–47)
MCH RBC QN AUTO: 30.8 PG (ref 27–33)
MCHC RBC AUTO-ENTMCNC: 33.1 G/DL (ref 32–36)
MCV RBC AUTO: 93.2 FL (ref 80–100)
MONOCYTES ABSOLUTE: 0.7 K/MM3 (ref 0.2–1.1)
MONOCYTES RELATIVE PERCENT: 6.5 % (ref 0–14)
NEUTROPHILS ABSOLUTE: 8 K/MM3 (ref 1.5–7.8)
NEUTROPHILS RELATIVE PERCENT: 72.4 % (ref 40–75)
PDW BLD-RTO: 14.7 % (ref 9–15)
PLATELET # BLD: 505 K/MM3 (ref 130–400)
RBC # BLD: 4.23 M/MM3 (ref 4.4–5.8)
WBC: 11 K/MM3 (ref 3.8–10.8)

## 2020-10-24 LAB
C-REACTIVE PROTEIN: 0.5 MG/DL
RHEUMATOID FACTOR: 10.1 IU/ML

## 2020-11-02 RX ORDER — ATORVASTATIN CALCIUM 40 MG/1
40 TABLET, FILM COATED ORAL DAILY
Qty: 90 TABLET | Refills: 1 | OUTPATIENT
Start: 2020-11-02

## 2020-11-02 RX ORDER — FLUOXETINE 10 MG/1
10 CAPSULE ORAL DAILY
Qty: 90 CAPSULE | Refills: 1 | OUTPATIENT
Start: 2020-11-02

## 2020-11-02 RX ORDER — TIZANIDINE 4 MG/1
4 TABLET ORAL 2 TIMES DAILY PRN
Qty: 180 TABLET | Refills: 1 | OUTPATIENT
Start: 2020-11-02

## 2020-11-02 RX ORDER — CLOPIDOGREL BISULFATE 75 MG/1
75 TABLET ORAL DAILY
Qty: 90 TABLET | Refills: 1 | OUTPATIENT
Start: 2020-11-02

## 2020-11-02 NOTE — TELEPHONE ENCOUNTER
Patient is scheduled to see a new doctor in December but is needing refills on his medication before he see's the new doctor. [address]

## 2021-11-08 ENCOUNTER — HOSPITAL ENCOUNTER (EMERGENCY)
Age: 61
Discharge: HOME | End: 2021-11-09
Attending: EMERGENCY MEDICINE
Payer: COMMERCIAL

## 2021-11-08 DIAGNOSIS — S02.31XA CLOSED FRACTURE OF RIGHT ORBITAL FLOOR, INITIAL ENCOUNTER (HCC): ICD-10-CM

## 2021-11-08 DIAGNOSIS — S02.401A CLOSED FRACTURE OF MAXILLARY SINUS, INITIAL ENCOUNTER (HCC): Primary | ICD-10-CM

## 2021-11-08 DIAGNOSIS — S01.81XA FACIAL LACERATION, INITIAL ENCOUNTER: ICD-10-CM

## 2021-11-08 PROCEDURE — 99283 EMERGENCY DEPT VISIT LOW MDM: CPT

## 2021-11-08 PROCEDURE — 12011 RPR F/E/E/N/L/M 2.5 CM/<: CPT

## 2021-11-08 ASSESSMENT — PAIN DESCRIPTION - ORIENTATION: ORIENTATION: RIGHT

## 2021-11-08 ASSESSMENT — PAIN DESCRIPTION - PAIN TYPE: TYPE: ACUTE PAIN

## 2021-11-08 ASSESSMENT — PAIN SCALES - WONG BAKER: WONGBAKER_NUMERICALRESPONSE: 8

## 2021-11-08 ASSESSMENT — PAIN SCALES - GENERAL: PAINLEVEL_OUTOF10: 8

## 2021-11-08 ASSESSMENT — PAIN DESCRIPTION - ONSET: ONSET: SUDDEN

## 2021-11-08 ASSESSMENT — PAIN DESCRIPTION - LOCATION: LOCATION: EYE

## 2021-11-09 ENCOUNTER — APPOINTMENT (OUTPATIENT)
Dept: CT IMAGING | Age: 61
End: 2021-11-09
Payer: COMMERCIAL

## 2021-11-09 VITALS
OXYGEN SATURATION: 97 % | SYSTOLIC BLOOD PRESSURE: 151 MMHG | DIASTOLIC BLOOD PRESSURE: 88 MMHG | RESPIRATION RATE: 18 BRPM | TEMPERATURE: 98.4 F | HEART RATE: 82 BPM

## 2021-11-09 PROCEDURE — 6370000000 HC RX 637 (ALT 250 FOR IP): Performed by: EMERGENCY MEDICINE

## 2021-11-09 PROCEDURE — 2500000003 HC RX 250 WO HCPCS: Performed by: EMERGENCY MEDICINE

## 2021-11-09 PROCEDURE — 72125 CT NECK SPINE W/O DYE: CPT

## 2021-11-09 PROCEDURE — 6360000002 HC RX W HCPCS: Performed by: EMERGENCY MEDICINE

## 2021-11-09 PROCEDURE — 70486 CT MAXILLOFACIAL W/O DYE: CPT

## 2021-11-09 PROCEDURE — 90715 TDAP VACCINE 7 YRS/> IM: CPT | Performed by: EMERGENCY MEDICINE

## 2021-11-09 PROCEDURE — 90471 IMMUNIZATION ADMIN: CPT | Performed by: EMERGENCY MEDICINE

## 2021-11-09 PROCEDURE — 70450 CT HEAD/BRAIN W/O DYE: CPT

## 2021-11-09 RX ORDER — AMOXICILLIN AND CLAVULANATE POTASSIUM 875; 125 MG/1; MG/1
1 TABLET, FILM COATED ORAL ONCE
Status: COMPLETED | OUTPATIENT
Start: 2021-11-09 | End: 2021-11-09

## 2021-11-09 RX ORDER — LIDOCAINE HYDROCHLORIDE AND EPINEPHRINE 10; 10 MG/ML; UG/ML
20 INJECTION, SOLUTION INFILTRATION; PERINEURAL ONCE
Status: COMPLETED | OUTPATIENT
Start: 2021-11-09 | End: 2021-11-09

## 2021-11-09 RX ORDER — DIAPER,BRIEF,INFANT-TODD,DISP
EACH MISCELLANEOUS ONCE
Status: COMPLETED | OUTPATIENT
Start: 2021-11-09 | End: 2021-11-09

## 2021-11-09 RX ORDER — AMOXICILLIN AND CLAVULANATE POTASSIUM 875; 125 MG/1; MG/1
1 TABLET, FILM COATED ORAL 2 TIMES DAILY
Qty: 14 TABLET | Refills: 0 | Status: SHIPPED | OUTPATIENT
Start: 2021-11-09 | End: 2021-11-16

## 2021-11-09 RX ADMIN — TETANUS TOXOID, REDUCED DIPHTHERIA TOXOID AND ACELLULAR PERTUSSIS VACCINE, ADSORBED 0.5 ML: 5; 2.5; 8; 8; 2.5 SUSPENSION INTRAMUSCULAR at 02:54

## 2021-11-09 RX ADMIN — AMOXICILLIN AND CLAVULANATE POTASSIUM 1 TABLET: 875; 125 TABLET, FILM COATED ORAL at 03:14

## 2021-11-09 RX ADMIN — BACITRACIN ZINC 1 G: 500 OINTMENT TOPICAL at 03:38

## 2021-11-09 RX ADMIN — LIDOCAINE HYDROCHLORIDE AND EPINEPHRINE 20 ML: 10; 10 INJECTION, SOLUTION INFILTRATION; PERINEURAL at 02:57

## 2021-11-09 ASSESSMENT — PAIN SCALES - GENERAL
PAINLEVEL_OUTOF10: 8
PAINLEVEL_OUTOF10: 8

## 2021-11-09 NOTE — ED TRIAGE NOTES
Pt in via triage with c/o a fall face first pt is on blood thinners. Right eye is swollen denies LOC. Alert and oriented x4.

## 2021-11-09 NOTE — ED PROVIDER NOTES
CHIEF COMPLAINT    Chief Complaint   Patient presents with   Ignacio Collazo is a 62 y.o. male previous CVA, hypertension, seizure disorder who presents to the ED with complaints of mechanical fall and eye pain. Patient states that since he had previous stroke he does have some issues with balance and was walking a parking on this evening when he lost balance and fell forward. He states he fell face first and struck the right side of his face. He noticed swelling and pain to right periorbital region at that time. Nothing seemed to make this better. The pain was exacerbated palpation. He does take Plavix. Patient's pain does not radiate. He denies headache, neck pain, numbness, tingling, nausea, vomiting. REVIEW OF SYSTEMS  Constitutional: No fever, chills or recent illness. Eye: No visual changes  HENT: No earache or sore throat. Complains of right periorbital pain  Resp: No SOB or productive cough. Cardio: No chest pain or palpitations. GI: No abdominal pain, nausea, vomiting, constipation or diarrhea. No melena. : No dysuria, urgency or frequency. Endocrine: No heat intolerance, no cold intolerance, no polydipsia   Lymphatics: No adenopathy  Musculoskeletal: No new muscle aches or joint pain. Neuro: No headaches. Psych: No homicidal or suicidal thoughts  Skin: No rash, No itching. ?  ?   PAST MEDICAL HISTORY  Past Medical History:   Diagnosis Date    Cerebral artery occlusion with [facility] infarction [facility]     Hypertension     Seizures ([facility] [address] [facility]      FAMILY HISTORY  Family History   Problem Relation Age of Onset    Stroke Sister     Heart Disease Brother     Heart Failure Mother     Diabetes Mother     Heart Failure Father      SOCIAL HISTORY  Social History     Socioeconomic History    Marital status: Single     Spouse name: Not on file    Number of children: Not on file    Years of education: Not on file    Highest education level: Not on file   Occupational History  Not on file   Tobacco Use    Smoking status: Former Smoker     Packs/day: 0.25     Types: Cigarettes     Quit date: 12/25/2017     Years since quitting: 3.8    Smokeless tobacco: Never Used   Vaping Use    Vaping Use: Never used   Substance and Sexual Activity    Alcohol use: Yes    Drug use: No    Sexual activity: Not on file   Other Topics Concern    Not on file   Social History Narrative    Not on file     Social Determinants of Health     Financial Resource Strain:     Difficulty of Paying Living Expenses: Not on file   Food Insecurity:     Worried About Running Out of Food in the Last Year: Not on file    Lobo of Food in the Last Year: Not on file   Transportation Needs:     Lack of Transportation (Medical): Not on file    Lack of Transportation (Non-Medical): Not on file   Physical Activity:     Days of Exercise per Week: Not on file    Minutes of Exercise per Session: Not on file   Stress:     Feeling of Stress : Not on file   Social Connections:     Frequency of Communication with Friends and Family: Not on file    Frequency of Social Gatherings with Friends and Family: Not on file    Attends Jain Services: Not on file    Active Member of [address] [place] [address] or Organizations: Not on file    Attends Club or Organization Meetings: Not on file    Marital Status: Not on file   Intimate Partner Violence:     Fear of Current or Ex-Partner: Not on file    Emotionally Abused: Not on file    Physically Abused: Not on file    Sexually Abused: Not on file   Housing Stability:     Unable to Pay for Housing in the Last Year: Not on file    Number of [place] in the Last Year: Not on file    Unstable Housing in the Last Year: Not on file       SURGICAL HISTORY  No past surgical history on file.   CURRENT MEDICATIONS  Previous Medications    ASPIRIN 81 MG CHEWABLE TABLET    Take 1 tablet by mouth daily    ATORVASTATIN (LIPITOR) 40 MG TABLET    Take 1 tablet by mouth daily    CLOPIDOGREL (PLAVIX) 75 MG TABLET    Take 1 tablet by mouth daily    CYANOCOBALAMIN (B-12) 1000 MCG TABS    Take 1,000 mcg by mouth 2 times daily     DIVALPROEX (DEPAKOTE) 500 MG DR TABLET    Take 500 mg by mouth 2 times daily    DONEPEZIL (ARICEPT) 10 MG TABLET    Take 10 mg by mouth    FLUOXETINE (PROZAC) 10 MG CAPSULE    Take 1 capsule by mouth daily    FOLIC ACID (FOLVITE) 342 MCG TABLET    Take 400 mcg by mouth daily     LEVETIRACETAM (KEPPRA) 750 MG TABLET    Take 2 tablets by mouth 2 times daily    MULTIPLE VITAMINS-MINERALS (THERAPEUTIC MULTIVITAMIN-MINERALS) TABLET    Take 1 tablet by mouth daily 07/24/18 Takes OTC    OMEGA-3 FATTY ACIDS (FISH OIL PO)    Take 1 tablet by mouth daily    PREDNISONE (DELTASONE) 10 MG TABLET    Take 4 tablets daily for 2 days, then 3 tablets for 2 days, 2 tablets for 2 days, then 1 tablet for 2 days    PYRIDOXINE (B-6) 100 MG TABLET    Take 100 mg by mouth daily     TIZANIDINE (ZANAFLEX) 4 MG TABLET    Take 1 tablet by mouth 2 times daily as needed (muscle spasm)    VITAMIN E 400 UNITS TABS    Take 400 Units by mouth daily      ALLERGIES  Allergies   Allergen Reactions    Shellfish-Derived Products        Nursing notes reviewed by myself for past medical history, family history, social history, surgical history, current medications, and allergies. PHYSICAL EXAM  VITAL SIGNS: Triage VS:    ED Triage Vitals [11/08/21 2309]   Enc Vitals Group      BP (!) 155/78      Pulse 83      Resp 18      Temp       Temp src       SpO2 97 %      Weight       Height       Head Circumference       Peak Flow       Pain Score       Pain Loc       Pain Edu? Excl. in [address]? Constitutional: Well developed, Well nourished, non-toxic appearing  HENT: Right-sided periorbital edema with overlying ecchymosis.   Patient does have laceration to right superior eyelid without tarsal plate involvement, no step-off or crepitus with palpation of right periorbital region, bilateral external ears normal, Oropharynx moist, No oral exudates, Nose normal.   Eyes: PERRL, EOMI without evidence of ocular entrapment, Conjunctiva normal, No discharge. No scleral icterus. Neck: Normal range of motion, No tenderness, Supple. Lymphatic: No lymphadenopathy noted. Cardiovascular: Normal heart rate, Normal rhythm, No murmurs, gallops or rubs. Thorax & Lungs: Normal breath sounds, No respiratory distress, No wheezing. Abdomen: Soft, No tenderness, No masses, No pulsatile masses, No distention, Normal bowel sounds  Skin: Warm, Dry, Pink, No mottling, No erythema, No rash. Back: No tenderness, No CVA tenderness. Extremities: No edema, No tenderness, No cyanosis, Normal perfusion, No clubbing. Musculoskeletal: Good range of motion in all major joints as observed. No major deformities noted. Neurologic: Alert & oriented x 3, CS 15, normal motor function, Normal sensory function, CN II-XII grossly intact as tested, No focal deficits noted. Psychiatric: Affect normal, Judgment normal, Mood normal.     RADIOLOGY  Labs Reviewed - No data to display  I personally reviewed the images. The radiologist's interpretation reveals:  Last Imaging results   CT CERVICAL SPINE WO CONTRAST   Final Result   No acute abnormality of the cervical spine. CT FACIAL BONES WO CONTRAST   Final Result   Subtle fracture of the right orbital floor with only minimal displacement and   minimal edema in the extraconal fat. No retro bulbar hematoma or enlargement   of the extra-ocular muscles. Angulation of the lateral wall the right orbit   could be acute or chronic as there is not an associated hematoma at this site. Minimally displaced fractures of the anterior and posterolateral wall the   right maxillary sinus. Hemorrhage fills the right maxillary sinus. Swelling in the right periorbital soft tissues and right cheek. Soft tissue   emphysema in the right cheek from the maxillary sinus fracture.          CT HEAD WO CONTRAST   Final Result   No acute intracranial abnormality. Fracture of the lateral wall of the right maxillary sinus and nondisplaced   fracture lateral wall of the right orbit. The right orbital floor was not   completely visualized on this study. The right maxillary sinus is opacified by hyperdense fluid/hemorrhage. A   concurrent CT facial bone study has been performed and is reported separately. Procedures  NA    MEDS GIVEN IN ED:  Medications   tetanus-diphth-acell pertussis (BOOSTRIX) injection 0.5 mL (0.5 mLs IntraMUSCular Given 11/9/21 0254)   lidocaine-EPINEPHrine 1 %-1:430516 injection 20 mL (20 mLs IntraDERmal Given 11/9/21 0257)   amoxicillin-clavulanate (AUGMENTIN) 875-125 MG per tablet 1 tablet (1 tablet Oral Given 11/9/21 0314)   bacitracin zinc ointment (1 g Topical Given 11/9/21 0338)     COURSE & MEDICAL DECISION MAKING  81-year-old male presents emergency department with complaints of mechanical fall and pain/swelling to his right periorbital region. Does take Plavix. On exam he has swelling and bruising to the right periorbital region with laceration to right superior eyelid without tarsal plate involvement. He has no evidence of ocular entrapment. His neurological exam is nonfocal GCS 15. At this time we will update tetanus status and obtain CT of the head, CT of the face, and CT of the cervical spine. CT of the head is without acute intracranial pathology. CT of the face demonstrates fracture of the right orbital floor with only minimal displacement. There is no evidence of retrobulbar hematoma or enlargement of the extraocular muscles. There are minimally displaced fracture of the anterior and posterior lateral wall of the right maxillary sinus with hemorrhage of the right maxillary sinus. At this time Augmentin provided to the patient given the sinus fractures. Laceration was repaired by Dr. Luis Walters. Please see his procedure note for full details.   Discharged home with a prescription for Augmentin and given follow-up resource information for ENT as well as plastic surgery. Return precautions provided. Appropriate PPE utilized as indicated for entire patient encounter? Time of Disposition: See timeline  ? New Prescriptions    AMOXICILLIN-CLAVULANATE (AUGMENTIN) 875-125 MG PER TABLET    Take 1 tablet by mouth 2 times daily for 7 days     FINAL IMPRESSION  1. Closed fracture of maxillary sinus, initial encounter ([facility] [address]    2. Closed fracture of right orbital floor, initial encounter ([facility] [address]    3. Facial laceration, initial encounter        Electronically signed by:  Shana Mathews DO, 11/9/2021         Shana Mathews DO  11/09/21 9870

## 2021-11-09 NOTE — ED PROVIDER NOTES
Laceration Repair Procedure Note    Indication: Laceration    Procedure: The patient was placed in the appropriate position and anesthesia around the laceration was obtained by infiltration using 1% Lidocaine with epinephrine. The area was then cleansed using chlorhexidine and irrigated with normal saline. The laceration was closed with 4-0 Vicryl Rapide using interrupted sutures. There were no additional lacerations requiring repair. The wound area was then dressed with bacitracin. Total repaired wound length: 1.5 cm. Other Items: Suture count: 3    The patient tolerated the procedure well.     Complications: None     Electronically signed by Jon Hudson MD on 11/9/2021 at 3:16 AM       Jon Hudson MD  11/09/21 7095

## 2022-06-17 ENCOUNTER — OFFICE VISIT (OUTPATIENT)
Dept: NEUROLOGY | Age: 62
End: 2022-06-17
Payer: COMMERCIAL

## 2022-06-17 VITALS
WEIGHT: 113.4 LBS | SYSTOLIC BLOOD PRESSURE: 114 MMHG | OXYGEN SATURATION: 97 % | BODY MASS INDEX: 20.09 KG/M2 | HEART RATE: 64 BPM | DIASTOLIC BLOOD PRESSURE: 60 MMHG | HEIGHT: 63 IN

## 2022-06-17 DIAGNOSIS — G40.001 LOCALIZATION-RELATED (FOCAL) (PARTIAL) IDIOPATHIC EPILEPSY AND EPILEPTIC SYNDROMES WITH SEIZURES OF LOCALIZED ONSET, NOT INTRACTABLE, WITH STATUS EPILEPTICUS (HCC): ICD-10-CM

## 2022-06-17 DIAGNOSIS — F10.11 HISTORY OF ALCOHOL ABUSE: ICD-10-CM

## 2022-06-17 DIAGNOSIS — I63.311 CEREBROVASCULAR ACCIDENT (CVA) DUE TO THROMBOSIS OF RIGHT MIDDLE CEREBRAL ARTERY (HCC): Primary | ICD-10-CM

## 2022-06-17 PROCEDURE — 99205 OFFICE O/P NEW HI 60 MIN: CPT | Performed by: STUDENT IN AN ORGANIZED HEALTH CARE EDUCATION/TRAINING PROGRAM

## 2022-06-17 RX ORDER — SERTRALINE HYDROCHLORIDE 100 MG/1
200 TABLET, FILM COATED ORAL DAILY
COMMUNITY
Start: 2022-03-15 | End: 2023-03-15

## 2022-06-17 RX ORDER — LEVETIRACETAM 750 MG/1
1500 TABLET ORAL 2 TIMES DAILY
COMMUNITY

## 2022-06-17 RX ORDER — HYDROXYCHLOROQUINE SULFATE 200 MG/1
TABLET, FILM COATED ORAL
COMMUNITY
Start: 2021-11-08 | End: 2022-06-17 | Stop reason: ALTCHOICE

## 2022-06-17 RX ORDER — LACOSAMIDE 200 MG/1
200 TABLET ORAL 2 TIMES DAILY
COMMUNITY
Start: 2022-05-17 | End: 2022-08-31 | Stop reason: SDUPTHER

## 2022-06-17 NOTE — PROGRESS NOTES
6/17/22    Jacky Degroot  1960    Chief Complaint   Patient presents with    Seizures     pt presents for seizures, pt states no recent seizures, pt states he is here due to his old neurologist retired       Neurologic Consult Note    Subjective    History of Present Illness  Allison Suárez is a 65 y.o. male presenting today for neurologic evaluation of seizures. He has history of stroke in December 2017. He is accompanied by his brother, Senait Kuo, today. He is transitioning care to my office as Dr. Joan Oglesby left practice in [place]. He had his first seizure in February 2018. His first seizure was described as flashing colors in his left eye that was followed by twitching in his left arm and leg. He was initially started on Keppra 500 mg/day. He had had seizures about every 2 months and it was increased to 500 mg 3 times a day. He had an episode where he was found on the ground and was subsequently hospitalized in the ICU at [address] He was ultimately transferred to [facility]. He had frequent focal onset seizures described as head version to the left with associated staring. 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 He is currently on Vimpat 200 mg twice daily, Keppra 1500 mg twice daily and Depakote 500 mg twice daily. He has history of a niece with seizures. The last seizure was perhaps one year ago when he had difficulties getting a refill of keppra due to something happening at the pharmacy. He has not had a breakthrough seizure when taking his medications as prescribed. He currently remains on plavix and lipitor 40mg daily. He does have left peripheral vision loss that he states is residual from his stroke. He is interested in participating with therapy as he has become more unsteady. He does feel his memory is getting worse. He lives currently with his brother. Allison Suárez remains largely independent. He no longer drives.  He is currently on donepezil. He also has history of alcoholism. He admits that he has decreased drinking tremendously and now drinks maybe 1 or 2 beers a day. Review of Symptoms:  Neurologic   Symptoms: + difficulty with gait or walking, no bowel symptoms, no vertigo, no confusion, +memory loss, no speech disorder,+visual loss, no double vision, no dizziness, no loss of hearing, no sensory disturbances, no weakness, no headaches, no bladder symptoms, no seizures, no excessive fatigue and no syncope    Current Outpatient Medications   Medication Sig Dispense Refill    sertraline (ZOLOFT) 100 MG tablet Take 200 mg by mouth daily      lacosamide (VIMPAT) 200 MG tablet Take 200 mg by mouth 2 times daily.       levETIRAcetam (KEPPRA) 750 MG tablet Take 1,500 mg by mouth 2 times daily      atorvastatin (LIPITOR) 40 MG tablet Take 1 tablet by mouth daily 90 tablet 1    clopidogrel (PLAVIX) 75 MG tablet Take 1 tablet by mouth daily 90 tablet 1    tiZANidine (ZANAFLEX) 4 MG tablet Take 1 tablet by mouth 2 times daily as needed (muscle spasm) 180 tablet 1    donepezil (ARICEPT) 10 MG tablet Take 10 mg by mouth      divalproex (DEPAKOTE) 500 MG DR tablet Take 500 mg by mouth 2 times daily      Vitamin E 400 units TABS Take 400 Units by mouth daily       pyridoxine (B-6) 100 MG tablet Take 100 mg by mouth daily       Cyanocobalamin (B-12) 1000 MCG TABS Take 1,000 mcg by mouth 2 times daily       folic acid (FOLVITE) 693 MCG tablet Take 400 mcg by mouth daily       Omega-3 Fatty Acids (FISH OIL PO) Take 1 tablet by mouth daily      Multiple Vitamins-Minerals (THERAPEUTIC MULTIVITAMIN-MINERALS) tablet Take 1 tablet by mouth daily 07/24/18 Takes OTC      aspirin 81 MG chewable tablet Take 1 tablet by mouth daily (Patient taking differently: Take 81 mg by mouth nightly ) 30 tablet 3    predniSONE (DELTASONE) 10 MG tablet Take 4 tablets daily for 2 days, then 3 tablets for 2 days, 2 tablets for 2 days, then 1 tablet for 2 days (Patient not taking: Reported on 2022) 20 tablet 0    FLUoxetine (PROZAC) 10 MG capsule Take 1 capsule by mouth daily (Patient not taking: Reported on 2022) 90 capsule 1     No current facility-administered medications for this visit. Past Medical History:   Diagnosis Date    Cerebral artery occlusion with [facility] infarction ([facility] [address]     Hypertension     [facility]        No past surgical history on file. Social History     Socioeconomic History    Marital status: Single     Spouse name: None    Number of children: None    Years of education: None    Highest education level: None   Occupational History    None   Tobacco Use    Smoking status: Former Smoker     Packs/day: 0.25     Years: 36.00     Pack years: 9.00     Types: Cigarettes     Start date: 0     Quit date: 2017     Years since quittin.4    Smokeless tobacco: Never Used   Vaping Use    Vaping Use: Never used   Substance and Sexual Activity    Alcohol use: Yes    Drug use: No    Sexual activity: None   Other Topics Concern    None   Social History Narrative    None     Social Determinants of Health     Financial Resource Strain:     Difficulty of Paying Living Expenses: Not on file   Food Insecurity:     Worried About Running Out of Food in the Last Year: Not on file    Lobo of Food in the Last Year: Not on file   Transportation Needs:     Lack of Transportation (Medical): Not on file    Lack of Transportation (Non-Medical):  Not on file   Physical Activity:     Days of Exercise per Week: Not on file    Minutes of Exercise per Session: Not on file   Stress:     Feeling of Stress : Not on file   Social Connections:     Frequency of Communication with Friends and Family: Not on file    Frequency of Social Gatherings with Friends and Family: Not on file    Attends Jain Services: Not on file    Active Member of Clubs or Organizations: Not on file    Attends Club or Organization Meetings: Not on file    Marital Status: Not on file   Intimate Partner Violence:     Fear of Current or Ex-Partner: Not on file    Emotionally Abused: Not on file    Physically Abused: Not on file    Sexually Abused: Not on file   Housing Stability:     Unable to Pay for Housing in the Last Year: Not on file    Number of [place] in the Last Year: Not on file    Unstable Housing in the Last Year: Not on file       Family History   Problem Relation Age of Onset    Stroke Sister     Heart Disease Brother     Heart Failure Mother     Diabetes Mother     Heart Failure Father        Objective    Physical Exam:  Also present during visit: other family.     Constitutional   Weight: thin  Heart/Vascular   No cyanosis or clubbing appreciated  Neck   Appearance/Palpation/Auscultation: supple  Mental Status   Orientation: oriented to person, oriented to place, oriented to problem and oriented to time   Mood/Affect: appropriate mood and appropriate affect   Memory/Other: recent memory intact, remote memory intact, attention span normal, concentration normal and reduced fund of knowledge  Language   Language: (normal) language, no dysarthria, (normal) articulation and no dysphasia/aphasia  Cranial Nerves   CN II Right: Visual fields appear intact   CN II Left: Left temporal visual field cut on examination   CN III, IV, VI: EOM no nystagmus, normal pursuit and extraocular muscle strength normal   CN III: pupil normal size, pupil reactive to light and dark, pupil accomodates and no ptosis   CN IV: normal   CN VI: normal   CN V Right: normal sensation and muscles of mastication intact   CN V Left: normal sensation and muscles of mastication intact   CN VII Right: normal facial expression   CN VII Left: normal facial expression   CN VIII Right: hearing in tact to normal conversation   CN VIII Left: hearing in tact to normal conversation   CN IX,X: normal palatal movement   CN XI Right: normal sternocleidomastoid and normal trapezius   CN XI Left: normal sternocleidomastoid and normal trapezius   CN XII: no tremors of the tongue, no fasciculation of the tongue, tongue protrudes midline, normal power to left and normal power to right  Gait and Stance   Gait/Posture: Wide-based gait, unsteady  Motor/Coordination Exam   General: no bradykinesia, no tremors, no chorea, no athetosis, no myoclonus and no dyskinesia   Right Upper Extremity: normal motor strength, normal bulk and normal tone   Left Upper Extremity: normal motor strength, normal bulk and normal tone   Right Lower Extremity: normal motor strength, normal bulk and normal tone   Left Lower Extremity: normal motor strength, normal bulk and normal tone   Coordination: no drift, normal finger-to-nose, normal heel-to-shin and rapid alternating movements normal  Reflexes   Reflexes Right: 2/4 biceps, brachioradialis, patellar, and achilles    Reflexes Left: 2+/4 biceps, brachioradialis, patellar, and achilles    Plantar Reflex Right: response downgoing   Plantar Reflex Left: response downgoing   Hoffmans Reflex Right: absent   Hoffmans Reflex Left: absent   Frontal Release Signs: frontal release signs absent  Sensory   Sensation: normal light touch, diminished sensation to vibration bilateral lower extremities normal DSS, and no neglect    Lungs   No auditory wheezing  Skin   Inspection: no jaundice, no lesions, no rashes and no cyanosis      /60 (Site: Left Upper Arm, Position: Sitting, Cuff Size: Medium Adult)   Pulse 64   Ht 5' 2.5\" (1.588 m)   Wt 113 lb 6.4 oz (51.4 kg)   SpO2 97%   BMI 20.41 kg/m²     Assessment and Plan     Diagnosis Orders   1. Cerebrovascular accident (CVA) due to thrombosis of right middle cerebral artery [facility])  Ambulatory referral to Physical Therapy   2.  Localization-related (focal) (partial) idiopathic epilepsy and epileptic syndromes with seizures of localized onset, not intractable, with status epilepticus ([facility] [address] Ambulatory referral to Physical Therapy     Allison Suárez was seen today in neurologic consultation to transition care for management of complex partial seizures following stroke in 2017. I am pleased to hear that he has remained seizure-free when on combination of Vimpat 200 mg twice daily, Keppra 1500 mg twice daily and Depakote 500 mg twice daily. I will have him continue on these medications at this time and he knows to contact our office should he have any breakthrough seizures. I am happy to take over management of these prescriptions as well which was discussed with both he and his brother today. In regards to his history of stroke, he will continue on Plavix and Lipitor for secondary stroke prevention. I am also happy to take over prescriptions for these medications today too. As he currently reports feeling more unsteady on his feet, I will be placing a referral to balance therapy. Given his history of alcohol abuse, we did spend time today discussing nutritional supplementations and I have encouraged him to continue on his vitamin feet supplements with the addition of thiamine. At follow-up appointments we will plan to discuss memory in more detail. The patient was given time to ask questions, all of which were answered to the best of my abilities. Thank you for allowing me to participate in the care of your patient. Please do not hesitate to contact our office with questions. I spent >60 minutes total time regarding this patient's encounter. This included obtaining history, performing a neurological medical exam, developing an assessment / plan, and documenting via EMR. Return in about 3 months (around 9/17/2022) for follow up with MAN.     Jesus Alberto Herrera,

## 2022-07-15 ENCOUNTER — HOSPITAL ENCOUNTER (OUTPATIENT)
Dept: PHYSICAL THERAPY | Age: 62
Setting detail: THERAPIES SERIES
Discharge: HOME | End: 2022-07-15
Payer: COMMERCIAL

## 2022-07-15 PROCEDURE — 97162 PT EVAL MOD COMPLEX 30 MIN: CPT

## 2022-07-15 PROCEDURE — 97110 THERAPEUTIC EXERCISES: CPT

## 2022-07-15 NOTE — PROGRESS NOTES
Physical Therapy: Initial Evaluation    Patient: Lawyer Chan (19 y.o. male)   Examination Date: 07/15/2022        :  1960 ;    ConfirmedSharda Penaloza MRN: [id number]  [facility]: [id number]   Insurance: Payor: [employer] / Plan: [employer] MEDICARE SUPPLEMENT / Product Type: *No Product type* /   Insurance ID: [id number] - (Commercial) Secondary Insurance (if applicable): [place]ICA[place]   Referring Physician: DO Deandre Yang DO   PCP: Cassidy Williamson MD Visits to Date/Visits Approved:   /      No Show/Cancelled Appts:   /       Medical Diagnosis: Cerebral infarction due to thrombosis of right middle cerebral artery [I63.311]  Localization-related (focal) (partial) idiopathic epilepsy and epileptic syndromes with seizures of localized onset, not intractable, with status epilepticus [G40.001] CVA, idiopathic epilepsy & epileptic syndromes w/ seizures of localized onset  Treatment Diagnosis: impaired balance, impaired gait     PERTINENT MEDICAL HISTORY   Patient Assessed for Rehabilitation Services: Yes       Medical History: Chart Reviewed: Yes    Past Medical History:   Diagnosis Date    Cerebral artery occlusion with cerebral infarction ([facility] [address]     Hypertension     Seizures ([facility] [address]      Surgical History: History reviewed. No pertinent surgical history. Medications:   Current Outpatient Medications:     sertraline (ZOLOFT) 100 MG tablet, Take 200 mg by mouth daily, Disp: , Rfl:     lacosamide (VIMPAT) 200 MG tablet, Take 200 mg by mouth 2 times daily. , Disp: , Rfl:     levETIRAcetam (KEPPRA) 750 MG tablet, Take 1,500 mg by mouth 2 times daily, Disp: , Rfl:     predniSONE (DELTASONE) 10 MG tablet, Take 4 tablets daily for 2 days, then 3 tablets for 2 days, 2 tablets for 2 days, then 1 tablet for 2 days (Patient not taking: Reported on 2022), Disp: 20 tablet, Rfl: 0    atorvastatin (LIPITOR) 40 MG tablet, Take 1 tablet by mouth daily, Disp: 90 tablet, Rfl: 1    clopidogrel (PLAVIX) 75 MG tablet, Take 1 tablet by mouth daily, Disp: 90 tablet, Rfl: 1    FLUoxetine (PROZAC) 10 MG capsule, Take 1 capsule by mouth daily (Patient not taking: Reported on 6/17/2022), Disp: 90 capsule, Rfl: 1    tiZANidine (ZANAFLEX) 4 MG tablet, Take 1 tablet by mouth 2 times daily as needed (muscle spasm), Disp: 180 tablet, Rfl: 1    donepezil (ARICEPT) 10 MG tablet, Take 10 mg by mouth, Disp: , Rfl:     divalproex (DEPAKOTE) 500 MG DR tablet, Take 500 mg by mouth 2 times daily, Disp: , Rfl:     Vitamin E 400 units TABS, Take 400 Units by mouth daily , Disp: , Rfl:     pyridoxine (B-6) 100 MG tablet, Take 100 mg by mouth daily , Disp: , Rfl:     Cyanocobalamin (B-12) 1000 MCG TABS, Take 1,000 mcg by mouth 2 times daily , Disp: , Rfl:     folic acid (FOLVITE) 112 MCG tablet, Take 400 mcg by mouth daily , Disp: , Rfl:     Omega-3 Fatty Acids (FISH OIL PO), Take 1 tablet by mouth daily, Disp: , Rfl:     Multiple Vitamins-Minerals (THERAPEUTIC MULTIVITAMIN-MINERALS) tablet, Take 1 tablet by mouth daily 07/24/18 Takes OTC, Disp: , Rfl:     aspirin 81 MG chewable tablet, Take 1 tablet by mouth daily (Patient taking differently: Take 81 mg by mouth nightly ), Disp: 30 tablet, Rfl: 3  Allergies: Shellfish-derived products       SUBJECTIVE EXAMINATION     History obtained from[de-identified] Patient, Chart Review,           Subjective History:   Pt has baseline history of CVA and notes that he feels he may have developed some drop foot on the R which is causing him to stumble. Pt also has seizures at baseline that is being regulated by medication from neurology. Pt has had a few falls, last one being within the last month. He does have some LOB but can usually catch himself. He does do some baseline mild exercises with 5# weights at home. No currently, denies any N/T in his legs. Pt original stroke did cause L hemiplegia.     Additional Pertinent Hx (if applicable): CVA, seizures             Learning/Language: Learning  Does the patient/guardian have any barriers to learning?: No barriers     Pain Screening    Pain Screening  Patient Currently in Pain: No    Functional Status         Social History:    Social History  Lives With:  (brother)  Type of Home: House  Home Layout: Two level       Prior Level of Function:    Independent        Current Level of Function:  Ind w/ reduced balance              OBJECTIVE EXAMINATION   Restrictions: n/a              Review of Systems:  Vision: Within Functional Limits  Hearing: Within functional limits  Follows Commands: Within Functional Limits    Gait: very WBOS w/ reduced arm swing BL and reduced trunk rotation       ROM:   Left AROM  Right AROM       WFL        WFL     Left Strength  Right Strength         Strength LLE  L Hip Flexion: 3+/5  L Hip ABduction: 3+/5  L Hip ADduction: 4-/5  L Knee Flexion: 4-/5  L Knee Extension: 4-/5  L Ankle Dorsiflexion: 4/5    Strength RLE  R Hip Flexion: 4-/5  R Hip ABduction: 4-/5  R Hip ADduction: 4/5  R Knee Flexion: 4/5  R Knee Extension: 4/5  R Ankle Dorsiflexion: 4/5     Additional Spinal Strength (if applicable):   impaired    Additional Finding(s) (if applicable): Additional Measures  Special Tests: TU.30 seconds. 5x STS: 16.31 seconds  Other: BAZZI/56       ASSESSMENT     Impression:Assessment: Pt is a 71-year-old male with history of seizures and CVA, L side affected with recent increased difficulty during gait and with balance. upon assessment, pt does present with impairments in balance, gait, BLE (L>R) weakness, and increased fall risk during functional mobility independence. Pt would benefit from skilled therapy interventions to address listed impairments, progress toward goal completion and improve ADL/IADL status. PT also warranted to reduce risk for further injury or decline.     Body Structures, Functions, Activity Limitations Requiring Skilled Therapeutic Intervention: Decreased functional mobility , Decreased endurance, Decreased posture, Decreased high-level IADLs, Decreased strength, Decreased balance    Statement of Medical Necessity: Physical Therapy is both indicated and medically necessary as outlined in the POC to increase the likelihood of meeting the functionally related goals stated below.      Patient's Activity Tolerance: Patient tolerated evaluation without incident        Patient's rehabilitation potential/prognosis is considered to be: Good    Factors which may impact rehabilitation potential include: None        GOALS     Patient Goal(s): improve balance  Short Term Goals Completed by Refer to LTG Goal Status                                                                   Long Term Goals Completed by 10 visits Goal Status   Pt will improve BAZZI to 36 or higher to show improved balance and reduced fall risk with mobility New   Pt will improve 5x STS to 14 seconds or less to show improved transfer independence New   Pt will improve TUG to 12 seconds or less to show improved gait New   Pt will improve RLE strength to gross 4+/5, hip 4/5 to aide in ADLs and balance New   Pt will improve LLE strength to gross 4/5, hip 4-/5 to aide in ADLs and balance                                        TREATMENT PLAN       Requires PT Follow-Up: Yes  Treatment Initiated : yes on 7/15  Specific Instructions for Next Treatment: nustep, BLE (L>R) strength, BALANCE AND GAIT    Pt. actively involved in establishing Plan of Care and Goals: Y  Patient/ Caregiver education and instruction:Goals, PT Role, Plan of Care, Evaluative findings, Home Exercise Program, Disease Specific Education, Anatomy of condition, Functional Mobility Training             Treatment may include any combination of the following: Strengthening, Balance training, ROM, Functional mobility training, Transfer training, ADL/Self-care training, Safety education & training, Patient/Caregiver education & training, Manual Therapy - Soft Tissue Mobilization, Neuromuscular re-education, Pain management, Stair training, Gait training, Modalities, Home exercise program, Therapeutic activities, IADL training     Frequency / Duration:  Patient to be seen 2 for 5 weeks       Eval Complexity:    Decision Making: Medium Complexity         Therapist Signature: Phu Orantes PT  , DPT  Date: 9/35/2787     I certify that the above Therapy Services are being furnished while the patient is under my care. I agree with the treatment plan and certify that this therapy is necessary. Physician's Signature:  ___________________________   Date:_______                                                                   Eric Banks DO        Physician Comments: _______________________________________________    Please sign and return to [address]. Please fax to the location listed below.  Fernando Velasquez for this referral!    [address] [facility] [address], [address] [address]  Dept: [phone]  Loc: [phone]       POC NOTE

## 2022-07-15 NOTE — FLOWSHEET NOTE
Outpatient Physical Therapy  [place]           [x] Phone: [phone]   Fax: [phone]  [facility]           [] Phone: [phone]   Fax: [phone]        Physical Therapy Daily Treatment Note  Date:  7/15/2022    Patient Name:  Ananda Rodrigues    :  1960  MRN: [id number]  Restrictions/Precautions: n/a  Diagnosis:    Diagnosis: CVA, idiopathic epilepsy & epileptic syndromes w/ seizures of localized onset  Date of Injury/Surgery:   Treatment Diagnosis:  impaired balance, impaired gait  Insurance/Certification information: [employer] [place] - BOMN  Referring Physician:   Estephania Simons DO   Next Doctor Visit:    Plan of care signed (Y/N):  sent 7/15  Outcome Measure: TU.30 seconds. 5x STS: 16.31 seconds. BAZZI/56  Visit# / total visits:  1 /10  Pain level: 0/10   Goals:     Patient goals: improve balance  Short term goals  Time Frame for Short term goals: Refer to [facility]         Long Term Goals  Time Frame for Long Term Goals: 10 visits  Pt will improve BAZZI to 36 or higher to show improved balance and reduced fall risk with mobility  Pt will improve 5x STS to 14 seconds or less to show improved transfer independence  Pt will improve TUG to 12 seconds or less to show improved gait  Pt will improve RLE strength to gross 4+/5, hip 4/5 to aide in ADLs and balance  Pt will improve LLE strength to gross 4/5, hip 4-/5 to aide in ADLs and balance      Summary of Evaluation:  Assessment: Pt is a 58-year-old male with history of seizures and CVA, L side affected with recent increased difficulty during gait and with balance. upon assessment, pt does present with impairments in balance, gait, BLE (L>R) weakness, and increased fall risk during functional mobility independence. Pt would benefit from skilled therapy interventions to address listed impairments, progress toward goal completion and improve ADL/IADL status. PT also warranted to reduce risk for further injury or decline.         Subjective:  See eval         Any changes in Ambulatory Summary Sheet? None        Objective:  See eval   COVID screening questions were asked and patient attested that there had been no contact or symptoms        Exercises: (No more than 4 columns)   Exercise/Equipment 7/15/22 #1 Date Date           WARM UP                     TABLE      marches X10 ea BL     LAQ X10 ea BL     Hip abd X10 ea RTB                    STANDING      STS X10 eccentric focus                                              PROPRIOCEPTION                                    MODALITIES                      Other Therapeutic Activities/Education:  HEP and importance of completion, POC and goals, anatomy and physiology related to condition    Home Exercise Program: Issued, practiced and pt demo ability to perform 7/15/2022      Manual Treatments:  none      Modalities:  none      Communication with other providers:  POC sent      Assessment:  Pt tolerated today's treatment without any adverse reactions or complications this date. End pain: same  Assessment: Pt is a 61-year-old male with history of seizures and CVA, L side affected with recent increased difficulty during gait and with balance. upon assessment, pt does present with impairments in balance, gait, BLE (L>R) weakness, and increased fall risk during functional mobility independence. Pt would benefit from skilled therapy interventions to address listed impairments, progress toward goal completion and improve ADL/IADL status. PT also warranted to reduce risk for further injury or decline.     Plan for Next Session:   Specific Instructions for Next Treatment: nustep, BLE (L>R) strength, BALANCE AND GAIT    Time In / Time Out:   1330 - 1409       Timed Code/Total Treatment Minutes:  44': 11' TE x1, 28' Eval x1      Next Progress Note due: 10th visit       Plan of Care Interventions:  [x] Therapeutic Exercise  [] Modalities:  [x] Therapeutic Activity     [] Ultrasound  [] Estim  [x] Gait Training      []

## 2022-07-15 NOTE — PLAN OF CARE
Outpatient Physical Therapy           [place]           [x] Phone: [phone]   Fax: [phone]  [facility]           [] Phone: [phone]   Fax: [phone]     To:  Jean Paul Liu,    From: Dhiraj Casey, PT, DPT     Patient: Judy Daily       : 1960  Diagnosis:  Diagnosis: CVA, idiopathic epilepsy & epileptic syndromes w/ seizures of localized onset  Treatment Diagnosis: impaired balance, impaired gait  Date: 7/15/2022    Physical Therapy Certification/Re-Certification Form  Dear Dr. Lorene Jiménez,  The following patient has been evaluated for physical therapy services and for therapy to continue, insurance requires physician review of the treatment plan initially and every 90 days. Please review the attached evaluation and/or summary of the patient's plan of care, and verify that you agree therapy should continue by signing the attached document and sending it back to our office. Assessment:    Assessment: Pt is a 70-year-old male with history of seizures and CVA, L side affected with recent increased difficulty during gait and with balance. upon assessment, pt does present with impairments in balance, gait, BLE (L>R) weakness, and increased fall risk during functional mobility independence. Pt would benefit from skilled therapy interventions to address listed impairments, progress toward goal completion and improve ADL/IADL status. PT also warranted to reduce risk for further injury or decline. Patient agrees with established plan of care and assisted in the development of their short term and long term goals. Patient had no adverse reaction with initial treatment and there are no barriers to learning. Learning preferences include demonstration, practice, and handouts. Patient expressed understanding of HEP and appears to be motivated to participate in an active PT program including compliance with HEP expectations.         Plan of Care/Treatment to date:  [x] Therapeutic Exercise  [] Modalities:  [x] Therapeutic Activity     [] Ultrasound  [] Electrical Stimulation  [x] Gait Training      [] Cervical Traction [] Lumbar Traction  [x] Neuromuscular Re-education    [] Cold/hotpack [] Iontophoresis   [x] Instruction in HEP      [] Vasopneumatic    [] Dry Needling  [x] Manual Therapy               [] Aquatic Therapy       Other:          Frequency/Duration:  # Days per week: [] 1 day # Weeks: [] 1 week [x] 5 weeks     [x] 2 days   [] 2 weeks [] 6 weeks     [] 3 days   [] 3 weeks [] 7 weeks     [] 4 days   [] 4 weeks [] 8 weeks         [] 9 weeks [] 10 weeks         [] 11 weeks [] 12 weeks    Rehab Potential/Progress: [] Excellent [x] Good [] Fair  [] Poor     Goals:    Patient goals: improve balance  Short term goals  Time Frame for Short term goals: Refer to [facility]                 Long Term Goals  Time Frame for Long Term Goals: 10 visits  Pt will improve BAZZI to 36 or higher to show improved balance and reduced fall risk with mobility  Pt will improve 5x STS to 14 seconds or less to show improved transfer independence  Pt will improve TUG to 12 seconds or less to show improved gait  Pt will improve RLE strength to gross 4+/5, hip 4/5 to aide in ADLs and balance  Pt will improve LLE strength to gross 4/5, hip 4-/5 to aide in ADLs and balance        Electronically signed by:  Phu Orantes PT, DPT, 7/15/2022, 4:18 PM        If you have any questions or concerns, please don't hesitate to call.   Thank you for your referral.      Physician Signature:________________________________Date:_________ TIME: _____  By signing above, therapists plan is approved by physician

## 2022-07-22 ENCOUNTER — HOSPITAL ENCOUNTER (OUTPATIENT)
Dept: PHYSICAL THERAPY | Age: 62
Setting detail: THERAPIES SERIES
Discharge: HOME | End: 2022-07-22
Payer: COMMERCIAL

## 2022-07-22 PROCEDURE — 97530 THERAPEUTIC ACTIVITIES: CPT

## 2022-07-22 PROCEDURE — 97110 THERAPEUTIC EXERCISES: CPT

## 2022-07-22 NOTE — FLOWSHEET NOTE
Outpatient Physical Therapy  [place]           [x] Phone: [phone]   Fax: [phone]  [place]           [] Phone: [phone]   Fax: [phone]        Physical Therapy Daily Treatment Note  Date:  2022    Patient Name:  Yayo Thomason    :  1960  MRN: [id number]  Restrictions/Precautions: n/a  Diagnosis:    Diagnosis: CVA, idiopathic epilepsy & epileptic syndromes w/ seizures of localized onset  Date of Injury/Surgery:   Treatment Diagnosis:  impaired balance, impaired gait  Insurance/Certification information: [employer] [place] - BOMN  Referring Physician:   Floridalma Pate DO   Next Doctor Visit:    Plan of care signed (Y/N):  sent 7/15  Outcome Measure: TU.30 seconds. 5x STS: 16.31 seconds. BAZZI/56  Visit# / total visits:  2 /10  Pain level: 0/10   Goals:     Patient goals: improve balance  Short term goals  Time Frame for Short term goals: Refer to [facility]         Long Term Goals  Time Frame for Long Term Goals: 10 visits  Pt will improve BAZZI to 36 or higher to show improved balance and reduced fall risk with mobility  Pt will improve 5x STS to 14 seconds or less to show improved transfer independence  Pt will improve TUG to 12 seconds or less to show improved gait  Pt will improve RLE strength to gross 4+/5, hip 4/5 to aide in ADLs and balance  Pt will improve LLE strength to gross 4/5, hip 4-/5 to aide in ADLs and balance      Summary of Evaluation:  Assessment: Pt is a 79-year-old male with history of seizures and CVA, L side affected with recent increased difficulty during gait and with balance. upon assessment, pt does present with impairments in balance, gait, BLE (L>R) weakness, and increased fall risk during functional mobility independence. Pt would benefit from skilled therapy interventions to address listed impairments, progress toward goal completion and improve ADL/IADL status. PT also warranted to reduce risk for further injury or decline.         Subjective:  Pt arrives to tx session reporting 0/10 pain. States compliance with HEP. Any changes in Ambulatory Summary Sheet? None        Objective:  See eval   COVID screening questions were asked and patient attested that there had been no contact or symptoms        Exercises: (No more than 4 columns)   Exercise/Equipment 7/15/22 #1 7/22/22 #2 Date           WARM UP      Nu-step  L6 5'          TABLE      marches X10 ea BL 2X10 ea BL    LAQ X10 ea BL 2X10 ea BL    Hip abd X10 ea RTB 2X10 ea RTB    SLR  2x10 ea LE    Bridging   2x10                    STANDING      STS X10 eccentric focus 2X10 eccentric focus                                             PROPRIOCEPTION      Air-ex marching   2x10    Tandem   next    Romberg   next                MODALITIES                      Other Therapeutic Activities/Education:  HEP and importance of completion, POC and goals, anatomy and physiology related to condition    Home Exercise Program: Issued, practiced and pt demo ability to perform 7/15/2022      Manual Treatments:  none      Modalities:  none      Communication with other providers:  POC sent      Assessment:  Pt tolerated today's treatment without any adverse reactions or complications this date. Tolerates added exercises well without issue; required increased clarification on exercise performance. Pt would benefit from skilled therapy interventions to address listed impairments, progress toward goal completion and improve ADL/IADL status. End pain: same      Assessment: Pt is a 71-year-old male with history of seizures and CVA, L side affected with recent increased difficulty during gait and with balance. upon assessment, pt does present with impairments in balance, gait, BLE (L>R) weakness, and increased fall risk during functional mobility independence. Pt would benefit from skilled therapy interventions to address listed impairments, progress toward goal completion and improve ADL/IADL status.  PT also warranted to reduce risk for further injury or decline.     Plan for Next Session:   Specific Instructions for Next Treatment: nustep, BLE (L>R) strength, BALANCE AND GAIT    Time In / Time Out:   1431 / 1430      Timed Code/Total Treatment Minutes:  45' / 45'     2 TE    1 TA      Next Progress Note due: 10th visit       Plan of Care Interventions:  [x] Therapeutic Exercise  [] Modalities:  [x] Therapeutic Activity     [] Ultrasound  [] Estim  [x] Gait Training      [] Cervical Traction [] Lumbar Traction  [x] Neuromuscular Re-education    [] Cold/hotpack [] Iontophoresis   [x] Instruction in HEP      [] Vasopneumatic   [] Dry Needling    [x] Manual Therapy               [] Aquatic Therapy              Electronically signed by:  Aaron Lopez PTA,     7/22/2022, 1:16 PM

## 2022-07-28 ENCOUNTER — HOSPITAL ENCOUNTER (OUTPATIENT)
Dept: PHYSICAL THERAPY | Age: 62
Setting detail: THERAPIES SERIES
Discharge: HOME | End: 2022-07-28
Payer: COMMERCIAL

## 2022-07-28 PROCEDURE — 97112 NEUROMUSCULAR REEDUCATION: CPT

## 2022-07-28 PROCEDURE — 97110 THERAPEUTIC EXERCISES: CPT

## 2022-07-28 NOTE — FLOWSHEET NOTE
arrives to tx session reporting 0/10 pain. He states that he hasn't had true falls in the last week, but is \"stumbling\" on his feet when he walks. Any changes in Ambulatory Summary Sheet? None        Objective:  See eval   COVID screening questions were asked and patient attested that there had been no contact or symptoms        Exercises: (No more than 4 columns)   Exercise/Equipment 7/15/22 #1 7/22/22 #2 7/28/22 #3           WARM UP      Nu-step  L6 5' L6 6'         TABLE      marches X10 ea BL 2X10 ea BL x10 ea   LAQ X10 ea BL 2X10 ea BL x10 ea   Hip abd X10 ea RTB 2X10 ea RTB    SLR  2x10 ea LE 2x10   Bridging   2x10  2x10   Toe raises      2x10         STANDING      STS X10 eccentric focus 2X10 eccentric focus 2X10 eccentric focus   Step over                                          PROPRIOCEPTION      Air-ex marching   2x10 No airex x10   Tandem   next Staggard 2x30\" ea LE leading   Romberg   next 2x30\"   Tandem walking      Gait training   13' Min A with cues for decrease URI   MODALITIES                      Other Therapeutic Activities/Education:  HEP and importance of completion, POC and goals, anatomy and physiology related to condition    Home Exercise Program: Issued, practiced and pt demo ability to perform 7/15/2022      Manual Treatments:  none      Modalities:  none      Communication with other providers:  POC sent      Assessment:  Pt tolerated today's treatment without any adverse reactions or complications this date. Tolerates added exercises well without issue; required increased clarification on exercise performance. Patient requires HHA with standing marching and experiences near LOB to the RT with Min A from therapist to correct. Pt would benefit from skilled therapy interventions to address listed impairments, progress toward goal completion and improve ADL/IADL status.   End pain: mm soreness      Assessment: Pt is a 62-year-old male with history of seizures and CVA, L side affected with recent increased difficulty during gait and with balance. upon assessment, pt does present with impairments in balance, gait, BLE (L>R) weakness, and increased fall risk during functional mobility independence. Pt would benefit from skilled therapy interventions to address listed impairments, progress toward goal completion and improve ADL/IADL status. PT also warranted to reduce risk for further injury or decline. Plan for Next Session:   Specific Instructions for Next Treatment: nustep, BLE (L>R) strength, BALANCE AND GAIT    Time In / Time Out:   1125/1208      Timed Code/Total Treatment Minutes:  37'   1 NR 15'  2 TE 28'      Next Progress Note due: 10th visit       Plan of Care Interventions:  [x] Therapeutic Exercise  [] Modalities:  [x] Therapeutic Activity     [] Ultrasound  [] Estim  [x] Gait Training      [] Cervical Traction [] Lumbar Traction  [x] Neuromuscular Re-education    [] Cold/hotpack [] Iontophoresis   [x] Instruction in HEP      [] Vasopneumatic   [] Dry Needling    [x] Manual Therapy               [] Aquatic Therapy              Electronically signed by:   Luke Beckett PTA,     7/28/2022, 11:24 AM

## 2022-08-02 ENCOUNTER — HOSPITAL ENCOUNTER (OUTPATIENT)
Dept: PHYSICAL THERAPY | Age: 62
Setting detail: THERAPIES SERIES
Discharge: HOME | End: 2022-08-02
Payer: COMMERCIAL

## 2022-08-02 PROCEDURE — 97112 NEUROMUSCULAR REEDUCATION: CPT

## 2022-08-02 PROCEDURE — 97110 THERAPEUTIC EXERCISES: CPT

## 2022-08-02 NOTE — FLOWSHEET NOTE
Outpatient Physical Therapy  [place]           [x] Phone: [phone]   Fax: [phone]  [facility]           [] Phone: [phone]   Fax: [phone]        Physical Therapy Daily Treatment Note  Date:  2022    Patient Name:  Rosales Paulson    :  1960  MRN: [id number]  Restrictions/Precautions: n/a  Diagnosis:    Diagnosis: CVA, idiopathic epilepsy & epileptic syndromes w/ seizures of localized onset  Date of Injury/Surgery:   Treatment Diagnosis:  impaired balance, impaired gait  Insurance/Certification information: [facility] [place] - BOMN  Referring Physician:   Romana Bhatt DO   Next Doctor Visit:    Plan of care signed (Y/N):  sent 7/15  Outcome Measure: TU.30 seconds. 5x STS: 16.31 seconds. BAZZI/56  Visit# / total visits:  4/10  Pain level: 0/10   Goals:     Patient goals: improve balance  Short term goals  Time Frame for Short term goals: Refer to [facility]         Long Term Goals  Time Frame for Long Term Goals: 10 visits  Pt will improve BAZZI to 36 or higher to show improved balance and reduced fall risk with mobility  Pt will improve 5x STS to 14 seconds or less to show improved transfer independence  Pt will improve TUG to 12 seconds or less to show improved gait  Pt will improve RLE strength to gross 4+/5, hip 4/5 to aide in ADLs and balance  Pt will improve LLE strength to gross 4/5, hip 4-/5 to aide in ADLs and balance      Summary of Evaluation:  Assessment: Pt is a 80-year-old male with history of seizures and CVA, L side affected with recent increased difficulty during gait and with balance. upon assessment, pt does present with impairments in balance, gait, BLE (L>R) weakness, and increased fall risk during functional mobility independence. Pt would benefit from skilled therapy interventions to address listed impairments, progress toward goal completion and improve ADL/IADL status. PT also warranted to reduce risk for further injury or decline.         Subjective:  Pt currently reports of no pain. Did have a fall last week while playing with his grandniece. States it was either fall or run her over. Denies any injury. Any changes in Ambulatory Summary Sheet? None        Objective:  See eval   COVID screening questions were asked and patient attested that there had been no contact or symptoms    Requires UE assist with balance activities - 1 finger support to whole hand support     Exercises: (No more than 4 columns)   Exercise/Equipment 7/15/22 #1 7/22/22 #2 7/28/22 #3 8/2/22 #4            WARM UP       Nu-step  L6 5' L6 6' L6 8'          TABLE       marches X10 ea BL 2X10 ea BL x10 ea 2# 10x R/L   LAQ X10 ea BL 2X10 ea BL x10 ea 2# 10x R/L   Hip abd X10 ea RTB 2X10 ea RTB     SLR  2x10 ea LE 2x10 2x10   Bridging   2x10  2x10 2x10   Toe raises      2x10 2x10          STANDING       STS X10 eccentric focus 2X10 eccentric focus 2X10 eccentric focus 2X10 eccentric focus   Step over                                                PROPRIOCEPTION       Air-ex marching   2x10 No airex x10 2x10 no airex   Tandem   next Staggard 2x30\" ea LE leading Tandem 30\"x2 R/L fwd   Romberg   next 2x30\" 1x30\", 1x30\" on airex   Tandem walking       Alt touch    6\" step 2x10   Gait training   13' Min A with cues for decrease URI    MODALITIES                         Other Therapeutic Activities/Education:  HEP and importance of completion, POC and goals, anatomy and physiology related to condition    Home Exercise Program: Issued, practiced and pt demo ability to perform 7/15/2022      Manual Treatments:  none      Modalities:  none      Communication with other providers:  POC sent      Assessment:  Pt tolerated today's treatment without any adverse reactions or complications this date. Pt would benefit from skilled therapy interventions to address listed impairments, progress toward goal completion and improve ADL/IADL status.   End pain: 0/10      Assessment: Pt is a 78-year-old male with history of seizures and CVA, L side affected with recent increased difficulty during gait and with balance. upon assessment, pt does present with impairments in balance, gait, BLE (L>R) weakness, and increased fall risk during functional mobility independence. Pt would benefit from skilled therapy interventions to address listed impairments, progress toward goal completion and improve ADL/IADL status. PT also warranted to reduce risk for further injury or decline.     Plan for Next Session:   Specific Instructions for Next Treatment: nustep, BLE (L>R) strength, BALANCE AND GAIT    Time In / Time Out:   1310/1353      Timed Code/Total Treatment Minutes:  37' / 37'   (2) TE, (1) NR      Next Progress Note due: 10th visit       Plan of Care Interventions:  [x] Therapeutic Exercise  [] Modalities:  [x] Therapeutic Activity     [] Ultrasound  [] Estim  [x] Gait Training      [] Cervical Traction [] Lumbar Traction  [x] Neuromuscular Re-education    [] Cold/hotpack [] Iontophoresis   [x] Instruction in HEP      [] Vasopneumatic   [] Dry Needling    [x] Manual Therapy               [] Aquatic Therapy              Electronically signed by:  Alonzo Harman PTA    8/2/2022, 1:01 PM

## 2022-08-05 ENCOUNTER — HOSPITAL ENCOUNTER (OUTPATIENT)
Dept: PHYSICAL THERAPY | Age: 62
Setting detail: THERAPIES SERIES
Discharge: HOME | End: 2022-08-05
Payer: COMMERCIAL

## 2022-08-05 PROCEDURE — 97110 THERAPEUTIC EXERCISES: CPT

## 2022-08-05 PROCEDURE — 97112 NEUROMUSCULAR REEDUCATION: CPT

## 2022-08-05 NOTE — FLOWSHEET NOTE
Outpatient Physical Therapy  [place]           [x] Phone: [phone]   Fax: [phone]  [facility]           [] Phone: [phone]   Fax: [phone]        Physical Therapy Daily Treatment Note  Date:  2022    Patient Name:  Rosales Paulson    :  1960  MRN: [id number]  Restrictions/Precautions: n/a  Diagnosis:    Diagnosis: CVA, idiopathic epilepsy & epileptic syndromes w/ seizures of localized onset  Date of Injury/Surgery:   Treatment Diagnosis:  impaired balance, impaired gait  Insurance/Certification information: Med [place] - BOMN  Referring Physician:   Romana Bhatt DO   Next Doctor Visit:    Plan of care signed (Y/N): Y  Outcome Measure: TU.30 seconds. 5x STS: 16.31 seconds. BAZZI/56  Visit# / total visits:  5/10  Pain level: 0/10   Goals:     Patient goals: improve balance  Short term goals  Time Frame for Short term goals: Refer to [facility]         Long Term Goals  Time Frame for Long Term Goals: 10 visits  Pt will improve BAZZI to 36 or higher to show improved balance and reduced fall risk with mobility  Pt will improve 5x STS to 14 seconds or less to show improved transfer independence  Pt will improve TUG to 12 seconds or less to show improved gait  Pt will improve RLE strength to gross 4+/5, hip 4/5 to aide in ADLs and balance  Pt will improve LLE strength to gross 4/5, hip 4-/5 to aide in ADLs and balance      Summary of Evaluation:  Assessment: Pt is a 58-year-old male with history of seizures and CVA, L side affected with recent increased difficulty during gait and with balance. upon assessment, pt does present with impairments in balance, gait, BLE (L>R) weakness, and increased fall risk during functional mobility independence. Pt would benefit from skilled therapy interventions to address listed impairments, progress toward goal completion and improve ADL/IADL status. PT also warranted to reduce risk for further injury or decline.         Subjective:  Pt is doing well this date. He is having no pain. Pt has had no falls since therapy start. Any changes in Ambulatory Summary Sheet? None      Objective:   COVID screening questions were asked and patient attested that there had been no contact or symptoms    Gait: WBOS w/ reduced trunk rotation    Exercises: (No more than 4 columns)   Exercise/Equipment 7/28/22 #3 8/2/22 #4 8/5/22 #5           WARM UP      Nu-step L6 6' L6 8' L6 x5'         TABLE      marches x10 ea 2# 10x R/L 2# 2x10 R/L   LAQ x10 ea 2# 10x R/L 2# 2x10 R/L   Hip abd      SLR 2x10 2x10    Bridging  2x10 2x10    Toe raises    2x10 2x10          STANDING      STS 2X10 eccentric focus 2X10 eccentric focus    Step over                                          PROPRIOCEPTION      Air-ex marching  No airex x10 2x10 no airex Marching floor 2x10 alt   Tandem  Staggard 2x30\" ea LE leading Tandem 30\"x2 R/L fwd Ea 30\" EO   Romberg  2x30\" 1x30\", 1x30\" on airex Floor EO/EC   Dynamic balance    Gait w/ Head turns    Backwards EO   Alt touch  6\" step 2x10    Gait training 13' Min A with cues for decrease URI     Cone taps   Alternating 2x10 BLEs   MODALITIES                      Other Therapeutic Activities/Education:  HEP and importance of completion    Home Exercise Program: Issued, practiced and pt demo ability to perform 7/15/2022    Manual Treatments:  none    Modalities:  none    Communication with other providers:  POC sent    Assessment:  Pt tolerated today's treatment without any adverse reactions or complications this date. Pt had difficulty this date with marches and cone taps due to SLS and coordination required. Would benefit from continued practice. Pt would continue to benefit from skilled therapy interventions to address remaining impairments, improve mobility and strength and progress toward goal completion while reducing risk for re-injury or further decline.   End pain: same    Plan for Next Session:   Specific Instructions for Next Treatment: ABBY stevens (L>R) strength, BALANCE AND GAIT    Time In / Time Out:   1310 -  1342 (pt late)    Timed Code/Total Treatment Minutes:  32': 20' NMR x1, 12' TE x1    Next Progress Note due: 10th visit     Plan of Care Interventions:  [x] Therapeutic Exercise  [] Modalities:  [x] Therapeutic Activity     [] Ultrasound  [] Estim  [x] Gait Training      [] Cervical Traction [] Lumbar Traction  [x] Neuromuscular Re-education    [] Cold/hotpack [] Iontophoresis   [x] Instruction in HEP      [] Vasopneumatic   [] Dry Needling    [x] Manual Therapy               [] Aquatic Therapy              Electronically signed by:  Jose Armando Almonte, PT, DPT   8/5/2022, 7:10 AM

## 2022-08-08 ENCOUNTER — HOSPITAL ENCOUNTER (OUTPATIENT)
Dept: PHYSICAL THERAPY | Age: 62
Setting detail: THERAPIES SERIES
Discharge: HOME | End: 2022-08-08
Payer: COMMERCIAL

## 2022-08-08 PROCEDURE — 97112 NEUROMUSCULAR REEDUCATION: CPT

## 2022-08-08 PROCEDURE — 97110 THERAPEUTIC EXERCISES: CPT

## 2022-08-08 NOTE — FLOWSHEET NOTE
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 5x STS: 16.31 seconds. BAZZI/56  Visit# / total visits:  5/10  Pain level: 0/10   Goals:     Patient goals: improve balance  Short term goals  Time Frame for Short term goals: Refer to [facility]         Long Term Goals  Time Frame for Long Term Goals: 10 visits  Pt will improve BAZZI to 36 or higher to show improved balance and reduced fall risk with mobility  Pt will improve 5x STS to 14 seconds or less to show improved transfer independence  Pt will improve TUG to 12 seconds or less to show improved gait  Pt will improve RLE strength to gross 4+/5, hip 4/5 to aide in ADLs and balance  Pt will improve LLE strength to gross 4/5, hip 4-/5 to aide in ADLs and balance      Summary of Evaluation:  Assessment: Pt is a 36-year-old male with history of seizures and CVA, L side affected with recent increased difficulty during gait and with balance. upon assessment, pt does present with impairments in balance, gait, BLE (L>R) weakness, and increased fall risk during functional mobility independence. Pt would benefit from skilled therapy interventions to address listed impairments, progress toward goal completion and improve ADL/IADL status. PT also warranted to reduce risk for further injury or decline.         Subjective:  Pt is doing well this date. Denies falls since therapy start. Any changes in Ambulatory Summary Sheet? None      Objective:   COVID screening questions were asked and patient attested that there had been no contact or symptoms    Gait: WBOS w/ reduced trunk rotation  Multiple attempts to correct exercise technique; pt unable to follow VC/TC's well this date. Exercises: (No more than 4 columns)   Exercise/Equipment 7/28/22 #3 8/2/22 #4 8/5/22 #5 8/8/22 #6            WARM UP       Nu-step L6 6' L6 8' L6 x5' L7 5'          TABLE       marches x10 ea 2# 10x R/L 2# 2x10 R/L 2# 2x10 R/L   LAQ x10 ea 2# 10x R/L 2# 2x10 R/L 2# 2x10 R/L   Hip abd       SLR 2x10 2x10  2# 2x10 R/L   Bridging  2x10 2x10  2x10   Toe raises    2x10 2x10            STANDING       STS 2X10 eccentric focus 2X10 eccentric focus     Step over                                                PROPRIOCEPTION       Air-ex marching  No airex x10 2x10 no airex Marching floor 2x10 alt Marching floor 2x10 alt   Tandem  Staggard 2x30\" ea LE leading Tandem 30\"x2 R/L fwd Ea 30\" EO Ea 30\"x2 EO   Romberg  2x30\" 1x30\", 1x30\" on airex Floor EO/EC Floor EO/EC   Dynamic balance    Gait w/ Head turns    Backwards EO Gait w/ Head turns    Backwards EO   Alt touch  6\" step 2x10     Gait training 13' Min A with cues for decrease URI      Cone taps   Alternating 2x10 BLEs Alternating 2x10 BLEs   MODALITIES                         Other Therapeutic Activities/Education:  HEP and importance of completion    Home Exercise Program: Issued, practiced and pt demo ability to perform 7/15/2022    Manual Treatments:  none    Modalities:  none    Communication with other providers:  POC sent    Assessment:  Pt tolerated today's treatment without any adverse reactions or complications this date. Pt tends to try and hurry through cone taps and marching causing further difficulty with balance/coordination. Intermittently able to follow VC's to slow self.  Pt would continue to benefit from skilled therapy interventions to address remaining impairments, improve mobility and strength and progress toward goal completion while reducing risk for re-injury or further decline.   End pain: same    Plan for Next Session:   Specific Instructions for Next Treatment: nustep, BLE (L>R) strength, BALANCE AND GAIT    Time In / Time Out:   1305 / 1345    Timed Code/Total Treatment Minutes:  36' / 40'    1 TE    2 NR    Next Progress Note due: 10th visit     Plan of Care Interventions:  [x] Therapeutic Exercise  [] Modalities:  [x] Therapeutic Activity     [] Ultrasound  [] Estim  [x] Gait Training      [] Cervical Traction [] Lumbar Traction  [x] Neuromuscular Re-education    [] Cold/hotpack [] Iontophoresis   [x] Instruction in HEP      [] Vasopneumatic   [] Dry Needling    [x] Manual Therapy               [] Aquatic Therapy              Electronically signed by:  Ruben Fregoso PTA    8/8/2022, 1:00 PM

## 2022-08-12 ENCOUNTER — HOSPITAL ENCOUNTER (OUTPATIENT)
Dept: PHYSICAL THERAPY | Age: 62
Setting detail: THERAPIES SERIES
Discharge: HOME | End: 2022-08-12
Payer: COMMERCIAL

## 2022-08-12 PROCEDURE — 97112 NEUROMUSCULAR REEDUCATION: CPT

## 2022-08-12 PROCEDURE — 97530 THERAPEUTIC ACTIVITIES: CPT

## 2022-08-12 NOTE — FLOWSHEET NOTE
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 5x STS: 16.31 seconds. BAZZI/56  Visit# / total visits:  7/10  Pain level: 0/10   Goals:     Patient goals: improve balance  Short term goals  Time Frame for Short term goals: Refer to [facility]         Long Term Goals  Time Frame for Long Term Goals: 10 visits  Pt will improve BAZZI to 36 or higher to show improved balance and reduced fall risk with mobility  Pt will improve 5x STS to 14 seconds or less to show improved transfer independence  Pt will improve TUG to 12 seconds or less to show improved gait  Pt will improve RLE strength to gross 4+/5, hip 4/5 to aide in ADLs and balance  Pt will improve LLE strength to gross 4/5, hip 4-/5 to aide in ADLs and balance      Summary of Evaluation:  Assessment: Pt is a 60-year-old male with history of seizures and CVA, L side affected with recent increased difficulty during gait and with balance. upon assessment, pt does present with impairments in balance, gait, BLE (L>R) weakness, and increased fall risk during functional mobility independence. Pt would benefit from skilled therapy interventions to address listed impairments, progress toward goal completion and improve ADL/IADL status. PT also warranted to reduce risk for further injury or decline.         Subjective:  Pt is doing well this date. Pt reports no pain today. Any changes in Ambulatory Summary Sheet? None      Objective:   COVID screening questions were asked and patient attested that there had been no contact or symptoms    Pt walked with wide URI when walking into clinic but when performing backwards walking and head turns had a narrow URI   Pt had LOB with airex exercises but had no falls. Exercises: (No more than 4 columns)   Exercise/Equipment 8/5/22 #5 8/8/22 #6 8/12/22 #7           WARM UP      Nu-step L6 x5' L7 5' L6 5'          TABLE      marches 2# 2x10 R/L 2# 2x10 R/L 2# x10 R/L    LAQ 2# 2x10 R/L 2# 2x10 R/L 2# 2x10 R/L    Hip abd      SLR  2# 2x10 R/L 2# x10 R/L    Bridging   2x10 2x10    Toe raises               STANDING      STS      Step over                                          PROPRIOCEPTION      Air-ex marching  Marching floor 2x10 alt Marching floor 2x10 alt Marching airex x10    Tandem  Ea 30\" EO Ea 30\"x2 EO 30\" EO   Romberg  Floor EO/EC Floor EO/EC Floor EO/EC   Dynamic balance  Gait w/ Head turns    Backwards EO Gait w/ Head turns    Backwards EO Gait w/ Head turns    Backwards EO   Alt touch      Gait training      Cone taps Alternating 2x10 BLEs Alternating 2x10 BLEs    MODALITIES                      Other Therapeutic Activities/Education:  HEP and importance of completion    Home Exercise Program: Issued, practiced and pt demo ability to perform 7/15/2022    Manual Treatments:  none    Modalities:  none    Communication with other providers:  POC sent    Assessment:  Pt tolerated today's treatment without any adverse reactions or complications this date. Pt tends to try and hurry through marching causing further difficulty with balance/coordination. Pt had difficulty following VCs to widen steps when walking backwards and often walked with a step to pattern  rather than a step through pattern.  Pt had LOB while performing EC romberg and marches on airex pad, but had no falls due to PT assist. Pt would continue to benefit from skilled therapy interventions to address remaining impairments, improve mobility and strength and progress toward goal completion while reducing risk for re-injury or further decline.   End pain: same    Plan for Next Session:   Specific Instructions for Next Treatment: nustep, BLE (L>R) strength, BALANCE AND GAIT    Time In / Time Out:   8375 - 1343     Timed Code/Total Treatment Minutes: 28': 16' TE x1, 12' NMR x1     Next Progress Note due: 10th visit     Plan of Care Interventions:  [x] Therapeutic Exercise  [] Modalities:  [x] Therapeutic Activity     [] Ultrasound  [] Estim  [x] Gait Training      [] Cervical Traction [] Lumbar Traction  [x] Neuromuscular Re-education    [] Cold/hotpack [] Iontophoresis   [x] Instruction in HEP      [] Vasopneumatic   [] Dry Needling    [x] Manual Therapy               [] Aquatic Therapy              Electronically signed by:  Km Benson , DEANDRE    8/12/2022, 1:13 PM

## 2022-08-15 ENCOUNTER — HOSPITAL ENCOUNTER (OUTPATIENT)
Dept: PHYSICAL THERAPY | Age: 62
Setting detail: THERAPIES SERIES
Discharge: HOME | End: 2022-08-15
Payer: COMMERCIAL

## 2022-08-15 PROCEDURE — 97112 NEUROMUSCULAR REEDUCATION: CPT

## 2022-08-15 NOTE — FLOWSHEET NOTE
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 5x STS: 16.31 seconds. BAZZI/56  Visit# / total visits:  8/10  Pain level: 7/10 R hip   Goals:     Patient goals: improve balance  Short term goals  Time Frame for Short term goals: Refer to [facility]         Long Term Goals  Time Frame for Long Term Goals: 10 visits  Pt will improve BAZZI to 36 or higher to show improved balance and reduced fall risk with mobility  Pt will improve 5x STS to 14 seconds or less to show improved transfer independence  Pt will improve TUG to 12 seconds or less to show improved gait  Pt will improve RLE strength to gross 4+/5, hip 4/5 to aide in ADLs and balance  Pt will improve LLE strength to gross 4/5, hip 4-/5 to aide in ADLs and balance      Summary of Evaluation:  Assessment: Pt is a 58-year-old male with history of seizures and CVA, L side affected with recent increased difficulty during gait and with balance. upon assessment, pt does present with impairments in balance, gait, BLE (L>R) weakness, and increased fall risk during functional mobility independence. Pt would benefit from skilled therapy interventions to address listed impairments, progress toward goal completion and improve ADL/IADL status. PT also warranted to reduce risk for further injury or decline.         Subjective:  Pt reports that his R hip is acting up on him; 7/10 pain, did not fall \"just happens every once in a while\". Any changes in Ambulatory Summary Sheet? None      Objective:   COVID screening questions were asked and patient attested that there had been no contact or symptoms    Pt enter clinic w/ wide URI   Struggled with cone taps and marching; no true LOB this date    Exercises: (No more than 4 columns)   Exercise/Equipment 8/5/22 #5 8/8/22 #6 8/12/22 #7 8/15/22 #8            WARM UP       Nu-step L6 x5' L7 5' L6 5'  L6 5'           TABLE       marches 2# 2x10 R/L 2# 2x10 R/L 2# x10 R/L     LAQ 2# 2x10 R/L 2# 2x10 R/L 2# 2x10 R/L     Hip abd       SLR  2# 2x10 R/L 2# x10 R/L     Bridging   2x10 2x10     Toe raises                 STANDING       STS       Step over                                                PROPRIOCEPTION       Air-ex marching  Marching floor 2x10 alt Marching floor 2x10 alt Marching airex x10  Marching floor 2x10 alt   Tandem  Ea 30\" EO Ea 30\"x2 EO 30\" EO 30\" EO  Walking 20' x2   Romberg  Floor EO/EC Floor EO/EC Floor EO/EC Floor EO/EC   Dynamic balance  Gait w/ Head turns    Backwards EO Gait w/ Head turns    Backwards EO Gait w/ Head turns    Backwards EO Gait w/ Head turns    Backwards EO   Alt touch       Gait training       Cone taps Alternating 2x10 BLEs Alternating 2x10 BLEs  Alternating 2x10 BLEs   MODALITIES                         Other Therapeutic Activities/Education:  HEP and importance of completion    Home Exercise Program: Issued, practiced and pt demo ability to perform 7/15/2022    Manual Treatments:  none    Modalities:  none    Communication with other providers:  POC sent    Assessment:  Pt tolerated today's treatment without any adverse reactions or complications this date. Continues to struggle with cone taps and marching; no true LOB's this date but several near misses. Pt able to correct some errors in balance with VC's.  Pt would continue to benefit from skilled therapy interventions to address remaining impairments, improve mobility and strength and progress toward goal completion while reducing risk for re-injury or further decline.   End pain: same    Plan for Next Session:   Specific Instructions for Next Treatment: nustep, BLE (L>R) strength, BALANCE AND GAIT    Time In / Time Out:   1305 / 1344     Timed Code/Total Treatment Minutes:  44' / 44'    3 NR    Next Progress Note due: 10th visit     Plan of Care Interventions:  [x] Therapeutic Exercise  [] Modalities:  [x] Therapeutic Activity     [] Ultrasound  [] Estim  [x] Gait Training      [] Cervical Traction [] Lumbar Traction  [x] Neuromuscular Re-education    [] Cold/hotpack [] Iontophoresis   [x] Instruction in HEP      [] Vasopneumatic   [] Dry Needling    [x] Manual Therapy               [] Aquatic Therapy              Electronically signed by:  Addi Vargas PTA      8/15/2022, 1:14 PM

## 2022-08-18 ENCOUNTER — HOSPITAL ENCOUNTER (OUTPATIENT)
Dept: PHYSICAL THERAPY | Age: 62
Discharge: HOME | End: 2022-08-18

## 2022-08-18 NOTE — FLOWSHEET NOTE
Physical Therapy  Cancellation/No-show Note  Patient Name:  Evelia Ortiz  :  1960   Date:  2022  Cancelled visits to date: 1  No-shows to date: 0    For today's appointment patient:  [x]  Cancelled  []  Rescheduled appointment  []  No-show     Reason given by patient:  [x]  Patient ill  []  Conflicting appointment  []  No transportation    []  Conflict with work  []  No reason given  []  Other:     Comments:  Covid    Electronically signed by:  Jackelyn Osorio, SPT

## 2022-08-31 DIAGNOSIS — G40.001 LOCALIZATION-RELATED (FOCAL) (PARTIAL) IDIOPATHIC EPILEPSY AND EPILEPTIC SYNDROMES WITH SEIZURES OF LOCALIZED ONSET, NOT INTRACTABLE, WITH STATUS EPILEPTICUS (HCC): Primary | ICD-10-CM

## 2022-08-31 RX ORDER — LACOSAMIDE 200 MG/1
200 TABLET ORAL 2 TIMES DAILY
Qty: 60 TABLET | Refills: 0 | Status: SHIPPED | OUTPATIENT
Start: 2022-08-31 | End: 2022-09-23 | Stop reason: SDUPTHER

## 2022-08-31 NOTE — TELEPHONE ENCOUNTER
Will give enough until his follow up in one month      Pt is requesting a refill for vimpat 200 mg be sent to [facility] on [address]. Pt had an appointment in June with Dr. Babs Storm however pt is going to be out of medication so refill will be routed to Dr. Babs Storm and Vignesh Damon.

## 2022-09-23 ENCOUNTER — OFFICE VISIT (OUTPATIENT)
Dept: NEUROLOGY | Age: 62
End: 2022-09-23
Payer: COMMERCIAL

## 2022-09-23 VITALS
HEART RATE: 78 BPM | SYSTOLIC BLOOD PRESSURE: 110 MMHG | WEIGHT: 113.4 LBS | DIASTOLIC BLOOD PRESSURE: 66 MMHG | OXYGEN SATURATION: 97 % | HEIGHT: 63 IN | BODY MASS INDEX: 20.09 KG/M2

## 2022-09-23 DIAGNOSIS — Z86.73 HISTORY OF ARTERIAL ISCHEMIC STROKE: ICD-10-CM

## 2022-09-23 DIAGNOSIS — G40.001 LOCALIZATION-RELATED (FOCAL) (PARTIAL) IDIOPATHIC EPILEPSY AND EPILEPTIC SYNDROMES WITH SEIZURES OF LOCALIZED ONSET, NOT INTRACTABLE, WITH STATUS EPILEPTICUS (HCC): Primary | ICD-10-CM

## 2022-09-23 PROCEDURE — 99214 OFFICE O/P EST MOD 30 MIN: CPT | Performed by: NURSE PRACTITIONER

## 2022-09-23 RX ORDER — DIVALPROEX SODIUM 500 MG/1
500 TABLET, DELAYED RELEASE ORAL 2 TIMES DAILY
Qty: 180 TABLET | Refills: 1 | Status: SHIPPED | OUTPATIENT
Start: 2022-09-23

## 2022-09-23 RX ORDER — LACOSAMIDE 200 MG/1
200 TABLET ORAL 2 TIMES DAILY
Qty: 180 TABLET | Refills: 0 | Status: SHIPPED | OUTPATIENT
Start: 2022-09-23 | End: 2022-12-22

## 2022-09-23 NOTE — PROGRESS NOTES
9/23/22    Dodie Kobi [facility]  1960    Chief Complaint   Patient presents with    Seizures     Patient presents for seizures, pt states no seizure activity since last visit, pt states he is well controlled       History of Present Illness  Jackie Snell is a 65 y.o. male presenting today for follow-up of:  Epilepsy. Patient remains on Vimpat 200 mg twice daily, Keppra 1500 mg twice daily and Depakote 500 mg twice daily. On last visit patient reported being seizure-free on this medication regimen. He does have history of CVA and remains on Plavix and statin for secondary stroke prevention. On last visit he was referred to balance therapy due to reports of feeling unstable on his feet. He continues to participate in balance therapy and feels this is helpful. He was encouraged to continue his nutritional supplements. Current Outpatient Medications   Medication Sig Dispense Refill    lacosamide (VIMPAT) 200 MG tablet Take 1 tablet by mouth 2 times daily for 30 days. 60 tablet 0    sertraline (ZOLOFT) 100 MG tablet Take 200 mg by mouth daily      levETIRAcetam (KEPPRA) 750 MG tablet Take 1,500 mg by mouth 2 times daily      atorvastatin (LIPITOR) 40 MG tablet Take 1 tablet by mouth daily 90 tablet 1    clopidogrel (PLAVIX) 75 MG tablet Take 1 tablet by mouth daily 90 tablet 1    tiZANidine (ZANAFLEX) 4 MG tablet Take 1 tablet by mouth 2 times daily as needed (muscle spasm) 180 tablet 1    donepezil (ARICEPT) 10 MG tablet Take 10 mg by mouth      lacosamide (VIMPAT) 200 MG tablet Take 200 mg by mouth.       divalproex (DEPAKOTE) 500 MG DR tablet Take 500 mg by mouth 2 times daily      Vitamin E 400 units TABS Take 400 Units by mouth daily       pyridoxine (B-6) 100 MG tablet Take 100 mg by mouth daily       Cyanocobalamin (B-12) 1000 MCG TABS Take 1,000 mcg by mouth 2 times daily       folic acid (FOLVITE) 816 MCG tablet Take 400 mcg by mouth daily       Omega-3 Fatty Acids (FISH OIL PO) Take 1 tablet by mouth daily right  Gait and Stance              Gait/Posture: Wide-based gait, unsteady  Motor/Coordination Exam              General: no bradykinesia, no tremors, no chorea, no athetosis, no myoclonus and no dyskinesia              Right Upper Extremity: normal motor strength, normal bulk and normal tone              Left Upper Extremity: normal motor strength, normal bulk and normal tone              Right Lower Extremity: normal motor strength, normal bulk and normal tone              Left Lower Extremity: normal motor strength, normal bulk and normal tone              Coordination: no drift, normal finger-to-nose, normal heel-to-shin and rapid alternating movements normal  Reflexes              Reflexes Right: 2/4 biceps, brachioradialis, patellar, and achilles               Reflexes Left: 2+/4 biceps, brachioradialis, patellar, and achilles               Plantar Reflex Right: response downgoing              Plantar Reflex Left: response downgoing              Hoffmans Reflex Right: absent              Hoffmans Reflex Left: absent              Frontal Release Signs: frontal release signs absent  Sensory              Sensation: normal light touch, diminished sensation to vibration bilateral lower extremities normal DSS, and no neglect     Lungs              No auditory wheezing  Skin              Inspection: no jaundice, no lesions, no rashes and no cyanosis    /66 (Site: Left Upper Arm, Position: Sitting, Cuff Size: Medium Adult)   Pulse 78   Ht 5' 2.5\" (1.588 m)   Wt 113 lb 6.4 oz (51.4 kg)   SpO2 97%   BMI 20.41 kg/m²     Assessment and Plan     Diagnosis Orders   1. Localization-related (focal) (partial) idiopathic epilepsy and epileptic syndromes with seizures of localized onset, not intractable, with status epilepticus (HCC)  lacosamide (VIMPAT) 200 MG tablet      2. History of arterial ischemic stroke          Geno Rucker is doing well and remains seizure-free on current medication regimen.   I will provide Geno Rucker

## 2022-12-01 DIAGNOSIS — G40.001 LOCALIZATION-RELATED (FOCAL) (PARTIAL) IDIOPATHIC EPILEPSY AND EPILEPTIC SYNDROMES WITH SEIZURES OF LOCALIZED ONSET, NOT INTRACTABLE, WITH STATUS EPILEPTICUS (HCC): ICD-10-CM

## 2022-12-02 NOTE — TELEPHONE ENCOUNTER
Patient is not due to come in until March 2023, patient needs a refill to at least hold them over until then.     Requested Prescriptions     Pending Prescriptions Disp Refills    lacosamide (VIMPAT) 200 MG tablet [Pharmacy Med Name: LACOSAMIDE 200 MG TABLET] 180 tablet      Sig: TAKE ONE TABLET BY MOUTH TWICE A DAY

## 2022-12-09 RX ORDER — LACOSAMIDE 200 MG/1
TABLET ORAL
Qty: 180 TABLET | Refills: 0 | Status: SHIPPED | OUTPATIENT
Start: 2022-12-09 | End: 2023-03-09

## 2023-03-02 ENCOUNTER — TELEPHONE (OUTPATIENT)
Dept: NEUROLOGY | Age: 63
End: 2023-03-02

## 2023-03-02 NOTE — TELEPHONE ENCOUNTER
Ashlyn Allen called and states Raffi Arias needs refills on all of his medications our office prescribes for him sent to [facility] on [address] [place] [address]. He is specifically asking for Plavix as he was told by our office at last visit to keep taking it. He is scheduled for a follow up 4/13/23.

## 2023-03-03 DIAGNOSIS — G40.001 LOCALIZATION-RELATED (FOCAL) (PARTIAL) IDIOPATHIC EPILEPSY AND EPILEPTIC SYNDROMES WITH SEIZURES OF LOCALIZED ONSET, NOT INTRACTABLE, WITH STATUS EPILEPTICUS (HCC): ICD-10-CM

## 2023-03-03 DIAGNOSIS — I63.311 CEREBROVASCULAR ACCIDENT (CVA) DUE TO THROMBOSIS OF RIGHT MIDDLE CEREBRAL ARTERY (HCC): ICD-10-CM

## 2023-03-03 RX ORDER — CLOPIDOGREL BISULFATE 75 MG/1
75 TABLET ORAL DAILY
Qty: 90 TABLET | Refills: 1 | Status: SHIPPED | OUTPATIENT
Start: 2023-03-03

## 2023-03-03 RX ORDER — ATORVASTATIN CALCIUM 40 MG/1
40 TABLET, FILM COATED ORAL DAILY
Qty: 90 TABLET | Refills: 1 | Status: SHIPPED | OUTPATIENT
Start: 2023-03-03

## 2023-03-03 RX ORDER — LACOSAMIDE 200 MG/1
200 TABLET ORAL 2 TIMES DAILY
Qty: 180 TABLET | Refills: 1 | Status: SHIPPED | OUTPATIENT
Start: 2023-03-03 | End: 2023-07-01

## 2023-03-03 RX ORDER — LEVETIRACETAM 750 MG/1
1500 TABLET ORAL 2 TIMES DAILY
Qty: 360 TABLET | Refills: 1 | Status: SHIPPED | OUTPATIENT
Start: 2023-03-03 | End: 2023-08-30

## 2023-03-03 RX ORDER — DIVALPROEX SODIUM 500 MG/1
500 TABLET, DELAYED RELEASE ORAL 2 TIMES DAILY
Qty: 180 TABLET | Refills: 1 | Status: SHIPPED | OUTPATIENT
Start: 2023-03-03

## 2023-06-26 DIAGNOSIS — G40.001 LOCALIZATION-RELATED (FOCAL) (PARTIAL) IDIOPATHIC EPILEPSY AND EPILEPTIC SYNDROMES WITH SEIZURES OF LOCALIZED ONSET, NOT INTRACTABLE, WITH STATUS EPILEPTICUS (HCC): ICD-10-CM

## 2023-06-26 RX ORDER — LACOSAMIDE 200 MG/1
200 TABLET ORAL 2 TIMES DAILY
Qty: 180 TABLET | Refills: 1 | Status: SHIPPED | OUTPATIENT
Start: 2023-06-26 | End: 2023-10-24

## 2023-08-03 ENCOUNTER — TELEPHONE (OUTPATIENT)
Dept: NEUROLOGY | Age: 63
End: 2023-08-03

## 2023-08-03 NOTE — TELEPHONE ENCOUNTER
Patient's brother called in stating they are confused because he needs a refill of the Aricept and it was not called in. Stated per the last visit note the PCP was managing this med and we are managing other scripts for the patient. Patient's brother asked if he should call PCP or have us start prescribing it, stated it looked like the provider discussed another memory med that we may prescribe and to contact PCP for now and discuss further at follow up in October about if we should manage. Patient's brother stated understanding.

## 2023-10-24 ENCOUNTER — OFFICE VISIT (OUTPATIENT)
Dept: NEUROLOGY | Age: 63
End: 2023-10-24
Payer: MEDICARE

## 2023-10-24 VITALS
OXYGEN SATURATION: 99 % | WEIGHT: 99.4 LBS | HEART RATE: 70 BPM | BODY MASS INDEX: 17.61 KG/M2 | DIASTOLIC BLOOD PRESSURE: 72 MMHG | SYSTOLIC BLOOD PRESSURE: 126 MMHG | HEIGHT: 63 IN

## 2023-10-24 DIAGNOSIS — I63.311 CEREBROVASCULAR ACCIDENT (CVA) DUE TO THROMBOSIS OF RIGHT MIDDLE CEREBRAL ARTERY (HCC): ICD-10-CM

## 2023-10-24 DIAGNOSIS — G40.001 LOCALIZATION-RELATED (FOCAL) (PARTIAL) IDIOPATHIC EPILEPSY AND EPILEPTIC SYNDROMES WITH SEIZURES OF LOCALIZED ONSET, NOT INTRACTABLE, WITH STATUS EPILEPTICUS (HCC): ICD-10-CM

## 2023-10-24 PROCEDURE — 99214 OFFICE O/P EST MOD 30 MIN: CPT | Performed by: NURSE PRACTITIONER

## 2023-10-24 RX ORDER — LACOSAMIDE 200 MG/1
200 TABLET ORAL 2 TIMES DAILY
Qty: 180 TABLET | Refills: 1 | Status: SHIPPED | OUTPATIENT
Start: 2023-10-24 | End: 2024-06-03

## 2023-10-24 RX ORDER — SILDENAFIL 100 MG/1
100 TABLET, FILM COATED ORAL PRN
COMMUNITY
Start: 2023-07-31

## 2023-10-24 RX ORDER — LEVETIRACETAM 750 MG/1
1500 TABLET ORAL 2 TIMES DAILY
Qty: 360 TABLET | Refills: 2 | Status: SHIPPED | OUTPATIENT
Start: 2023-10-24 | End: 2024-07-04

## 2023-10-24 RX ORDER — DIVALPROEX SODIUM 500 MG/1
500 TABLET, DELAYED RELEASE ORAL 2 TIMES DAILY
Qty: 180 TABLET | Refills: 2 | Status: SHIPPED | OUTPATIENT
Start: 2023-10-24

## 2023-10-24 RX ORDER — ATORVASTATIN CALCIUM 40 MG/1
40 TABLET, FILM COATED ORAL DAILY
Qty: 90 TABLET | Refills: 2 | Status: SHIPPED | OUTPATIENT
Start: 2023-10-24

## 2023-10-24 RX ORDER — CLOPIDOGREL BISULFATE 75 MG/1
75 TABLET ORAL DAILY
Qty: 90 TABLET | Refills: 2 | Status: SHIPPED | OUTPATIENT
Start: 2023-10-24

## 2023-12-07 ENCOUNTER — APPOINTMENT (OUTPATIENT)
Dept: CT IMAGING | Age: 63
End: 2023-12-07
Payer: MEDICARE

## 2023-12-07 ENCOUNTER — HOSPITAL ENCOUNTER (EMERGENCY)
Age: 63
Discharge: ANOTHER HOSPITAL | End: 2023-12-07
Attending: EMERGENCY MEDICINE
Payer: MEDICARE

## 2023-12-07 VITALS
RESPIRATION RATE: 16 BRPM | OXYGEN SATURATION: 100 % | HEART RATE: 67 BPM | SYSTOLIC BLOOD PRESSURE: 129 MMHG | WEIGHT: 99 LBS | DIASTOLIC BLOOD PRESSURE: 85 MMHG | TEMPERATURE: 97.8 F | BODY MASS INDEX: 17.82 KG/M2

## 2023-12-07 DIAGNOSIS — R41.82 ALTERED MENTAL STATUS, UNSPECIFIED ALTERED MENTAL STATUS TYPE: ICD-10-CM

## 2023-12-07 DIAGNOSIS — S01.91XA COMPLEX LACERATION OF FACE, INITIAL ENCOUNTER: ICD-10-CM

## 2023-12-07 DIAGNOSIS — S09.90XA CLOSED HEAD INJURY, INITIAL ENCOUNTER: Primary | ICD-10-CM

## 2023-12-07 LAB
ALBUMIN SERPL-MCNC: 3.7 GM/DL (ref 3.4–5)
ALCOHOL SCREEN SERUM: 0.09 %WT/VOL
ALP BLD-CCNC: 113 IU/L (ref 40–129)
ALT SERPL-CCNC: 8 U/L (ref 10–40)
ANION GAP SERPL CALCULATED.3IONS-SCNC: 14 MMOL/L (ref 4–16)
AST SERPL-CCNC: 18 IU/L (ref 15–37)
BASOPHILS ABSOLUTE: 0 K/CU MM
BASOPHILS RELATIVE PERCENT: 0.4 % (ref 0–1)
BILIRUB SERPL-MCNC: 0.1 MG/DL (ref 0–1)
BUN SERPL-MCNC: 16 MG/DL (ref 6–23)
CALCIUM SERPL-MCNC: 8.2 MG/DL (ref 8.3–10.6)
CHLORIDE BLD-SCNC: 98 MMOL/L (ref 99–110)
CO2: 26 MMOL/L (ref 21–32)
CREAT SERPL-MCNC: 0.8 MG/DL (ref 0.9–1.3)
DIFFERENTIAL TYPE: ABNORMAL
EOSINOPHILS ABSOLUTE: 0.1 K/CU MM
EOSINOPHILS RELATIVE PERCENT: 0.7 % (ref 0–3)
GFR SERPL CREATININE-BSD FRML MDRD: >60 ML/MIN/1.73M2
GLUCOSE SERPL-MCNC: 80 MG/DL (ref 70–99)
HCT VFR BLD CALC: 32.2 % (ref 42–52)
HEMOGLOBIN: 10.4 GM/DL (ref 13.5–18)
IMMATURE NEUTROPHIL %: 0.4 % (ref 0–0.43)
LYMPHOCYTES ABSOLUTE: 2.3 K/CU MM
LYMPHOCYTES RELATIVE PERCENT: 23 % (ref 24–44)
MCH RBC QN AUTO: 33.2 PG (ref 27–31)
MCHC RBC AUTO-ENTMCNC: 32.3 % (ref 32–36)
MCV RBC AUTO: 102.9 FL (ref 78–100)
MONOCYTES ABSOLUTE: 0.8 K/CU MM
MONOCYTES RELATIVE PERCENT: 7.6 % (ref 0–4)
NUCLEATED RBC %: 0 %
PDW BLD-RTO: 14.1 % (ref 11.7–14.9)
PLATELET # BLD: 254 K/CU MM (ref 140–440)
PMV BLD AUTO: 9.1 FL (ref 7.5–11.1)
POTASSIUM SERPL-SCNC: 4.1 MMOL/L (ref 3.5–5.1)
RBC # BLD: 3.13 M/CU MM (ref 4.6–6.2)
SEGMENTED NEUTROPHILS ABSOLUTE COUNT: 6.8 K/CU MM
SEGMENTED NEUTROPHILS RELATIVE PERCENT: 67.9 % (ref 36–66)
SODIUM BLD-SCNC: 138 MMOL/L (ref 135–145)
TOTAL CK: 82 IU/L (ref 38–174)
TOTAL IMMATURE NEUTOROPHIL: 0.04 K/CU MM
TOTAL NUCLEATED RBC: 0 K/CU MM
TOTAL PROTEIN: 6.4 GM/DL (ref 6.4–8.2)
TROPONIN, HIGH SENSITIVITY: 31 NG/L (ref 0–22)
WBC # BLD: 10 K/CU MM (ref 4–10.5)

## 2023-12-07 PROCEDURE — 70450 CT HEAD/BRAIN W/O DYE: CPT

## 2023-12-07 PROCEDURE — 84484 ASSAY OF TROPONIN QUANT: CPT

## 2023-12-07 PROCEDURE — 74177 CT ABD & PELVIS W/CONTRAST: CPT

## 2023-12-07 PROCEDURE — 80053 COMPREHEN METABOLIC PANEL: CPT

## 2023-12-07 PROCEDURE — 85025 COMPLETE CBC W/AUTO DIFF WBC: CPT

## 2023-12-07 PROCEDURE — 99285 EMERGENCY DEPT VISIT HI MDM: CPT

## 2023-12-07 PROCEDURE — 6360000004 HC RX CONTRAST MEDICATION: Performed by: EMERGENCY MEDICINE

## 2023-12-07 PROCEDURE — 72125 CT NECK SPINE W/O DYE: CPT

## 2023-12-07 PROCEDURE — 70486 CT MAXILLOFACIAL W/O DYE: CPT

## 2023-12-07 PROCEDURE — 71260 CT THORAX DX C+: CPT

## 2023-12-07 PROCEDURE — 82550 ASSAY OF CK (CPK): CPT

## 2023-12-07 PROCEDURE — G0480 DRUG TEST DEF 1-7 CLASSES: HCPCS

## 2023-12-07 PROCEDURE — 76376 3D RENDER W/INTRP POSTPROCES: CPT

## 2023-12-07 PROCEDURE — 2580000003 HC RX 258: Performed by: EMERGENCY MEDICINE

## 2023-12-07 RX ORDER — SODIUM CHLORIDE 9 MG/ML
INJECTION, SOLUTION INTRAVENOUS CONTINUOUS
Status: DISCONTINUED | OUTPATIENT
Start: 2023-12-07 | End: 2023-12-07 | Stop reason: HOSPADM

## 2023-12-07 RX ORDER — SODIUM CHLORIDE 0.9 % (FLUSH) 0.9 %
5-40 SYRINGE (ML) INJECTION 2 TIMES DAILY
Status: DISCONTINUED | OUTPATIENT
Start: 2023-12-07 | End: 2023-12-07 | Stop reason: HOSPADM

## 2023-12-07 RX ADMIN — SODIUM CHLORIDE: 9 INJECTION, SOLUTION INTRAVENOUS at 05:25

## 2023-12-07 RX ADMIN — IOPAMIDOL 75 ML: 755 INJECTION, SOLUTION INTRAVENOUS at 06:57

## 2023-12-07 NOTE — ED PROVIDER NOTES
Triage Chief Complaint:   Van Rashid Pals down 10 stairs)    [place]:  Wynette Buerger is a 59 y.o. male that presents via EMS for a fall. Patient reportedly had gotten to the top of a flight of stairs when he lost his balance and fell backward. Patient does not feel that he lost consciousness. He is complaining of right-sided headache. Patient does have a history of seizures and prior CVA. He is on Plavix. EMS reports that he was hypoxic on arrival and was placed on nonrebreather. He was placed in a c-collar on a backboard and transported to the hospital.  Maribell Aguilar did activate care flight prior to arrival given patient's head and facial trauma. On arrival, the patient answers most questions appropriately but appears mildly confused. Denies any chest or abdominal pain, difficulty breathing, motor or sensory deficits or vision changes. ROS:  At least 10 systems reviewed and otherwise acutely negative except as in the [address] Past Medical History:   Diagnosis Date    Cerebral artery occlusion with cerebral infarction ([address])     Hypertension     Seizures ([address])      No past surgical history on file.   Family History   Problem Relation Age of Onset    Stroke Sister     Heart Disease Brother     Heart Failure Mother     Diabetes Mother     Heart Failure Father      Social History     Socioeconomic History    Marital status: Single     Spouse name: Not on file    Number of children: Not on file    Years of education: Not on file    Highest education level: Not on file   Occupational History    Not on file   Tobacco Use    Smoking status: Former     Packs/day: 0.25     Years: 36.00     Additional pack years: 0.00     Total pack years: 9.00     Types: Cigarettes     Start date: 0     Quit date: 2017     Years since quittin.9    Smokeless tobacco: Never   Vaping Use    Vaping Use: Never used   Substance and Sexual Activity    Alcohol use: Yes    Drug use: Yes     Types: Marijuana Viri Mcqueen     Comment: OCCASIONAL electrocardiograms and rhythm strips, reviewing laboratory results, discussing the patient's condition and management with the patient/family. Time billed does not include time for procedures. History from : Patient and EMS    Limitations to history : Altered Mental Status    Patient was given the following medications:  Medications   0.9 % sodium chloride infusion ( IntraVENous New Bag 12/7/23 0525)   sodium chloride flush 0.9 % injection 5-40 mL (has no administration in time range)   iopamidol (ISOVUE-370) 76 % injection 75 mL (has no administration in time range)       Independent Imaging Interpretation by me:     EKG (if obtained): (All EKG's are interpreted by myself in the absence of a cardiologist)     Chronic conditions affecting care: CVA on plavix    Social Determinants : None    Disposition Considerations (tests considered but not done, Shared Decision Making, Pt Expectation of Test or Tx.):       I am the Primary Clinician of Record. Clinical Impression:  1. Closed head injury, initial encounter    2. Complex laceration of face, initial encounter    3.  Altered mental status, unspecified altered mental status type      (Please note that portions of this note may have been completed with a voice recognition program. Efforts were made to edit the dictations but occasionally words are mis-transcribed.)    MD Suad Moncada MD  12/07/23 8649

## 2024-01-12 NOTE — FLOWSHEET NOTE
enhanced. Good attention to the left overall for reading tasks. He indicated that his OT and myself have trained him to [facility] to his left side\". During reading of magazine articles, Macey Rodriguez demonstrated attention to the left side of the page with no use of the guide when reading slightly larger print for the bylines. He employed his index finger to guide himself for the left margin and the correct line. Accuracy for attention to left margin and correct line of print was 90%. Good attention for the left side during reading of a magazine. He read the small print with intact visual tracking with 98% consistency and attention to the left margin with 100% consistency. Attention to the left margin for reading of small print was consistent across 28 lines of print varying in size from large to small. However, he demonstrated skipping of lines of print for three of the 28 lines when he did not use his finger to facilitate reading each line. When he employed his finger as a guide, he accurately moved from line to line of print. Goal 4: Will demonstrate intact divided attention for completion of daily activities Not yet addressed Visual selective attention- 93% accurate across 70 trials for task in which he needed to noted whether the print was \"bold\" vs \"light\". Not addressed today Completed trail task with minimal cues. This involved dividing his attention between the letters and the numbers as he connected them by a written line. Divided attention trail activity- Brent completed this task with increased rate and no cues as compared to earlier this week. Goal 5: Auditory comprehension of 6-8 sentence passages will improve to 80% or better consistency  Auditory comprehension of 6 sentence passages was accurate with 92% accuracy based upon responses to [facility] and yes/no questions. Not addressed today Not addressed today Auditory comprehension of 8-10 sentence passages was 93% accurate based upon responses to [facility] questions. Yes - the patient is able to be screened

## 2024-05-28 NOTE — PROGRESS NOTES
Post-Discharge Transitional Care Management Services      Pattie Olson   YOB: 1960    Date of Office Visit:  8/3/2018  Date of Hospital Admission: 7/24/18  Date of Hospital Discharge: 7/26/18  Readmission Risk Score(high >=14%.  Medium >=10%):Readmission Risk Score: 13    Care management risk score Rising risk (score 2-5) and Complex Care (Scores >=6): 4     Non face to face  following discharge, date last encounter closed (first attempt may have been earlier): *No documented post hospital discharge outreach found in the last 14 days *No documented post hospital discharge outreach found in the last 14 days    Call initiated 2 business days of discharge: *No response recorded in the last 14 days     Patient Active Problem List   Diagnosis    Delirium tremens ([address]    Hyperbilirubinemia    Former smoker    Acute encephalopathy    Gait disturbance    Hemiparesis of left nondominant side due to cerebral infarction ([address]    Alcoholism ([address]    Cerebrovascular accident (CVA) due to thrombosis of right middle cerebral artery ([address]    Vision changes       Allergies   Allergen Reactions    Shellfish-Derived Products        Medications listed as ordered at the time of discharge from [facility]   Brent Degroot   [place] Medication Instructions [facility]:    Printed on:08/03/18 4178   Medication Information                      aspirin 81 MG chewable tablet  Take 1 tablet by mouth daily             atorvastatin (LIPITOR) 40 MG tablet  Take 1 tablet by mouth nightly             clopidogrel (PLAVIX) 75 MG tablet  Take 1 tablet by mouth daily             Cyanocobalamin (B-12) 2000 MCG TABS  Take 1 tablet by mouth daily 07/24/18 Takes OTC             donepezil (ARICEPT) 5 MG tablet  Take 5 mg by mouth nightly             FLUoxetine (PROZAC) 10 MG capsule  Take 1 capsule by mouth daily             FOLIC ACID PO  Take 2.5 mg by mouth daily 07/24/18 Takes OTC             levETIRAcetam (KEPPRA) 500 MG tablet  Take 500 mg by mouth 2 times daily              Multiple Vitamins-Minerals (THERAPEUTIC MULTIVITAMIN-MINERALS) tablet  Take 1 tablet by mouth daily 07/24/18 Takes OTC             Omega-3 Fatty Acids (FISH OIL PO)  Take 1 tablet by mouth daily             pyridoxine (B-6) 25 MG tablet  Take 25 mg by mouth daily 07/24/18 Takes OTC             Vitamin E 200 units TABS  Take 200 Units by mouth daily 07/24/18 Takes OTC                   Medications marked \"taking\" at this time  Outpatient Prescriptions Marked as Taking for the 8/3/18 encounter (Office Visit) with Kenzie Fallon MD   Medication Sig Dispense Refill    clopidogrel (PLAVIX) 75 MG tablet Take 1 tablet by mouth daily 30 tablet 3    Vitamin E 200 units TABS Take 200 Units by mouth daily 07/24/18 Takes OTC      levETIRAcetam (KEPPRA) 500 MG tablet Take 500 mg by mouth 2 times daily       atorvastatin (LIPITOR) 40 MG tablet Take 1 tablet by mouth nightly 90 tablet 0    FLUoxetine (PROZAC) 10 MG capsule Take 1 capsule by mouth daily 90 capsule 0    donepezil (ARICEPT) 5 MG tablet Take 5 mg by mouth nightly      pyridoxine (B-6) 25 MG tablet Take 25 mg by mouth daily 07/24/18 Takes OTC      Cyanocobalamin (B-12) 2000 MCG TABS Take 1 tablet by mouth daily 07/24/18 Takes OTC      FOLIC ACID PO Take 2.5 mg by mouth daily 07/24/18 Takes OTC      Omega-3 Fatty Acids (FISH OIL PO) Take 1 tablet by mouth daily      Multiple Vitamins-Minerals (THERAPEUTIC MULTIVITAMIN-MINERALS) tablet Take 1 tablet by mouth daily 07/24/18 Takes OTC      aspirin 81 MG chewable tablet Take 1 tablet by mouth daily (Patient taking differently: Take 81 mg by mouth nightly 07/24/18 Takes OTC) 30 tablet 3        Medications patient taking as of now reconciled against medications ordered at time of hospital discharge: Yes    Chief Complaint   Patient presents with    Transient Ischemic Attack       HPI    Inpatient course: Discharge summary reviewed- see chart.     Interval Name band; history/Current status: Since Discharge he has not had any further symptoms. He had complete resolution of symptoms prior to discharge. He saw optho after discharge who felt his symptoms were consistent with an ocular migraine. He is also here for a 3 month follow up on his previous CVA and alcohol use. CVA - no changes. Still taking lipitor. Tolerates it well. Taking aspirin. Plavix was added in the hospital because of presumed TIA. Alcohol use. He reports drinking a \"few\" beers 2-3 days a week and sometimes more. He has been getting his house ready to sell and this is why he is drinking more. Review of Systems   Respiratory: Negative for shortness of breath. Cardiovascular: Negative for chest pain. Vitals:    08/03/18 0910   BP: 132/62   Site: Left Arm   Position: Sitting   Cuff Size: Medium Adult   Pulse: 97   Temp: 96.1 °F (35.6 °C)   TempSrc: Temporal   SpO2: 97%   Weight: 122 lb 3.2 oz (55.4 kg)     Body mass index is 20.98 kg/m². Wt Readings from Last 3 Encounters:   08/03/18 122 lb 3.2 oz (55.4 kg)   07/26/18 130 lb 14.4 oz (59.4 kg)   05/07/18 125 lb 9.6 oz (57 kg)     BP Readings from Last 3 Encounters:   08/03/18 132/62   07/26/18 110/77   05/07/18 (!) 144/84       Physical Exam   Constitutional: He is oriented to person, place, and time. He appears well-developed and well-nourished. No distress. Cardiovascular: Normal rate and regular rhythm. No murmur heard. Pulmonary/Chest: Effort normal. He has no wheezes. He has no rales. Abdominal: Soft. Bowel sounds are normal. There is no tenderness. Musculoskeletal: He exhibits no edema. Neurological: He is alert and oriented to person, place, and time. Psychiatric: He has a normal mood and affect. Nursing note and vitals reviewed. Assessment/Plan:  Regine Forrester was seen today for transient ischemic attack.     Diagnoses and all orders for this visit:    Vision changes    Cerebrovascular accident (CVA) due to thrombosis of right middle cerebral artery ([facility]    Alcoholism [facility])    Former smoker          Medical Decision Making: low complexity     1. May have been an occular migraine. He will treat with OTC meds if it recurs. If he gets arm weakness, or other signs of CVA, he will return to the ER. Agree with at least 6 months of plavix therapy. 2. Continue secondary prevention with lipitor. Labs due in 6 months. Continue Aspirin. 3. Encouraged abstinence, but I don't see him doing that. I think his brother that he lives with is not the best influence in this matter. 4. Continues to be smoke free. Follow up 6 months: CVA, alcohol.

## 2024-08-18 DIAGNOSIS — G40.001 LOCALIZATION-RELATED (FOCAL) (PARTIAL) IDIOPATHIC EPILEPSY AND EPILEPTIC SYNDROMES WITH SEIZURES OF LOCALIZED ONSET, NOT INTRACTABLE, WITH STATUS EPILEPTICUS (HCC): ICD-10-CM

## 2024-08-19 RX ORDER — LEVETIRACETAM 750 MG/1
1500 TABLET ORAL 2 TIMES DAILY
Qty: 360 TABLET | Refills: 2 | Status: SHIPPED | OUTPATIENT
Start: 2024-08-19

## 2024-08-19 NOTE — TELEPHONE ENCOUNTER
Last ov 10/24/23, next ov 10/23/24. Rx pending.     Requested Prescriptions     Pending Prescriptions Disp Refills    levETIRAcetam (KEPPRA) 750 MG tablet [Pharmacy Med Name: levETIRAcetam 750 MG TABLET] 360 tablet 2     Sig: TAKE 2 TABLETS BY MOUTH TWICE A DAY

## 2024-09-02 DIAGNOSIS — G40.001 LOCALIZATION-RELATED (FOCAL) (PARTIAL) IDIOPATHIC EPILEPSY AND EPILEPTIC SYNDROMES WITH SEIZURES OF LOCALIZED ONSET, NOT INTRACTABLE, WITH STATUS EPILEPTICUS (HCC): Primary | ICD-10-CM

## 2024-09-03 RX ORDER — LACOSAMIDE 200 MG/1
200 TABLET ORAL 2 TIMES DAILY
Qty: 180 TABLET | Refills: 2 | Status: SHIPPED | OUTPATIENT
Start: 2024-09-03 | End: 2025-03-04

## 2024-09-03 NOTE — TELEPHONE ENCOUNTER
Last Visit: 10/24/23    Next Visit: 10/23/24    Rx is pending.     Requested Prescriptions     Pending Prescriptions Disp Refills    lacosamide (VIMPAT) 200 MG tablet [Pharmacy Med Name: LACOSAMIDE 200 MG TABLET] 180 tablet      Sig: Take 1 tablet by mouth 2 times daily.

## 2024-11-18 ENCOUNTER — OFFICE VISIT (OUTPATIENT)
Dept: NEUROLOGY | Age: 64
End: 2024-11-18
Payer: MEDICARE

## 2024-11-18 VITALS
DIASTOLIC BLOOD PRESSURE: 82 MMHG | WEIGHT: 103.4 LBS | HEART RATE: 75 BPM | OXYGEN SATURATION: 97 % | SYSTOLIC BLOOD PRESSURE: 122 MMHG | BODY MASS INDEX: 18.61 KG/M2

## 2024-11-18 DIAGNOSIS — I63.311 CEREBROVASCULAR ACCIDENT (CVA) DUE TO THROMBOSIS OF RIGHT MIDDLE CEREBRAL ARTERY (HCC): ICD-10-CM

## 2024-11-18 DIAGNOSIS — G40.001 LOCALIZATION-RELATED (FOCAL) (PARTIAL) IDIOPATHIC EPILEPSY AND EPILEPTIC SYNDROMES WITH SEIZURES OF LOCALIZED ONSET, NOT INTRACTABLE, WITH STATUS EPILEPTICUS (HCC): ICD-10-CM

## 2024-11-18 PROCEDURE — 99214 OFFICE O/P EST MOD 30 MIN: CPT | Performed by: NURSE PRACTITIONER

## 2024-11-18 RX ORDER — DONEPEZIL HYDROCHLORIDE 10 MG/1
10 TABLET, FILM COATED ORAL NIGHTLY
Qty: 90 TABLET | Refills: 3 | Status: SHIPPED | OUTPATIENT
Start: 2024-11-18

## 2024-11-18 RX ORDER — MEMANTINE HYDROCHLORIDE 5 MG/1
TABLET ORAL
Qty: 42 TABLET | Refills: 0 | Status: SHIPPED | OUTPATIENT
Start: 2024-11-18

## 2024-11-18 RX ORDER — MEMANTINE HYDROCHLORIDE 10 MG/1
10 TABLET ORAL 2 TIMES DAILY
Qty: 60 TABLET | Refills: 5 | Status: SHIPPED | OUTPATIENT
Start: 2024-11-18

## 2024-11-18 RX ORDER — ATORVASTATIN CALCIUM 40 MG/1
40 TABLET, FILM COATED ORAL DAILY
Qty: 90 TABLET | Refills: 2 | Status: SHIPPED | OUTPATIENT
Start: 2024-11-18

## 2024-11-18 RX ORDER — LACOSAMIDE 200 MG/1
200 TABLET ORAL 2 TIMES DAILY
Qty: 180 TABLET | Refills: 2 | Status: SHIPPED | OUTPATIENT
Start: 2024-11-18 | End: 2025-05-19

## 2024-11-18 RX ORDER — LEVETIRACETAM 750 MG/1
1500 TABLET ORAL 2 TIMES DAILY
Qty: 360 TABLET | Refills: 2 | Status: SHIPPED | OUTPATIENT
Start: 2024-11-18

## 2024-11-18 RX ORDER — CLOPIDOGREL BISULFATE 75 MG/1
75 TABLET ORAL DAILY
Qty: 90 TABLET | Refills: 2 | Status: SHIPPED | OUTPATIENT
Start: 2024-11-18

## 2024-11-18 RX ORDER — DIVALPROEX SODIUM 500 MG/1
500 TABLET, DELAYED RELEASE ORAL 2 TIMES DAILY
Qty: 180 TABLET | Refills: 2 | Status: SHIPPED | OUTPATIENT
Start: 2024-11-18

## 2024-11-18 NOTE — PROGRESS NOTES
11/18/24    Brent Degroot  1960    Chief Complaint   Patient presents with    Follow-up     1y follow up for Localization-related (focal) (partial) idiopathic epilepsy and epileptic syndromes with seizures of localized onset, not intractable, with status epilepticus (HCC) and Cerebrovascular accident (CVA) due to thrombosis of right middle cerebral artery (HCC)       History of Present Illness  Brent is a 64 y.o. male presenting today for follow-up of: Seizures, stroke, MCI.  He is currently on Vimpat 200 mg twice daily, Keppra 1500 mg twice daily and Depakote 500 mg twice daily.       He has a history of stroke and remains on Plavix and statin for secondary stroke prevention.      He remains on donepezil 10 mg daily to help slow the progression of memory loss.  He lives with his brother.  He manages his own finances and medications.  He does not drive.  He likes to stay active by building things in his \" hobby shop.\"     Last CMP was normal 7/2022.  His PCP checks a CBC/CMP yearly-12/7/23.     He is unsteady on his feet but he denies falls.  He is not interested in balance therapy.    Last MMSE was 26/27 on 10/24/2023.    He is on Aricept 10 mg daily to help slow the progression of memory loss.  He lives with his brother.  He does not drive.  He manages his medications and finances.  He can cook but his brother mostly does the cooking.  He helps clean.  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      Current Outpatient Medications   Medication Sig Dispense Refill    atorvastatin (LIPITOR) 40 MG tablet Take 1 tablet by mouth daily 90 tablet 2    clopidogrel (PLAVIX) 75 MG tablet Take 1 tablet by mouth daily 90 tablet 2    divalproex (DEPAKOTE) 500 MG DR tablet Take 1 tablet by mouth in the morning and 1 tablet in the evening. 180 tablet 2    donepezil (ARICEPT) 10 MG tablet Take 1 tablet by mouth nightly 90 tablet 3    lacosamide (VIMPAT) 200 MG tablet Take 1 tablet by mouth 2

## 2024-11-21 ENCOUNTER — TELEPHONE (OUTPATIENT)
Dept: NEUROLOGY | Age: 64
End: 2024-11-21

## 2024-11-21 NOTE — TELEPHONE ENCOUNTER
PA request keppra  The patient currently has access to the requested medication and a Prior Authorization is not needed for the patient/medication.

## 2024-12-04 RX ORDER — MEMANTINE HYDROCHLORIDE 5 MG/1
TABLET ORAL
Qty: 42 TABLET | Refills: 0 | OUTPATIENT
Start: 2024-12-04

## 2024-12-09 ENCOUNTER — APPOINTMENT (OUTPATIENT)
Dept: GENERAL RADIOLOGY | Age: 64
DRG: 481 | End: 2024-12-09
Payer: MEDICARE

## 2024-12-09 ENCOUNTER — HOSPITAL ENCOUNTER (INPATIENT)
Age: 64
LOS: 10 days | Discharge: NURSING, REHAB OR LONG-TERM CARE | DRG: 481 | End: 2024-12-19
Attending: EMERGENCY MEDICINE | Admitting: STUDENT IN AN ORGANIZED HEALTH CARE EDUCATION/TRAINING PROGRAM
Payer: MEDICARE

## 2024-12-09 DIAGNOSIS — S72.145A CLOSED NONDISPLACED INTERTROCHANTERIC FRACTURE OF LEFT FEMUR, INITIAL ENCOUNTER (HCC): ICD-10-CM

## 2024-12-09 DIAGNOSIS — S72.002A CLOSED LEFT HIP FRACTURE, INITIAL ENCOUNTER (HCC): Primary | ICD-10-CM

## 2024-12-09 LAB
ABO + RH BLD: NORMAL
ANION GAP SERPL CALCULATED.3IONS-SCNC: 9 MMOL/L (ref 9–17)
BASOPHILS # BLD: 0.05 K/UL
BASOPHILS NFR BLD: 1 % (ref 0–1)
BLOOD BANK COMMENT: NORMAL
BLOOD BANK SAMPLE EXPIRATION: NORMAL
BLOOD GROUP ANTIBODIES SERPL: NEGATIVE
BUN SERPL-MCNC: 18 MG/DL (ref 7–20)
CALCIUM SERPL-MCNC: 8.7 MG/DL (ref 8.3–10.6)
CHLORIDE SERPL-SCNC: 103 MMOL/L (ref 99–110)
CO2 SERPL-SCNC: 26 MMOL/L (ref 21–32)
CREAT SERPL-MCNC: 0.7 MG/DL (ref 0.8–1.3)
EOSINOPHIL # BLD: 0.03 K/UL
EOSINOPHILS RELATIVE PERCENT: 0 % (ref 0–3)
ERYTHROCYTE [DISTWIDTH] IN BLOOD BY AUTOMATED COUNT: 13.5 % (ref 11.7–14.9)
GFR, ESTIMATED: >90 ML/MIN/1.73M2
GLUCOSE SERPL-MCNC: 126 MG/DL (ref 74–99)
HCT VFR BLD AUTO: 36.3 % (ref 42–52)
HGB BLD-MCNC: 11.9 G/DL (ref 13.5–18)
IMM GRANULOCYTES # BLD AUTO: 0.03 K/UL
IMM GRANULOCYTES NFR BLD: 0 %
INR PPP: 1.1
LYMPHOCYTES NFR BLD: 2.05 K/UL
LYMPHOCYTES RELATIVE PERCENT: 22 % (ref 24–44)
MCH RBC QN AUTO: 33.1 PG (ref 27–31)
MCHC RBC AUTO-ENTMCNC: 32.8 G/DL (ref 32–36)
MCV RBC AUTO: 101.1 FL (ref 78–100)
MONOCYTES NFR BLD: 0.69 K/UL
MONOCYTES NFR BLD: 7 % (ref 0–4)
NEUTROPHILS NFR BLD: 70 % (ref 36–66)
NEUTS SEG NFR BLD: 6.59 K/UL
PARTIAL THROMBOPLASTIN TIME: 33.6 SEC (ref 25.1–37.1)
PLATELET # BLD AUTO: 241 K/UL (ref 140–440)
PMV BLD AUTO: 10.1 FL (ref 7.5–11.1)
POTASSIUM SERPL-SCNC: 3.8 MMOL/L (ref 3.5–5.1)
PROTHROMBIN TIME: 14.2 SEC (ref 11.7–14.5)
RBC # BLD AUTO: 3.59 M/UL (ref 4.6–6.2)
SODIUM SERPL-SCNC: 138 MMOL/L (ref 136–145)
WBC OTHER # BLD: 9.4 K/UL (ref 4–10.5)

## 2024-12-09 PROCEDURE — 71045 X-RAY EXAM CHEST 1 VIEW: CPT

## 2024-12-09 PROCEDURE — 6370000000 HC RX 637 (ALT 250 FOR IP): Performed by: STUDENT IN AN ORGANIZED HEALTH CARE EDUCATION/TRAINING PROGRAM

## 2024-12-09 PROCEDURE — 36415 COLL VENOUS BLD VENIPUNCTURE: CPT

## 2024-12-09 PROCEDURE — 80048 BASIC METABOLIC PNL TOTAL CA: CPT

## 2024-12-09 PROCEDURE — 86901 BLOOD TYPING SEROLOGIC RH(D): CPT

## 2024-12-09 PROCEDURE — 86850 RBC ANTIBODY SCREEN: CPT

## 2024-12-09 PROCEDURE — 73552 X-RAY EXAM OF FEMUR 2/>: CPT

## 2024-12-09 PROCEDURE — 86900 BLOOD TYPING SEROLOGIC ABO: CPT

## 2024-12-09 PROCEDURE — 73502 X-RAY EXAM HIP UNI 2-3 VIEWS: CPT

## 2024-12-09 PROCEDURE — 82746 ASSAY OF FOLIC ACID SERUM: CPT

## 2024-12-09 PROCEDURE — 85025 COMPLETE CBC W/AUTO DIFF WBC: CPT

## 2024-12-09 PROCEDURE — 1200000000 HC SEMI PRIVATE

## 2024-12-09 PROCEDURE — 85730 THROMBOPLASTIN TIME PARTIAL: CPT

## 2024-12-09 PROCEDURE — 2580000003 HC RX 258: Performed by: STUDENT IN AN ORGANIZED HEALTH CARE EDUCATION/TRAINING PROGRAM

## 2024-12-09 PROCEDURE — 85610 PROTHROMBIN TIME: CPT

## 2024-12-09 PROCEDURE — 82607 VITAMIN B-12: CPT

## 2024-12-09 PROCEDURE — 6360000002 HC RX W HCPCS: Performed by: EMERGENCY MEDICINE

## 2024-12-09 PROCEDURE — 99285 EMERGENCY DEPT VISIT HI MDM: CPT

## 2024-12-09 RX ORDER — CLOPIDOGREL BISULFATE 75 MG/1
75 TABLET ORAL DAILY
Status: DISCONTINUED | OUTPATIENT
Start: 2024-12-10 | End: 2024-12-19 | Stop reason: HOSPADM

## 2024-12-09 RX ORDER — DIVALPROEX SODIUM 500 MG/1
500 TABLET, DELAYED RELEASE ORAL 2 TIMES DAILY
Status: DISCONTINUED | OUTPATIENT
Start: 2024-12-09 | End: 2024-12-19 | Stop reason: HOSPADM

## 2024-12-09 RX ORDER — SODIUM CHLORIDE 9 MG/ML
INJECTION, SOLUTION INTRAVENOUS PRN
Status: DISCONTINUED | OUTPATIENT
Start: 2024-12-09 | End: 2024-12-19 | Stop reason: HOSPADM

## 2024-12-09 RX ORDER — DONEPEZIL HYDROCHLORIDE 10 MG/1
10 TABLET, FILM COATED ORAL NIGHTLY
Status: DISCONTINUED | OUTPATIENT
Start: 2024-12-10 | End: 2024-12-19 | Stop reason: HOSPADM

## 2024-12-09 RX ORDER — LANOLIN ALCOHOL/MO/W.PET/CERES
100 CREAM (GRAM) TOPICAL DAILY
Status: DISCONTINUED | OUTPATIENT
Start: 2024-12-10 | End: 2024-12-19 | Stop reason: HOSPADM

## 2024-12-09 RX ORDER — ATORVASTATIN CALCIUM 40 MG/1
40 TABLET, FILM COATED ORAL NIGHTLY
Status: DISCONTINUED | OUTPATIENT
Start: 2024-12-09 | End: 2024-12-19 | Stop reason: HOSPADM

## 2024-12-09 RX ORDER — SODIUM CHLORIDE 0.9 % (FLUSH) 0.9 %
5-40 SYRINGE (ML) INJECTION EVERY 12 HOURS SCHEDULED
Status: DISCONTINUED | OUTPATIENT
Start: 2024-12-09 | End: 2024-12-19 | Stop reason: HOSPADM

## 2024-12-09 RX ORDER — FOLIC ACID 1 MG/1
0.5 TABLET ORAL DAILY
Status: DISCONTINUED | OUTPATIENT
Start: 2024-12-10 | End: 2024-12-19 | Stop reason: HOSPADM

## 2024-12-09 RX ORDER — HYDROMORPHONE HYDROCHLORIDE 1 MG/ML
0.5 INJECTION, SOLUTION INTRAMUSCULAR; INTRAVENOUS; SUBCUTANEOUS ONCE
Status: COMPLETED | OUTPATIENT
Start: 2024-12-09 | End: 2024-12-09

## 2024-12-09 RX ORDER — ENOXAPARIN SODIUM 100 MG/ML
30 INJECTION SUBCUTANEOUS EVERY EVENING
Status: DISCONTINUED | OUTPATIENT
Start: 2024-12-10 | End: 2024-12-19 | Stop reason: HOSPADM

## 2024-12-09 RX ORDER — FOLIC ACID 0.4 MG
400 TABLET ORAL DAILY
Status: DISCONTINUED | OUTPATIENT
Start: 2024-12-10 | End: 2024-12-09

## 2024-12-09 RX ORDER — OXYCODONE HYDROCHLORIDE 10 MG/1
10 TABLET ORAL EVERY 4 HOURS PRN
Status: COMPLETED | OUTPATIENT
Start: 2024-12-09 | End: 2024-12-11

## 2024-12-09 RX ORDER — MEMANTINE HYDROCHLORIDE 10 MG/1
10 TABLET ORAL 2 TIMES DAILY
Status: DISCONTINUED | OUTPATIENT
Start: 2024-12-09 | End: 2024-12-19 | Stop reason: HOSPADM

## 2024-12-09 RX ORDER — ONDANSETRON 4 MG/1
4 TABLET, ORALLY DISINTEGRATING ORAL EVERY 8 HOURS PRN
Status: DISCONTINUED | OUTPATIENT
Start: 2024-12-09 | End: 2024-12-19 | Stop reason: HOSPADM

## 2024-12-09 RX ORDER — ONDANSETRON 2 MG/ML
4 INJECTION INTRAMUSCULAR; INTRAVENOUS EVERY 6 HOURS PRN
Status: DISCONTINUED | OUTPATIENT
Start: 2024-12-09 | End: 2024-12-19 | Stop reason: HOSPADM

## 2024-12-09 RX ORDER — SODIUM CHLORIDE 0.9 % (FLUSH) 0.9 %
5-40 SYRINGE (ML) INJECTION PRN
Status: DISCONTINUED | OUTPATIENT
Start: 2024-12-09 | End: 2024-12-19 | Stop reason: HOSPADM

## 2024-12-09 RX ORDER — ACETAMINOPHEN 325 MG/1
650 TABLET ORAL EVERY 6 HOURS SCHEDULED
Status: DISCONTINUED | OUTPATIENT
Start: 2024-12-09 | End: 2024-12-14

## 2024-12-09 RX ORDER — POLYETHYLENE GLYCOL 3350 17 G/17G
17 POWDER, FOR SOLUTION ORAL DAILY PRN
Status: DISCONTINUED | OUTPATIENT
Start: 2024-12-09 | End: 2024-12-19 | Stop reason: HOSPADM

## 2024-12-09 RX ORDER — LANOLIN ALCOHOL/MO/W.PET/CERES
1000 CREAM (GRAM) TOPICAL 2 TIMES DAILY
Status: DISCONTINUED | OUTPATIENT
Start: 2024-12-09 | End: 2024-12-19 | Stop reason: HOSPADM

## 2024-12-09 RX ORDER — LEVETIRACETAM 500 MG/1
1500 TABLET ORAL 2 TIMES DAILY
Status: DISCONTINUED | OUTPATIENT
Start: 2024-12-09 | End: 2024-12-19 | Stop reason: HOSPADM

## 2024-12-09 RX ORDER — LACOSAMIDE 100 MG/1
200 TABLET ORAL 2 TIMES DAILY
Status: DISCONTINUED | OUTPATIENT
Start: 2024-12-09 | End: 2024-12-19 | Stop reason: HOSPADM

## 2024-12-09 RX ORDER — OXYCODONE HYDROCHLORIDE 5 MG/1
5 TABLET ORAL EVERY 4 HOURS PRN
Status: COMPLETED | OUTPATIENT
Start: 2024-12-09 | End: 2024-12-11

## 2024-12-09 RX ADMIN — LEVETIRACETAM 1500 MG: 500 TABLET, FILM COATED ORAL at 22:58

## 2024-12-09 RX ADMIN — Medication 1000 MCG: at 22:58

## 2024-12-09 RX ADMIN — HYDROMORPHONE HYDROCHLORIDE 0.5 MG: 1 INJECTION, SOLUTION INTRAMUSCULAR; INTRAVENOUS; SUBCUTANEOUS at 22:49

## 2024-12-09 RX ADMIN — SODIUM CHLORIDE, PRESERVATIVE FREE 10 ML: 5 INJECTION INTRAVENOUS at 22:59

## 2024-12-09 RX ADMIN — MELATONIN TAB 3 MG 6 MG: 3 TAB at 22:57

## 2024-12-09 RX ADMIN — ACETAMINOPHEN 650 MG: 325 TABLET ORAL at 22:57

## 2024-12-09 RX ADMIN — MEMANTINE 10 MG: 10 TABLET ORAL at 22:56

## 2024-12-09 RX ADMIN — LACOSAMIDE 200 MG: 100 TABLET, FILM COATED ORAL at 22:57

## 2024-12-09 RX ADMIN — ATORVASTATIN CALCIUM 40 MG: 40 TABLET, FILM COATED ORAL at 22:58

## 2024-12-09 ASSESSMENT — PAIN DESCRIPTION - DESCRIPTORS: DESCRIPTORS: ACHING

## 2024-12-09 ASSESSMENT — PAIN DESCRIPTION - PAIN TYPE: TYPE: ACUTE PAIN

## 2024-12-09 ASSESSMENT — PAIN DESCRIPTION - ORIENTATION
ORIENTATION: LEFT
ORIENTATION: LEFT

## 2024-12-09 ASSESSMENT — PAIN DESCRIPTION - LOCATION
LOCATION: HIP
LOCATION: HIP

## 2024-12-09 ASSESSMENT — PAIN DESCRIPTION - ONSET: ONSET: ON-GOING

## 2024-12-09 ASSESSMENT — PAIN DESCRIPTION - FREQUENCY: FREQUENCY: CONTINUOUS

## 2024-12-09 ASSESSMENT — PAIN SCALES - GENERAL
PAINLEVEL_OUTOF10: 10
PAINLEVEL_OUTOF10: 10

## 2024-12-09 ASSESSMENT — PAIN - FUNCTIONAL ASSESSMENT: PAIN_FUNCTIONAL_ASSESSMENT: PREVENTS OR INTERFERES SOME ACTIVE ACTIVITIES AND ADLS

## 2024-12-09 ASSESSMENT — PAIN DESCRIPTION - DIRECTION: RADIATING_TOWARDS: A

## 2024-12-09 NOTE — ED PROVIDER NOTES
age.  Skin:  Warm. Dry.  There is no open wounds or sores or active bleeding the rib fracture site.  Eye:  Extraocular movements intact.     Ears, nose, mouth and throat:  Oral mucosa moist   Extremity:  No swelling.  Normal ROM other than the left hip left lower leg secondary to pain at the left hip.  Left lower extremity is slightly shorter than the right lower extremity.  Strong PT pulse to the left foot with left foot well-perfused.   Sensation intact globally light touch.  5-5/plantarflexion.  Soft compartments throughout the left leg  Heart:  Strong and symmetric peripheral pulses.  Extremities are well perfused.   Abdomen:  Non-distended.  Respiratory:  Respirations nonlabored.     Neurological:  Alert and oriented times 3.  No focal neuro deficits.  Sensation intact to light touch to distal upper/lower extremities; 5/5 and symmetric  and dorsi/plantar flexion.                I have reviewed and interpreted all of the currently available lab results from this visit (if applicable):  No results found for this visit on 12/09/24.   Radiographs (if obtained):  [] The following radiograph was interpreted by myself in the absence of a radiologist:   [] Radiologist's Report Reviewed:  XR HIP 2-3 VW W PELVIS LEFT    (Results Pending)         EKG (if obtained): (All EKG's are interpreted by myself in the absence of a cardiologist)    Chart review shows recent radiographs:  No results found.    MDM:  CC/HPI Summary, DDx, ED Course, and Reassessment:   Pt presents as above.  Emergent conditions considered.  Presentation prompted initial x-ray imaging as above.  Patient history symptomatic with IV Dilaudid.  Labs are also obtained as a suspicion for hip fracture and patient will require admission.    ---  X-ray does demonstrate a left-sided intertrochanteric hip fracture.  Orthopedics is consulted and I did speak with Dr. Jones who is able to assist with the repair and will likely be tomorrow afternoon.  He would  like patient n.p.o. at midnight.  Preop labs are sent as well as preop EKG and chest x-ray.  Hospitalist is consulted with workup pending for further medical clearance prior to surgery.  Patient is updated and agreeable with plan for admission.    History from : Patient    Limitations to history : None    Patient was given the following medications:  Medications   HYDROmorphone HCl PF (DILAUDID) injection 0.5 mg (has no administration in time range)       Independent Imaging Interpretation by me: XR hip with no dislocation but non-displaced proximal femur fracture per my read.    Chronic conditions affecting care: Seizure disorder on antiepileptics    Discussion with Other Profesionals : Admitting Team (hospitalist) and Consultant (ortho)    Social Determinants : None    Records Reviewed : None    Disposition Considerations (tests considered but not done, Shared Decision Making, Pt Expectation of Test or Tx.):   Appropriate for outpatient management      I discussed specific signs and symptoms and when to return to the emergency department as well as need for close outpatient follow-up.  Questions sought and answered with the patient. They voice understanding and agree with plan.    I am the Primary Clinician of Record.            Clinical Impression:  1. Closed left hip fracture, initial encounter ([facility])      Disposition referral (if applicable):  No follow-up provider specified.  Disposition medications (if applicable):  New Prescriptions    No medications on file       Comment: Please note this report has been produced using speech recognition software and may contain errors related to that system including errors in grammar, punctuation, and spelling, as well as words and phrases that may be inappropriate. If there are any questions or concerns please feel free to contact the dictating provider for clarification.          Cole Casey MD  12/11/24 8475

## 2024-12-10 ENCOUNTER — APPOINTMENT (OUTPATIENT)
Dept: GENERAL RADIOLOGY | Age: 64
DRG: 481 | End: 2024-12-10
Payer: MEDICARE

## 2024-12-10 ENCOUNTER — ANESTHESIA EVENT (OUTPATIENT)
Dept: OPERATING ROOM | Age: 64
End: 2024-12-10
Payer: MEDICARE

## 2024-12-10 ENCOUNTER — ANESTHESIA (OUTPATIENT)
Dept: OPERATING ROOM | Age: 64
End: 2024-12-10
Payer: MEDICARE

## 2024-12-10 LAB
FOLATE SERPL-MCNC: 36.7 NG/ML (ref 4.8–24.2)
VIT B12 SERPL-MCNC: 1289 PG/ML (ref 211–911)

## 2024-12-10 PROCEDURE — 2580000003 HC RX 258: Performed by: STUDENT IN AN ORGANIZED HEALTH CARE EDUCATION/TRAINING PROGRAM

## 2024-12-10 PROCEDURE — 76000 FLUOROSCOPY <1 HR PHYS/QHP: CPT

## 2024-12-10 PROCEDURE — 6370000000 HC RX 637 (ALT 250 FOR IP): Performed by: STUDENT IN AN ORGANIZED HEALTH CARE EDUCATION/TRAINING PROGRAM

## 2024-12-10 PROCEDURE — 6360000002 HC RX W HCPCS

## 2024-12-10 PROCEDURE — 94761 N-INVAS EAR/PLS OXIMETRY MLT: CPT

## 2024-12-10 PROCEDURE — 3600000004 HC SURGERY LEVEL 4 BASE: Performed by: STUDENT IN AN ORGANIZED HEALTH CARE EDUCATION/TRAINING PROGRAM

## 2024-12-10 PROCEDURE — 1200000000 HC SEMI PRIVATE

## 2024-12-10 PROCEDURE — 3600000014 HC SURGERY LEVEL 4 ADDTL 15MIN: Performed by: STUDENT IN AN ORGANIZED HEALTH CARE EDUCATION/TRAINING PROGRAM

## 2024-12-10 PROCEDURE — C1769 GUIDE WIRE: HCPCS | Performed by: STUDENT IN AN ORGANIZED HEALTH CARE EDUCATION/TRAINING PROGRAM

## 2024-12-10 PROCEDURE — 2500000003 HC RX 250 WO HCPCS: Performed by: STUDENT IN AN ORGANIZED HEALTH CARE EDUCATION/TRAINING PROGRAM

## 2024-12-10 PROCEDURE — C1713 ANCHOR/SCREW BN/BN,TIS/BN: HCPCS | Performed by: STUDENT IN AN ORGANIZED HEALTH CARE EDUCATION/TRAINING PROGRAM

## 2024-12-10 PROCEDURE — 2500000003 HC RX 250 WO HCPCS

## 2024-12-10 PROCEDURE — P9045 ALBUMIN (HUMAN), 5%, 250 ML: HCPCS

## 2024-12-10 PROCEDURE — 3700000001 HC ADD 15 MINUTES (ANESTHESIA): Performed by: STUDENT IN AN ORGANIZED HEALTH CARE EDUCATION/TRAINING PROGRAM

## 2024-12-10 PROCEDURE — 0QS706Z REPOSITION LEFT UPPER FEMUR WITH INTRAMEDULLARY INTERNAL FIXATION DEVICE, OPEN APPROACH: ICD-10-PCS | Performed by: STUDENT IN AN ORGANIZED HEALTH CARE EDUCATION/TRAINING PROGRAM

## 2024-12-10 PROCEDURE — 2580000003 HC RX 258

## 2024-12-10 PROCEDURE — 3700000000 HC ANESTHESIA ATTENDED CARE: Performed by: STUDENT IN AN ORGANIZED HEALTH CARE EDUCATION/TRAINING PROGRAM

## 2024-12-10 PROCEDURE — 7100000000 HC PACU RECOVERY - FIRST 15 MIN: Performed by: STUDENT IN AN ORGANIZED HEALTH CARE EDUCATION/TRAINING PROGRAM

## 2024-12-10 PROCEDURE — 6360000002 HC RX W HCPCS: Performed by: ANESTHESIOLOGY

## 2024-12-10 PROCEDURE — 2720000010 HC SURG SUPPLY STERILE: Performed by: STUDENT IN AN ORGANIZED HEALTH CARE EDUCATION/TRAINING PROGRAM

## 2024-12-10 PROCEDURE — 7100000001 HC PACU RECOVERY - ADDTL 15 MIN: Performed by: STUDENT IN AN ORGANIZED HEALTH CARE EDUCATION/TRAINING PROGRAM

## 2024-12-10 PROCEDURE — 2709999900 HC NON-CHARGEABLE SUPPLY: Performed by: STUDENT IN AN ORGANIZED HEALTH CARE EDUCATION/TRAINING PROGRAM

## 2024-12-10 DEVICE — SCREW BNE L34MM DIA5MM TIB LT GRN TI ST CANN LOK FULL THRD: Type: IMPLANTABLE DEVICE | Site: HIP | Status: FUNCTIONAL

## 2024-12-10 DEVICE — NAIL IM L170MM DIA11MM NK 125DEG SHT PROX FEM GRN TI-15MO: Type: IMPLANTABLE DEVICE | Site: HIP | Status: FUNCTIONAL

## 2024-12-10 DEVICE — IMPLANTABLE DEVICE: Type: IMPLANTABLE DEVICE | Site: HIP | Status: FUNCTIONAL

## 2024-12-10 RX ORDER — ACETAMINOPHEN 325 MG/1
650 TABLET ORAL ONCE
Status: DISCONTINUED | OUTPATIENT
Start: 2024-12-10 | End: 2024-12-10 | Stop reason: HOSPADM

## 2024-12-10 RX ORDER — ONDANSETRON 2 MG/ML
4 INJECTION INTRAMUSCULAR; INTRAVENOUS
Status: DISCONTINUED | OUTPATIENT
Start: 2024-12-10 | End: 2024-12-10 | Stop reason: HOSPADM

## 2024-12-10 RX ORDER — DEXAMETHASONE SODIUM PHOSPHATE 4 MG/ML
INJECTION, SOLUTION INTRA-ARTICULAR; INTRALESIONAL; INTRAMUSCULAR; INTRAVENOUS; SOFT TISSUE
Status: DISCONTINUED | OUTPATIENT
Start: 2024-12-10 | End: 2024-12-10 | Stop reason: SDUPTHER

## 2024-12-10 RX ORDER — MIDAZOLAM HYDROCHLORIDE 1 MG/ML
INJECTION, SOLUTION INTRAMUSCULAR; INTRAVENOUS
Status: COMPLETED
Start: 2024-12-10 | End: 2024-12-10

## 2024-12-10 RX ORDER — LIDOCAINE HYDROCHLORIDE 10 MG/ML
INJECTION, SOLUTION EPIDURAL; INFILTRATION; INTRACAUDAL; PERINEURAL
Status: DISPENSED
Start: 2024-12-10 | End: 2024-12-11

## 2024-12-10 RX ORDER — PROCHLORPERAZINE EDISYLATE 5 MG/ML
5 INJECTION INTRAMUSCULAR; INTRAVENOUS
Status: DISCONTINUED | OUTPATIENT
Start: 2024-12-10 | End: 2024-12-10 | Stop reason: HOSPADM

## 2024-12-10 RX ORDER — EPHEDRINE SULFATE 50 MG/ML
INJECTION INTRAVENOUS
Status: DISCONTINUED | OUTPATIENT
Start: 2024-12-10 | End: 2024-12-10 | Stop reason: SDUPTHER

## 2024-12-10 RX ORDER — LABETALOL HYDROCHLORIDE 5 MG/ML
5 INJECTION, SOLUTION INTRAVENOUS
Status: DISCONTINUED | OUTPATIENT
Start: 2024-12-10 | End: 2024-12-10 | Stop reason: HOSPADM

## 2024-12-10 RX ORDER — FENTANYL CITRATE 50 UG/ML
25 INJECTION, SOLUTION INTRAMUSCULAR; INTRAVENOUS EVERY 5 MIN PRN
Status: DISCONTINUED | OUTPATIENT
Start: 2024-12-10 | End: 2024-12-10 | Stop reason: HOSPADM

## 2024-12-10 RX ORDER — OXYCODONE HYDROCHLORIDE 5 MG/1
10 TABLET ORAL PRN
Status: DISCONTINUED | OUTPATIENT
Start: 2024-12-10 | End: 2024-12-10 | Stop reason: HOSPADM

## 2024-12-10 RX ORDER — FENTANYL CITRATE 50 UG/ML
INJECTION, SOLUTION INTRAMUSCULAR; INTRAVENOUS
Status: COMPLETED
Start: 2024-12-10 | End: 2024-12-10

## 2024-12-10 RX ORDER — PHENYLEPHRINE HCL IN 0.9% NACL 1 MG/10 ML
SYRINGE (ML) INTRAVENOUS
Status: DISCONTINUED | OUTPATIENT
Start: 2024-12-10 | End: 2024-12-10 | Stop reason: SDUPTHER

## 2024-12-10 RX ORDER — SODIUM CHLORIDE 0.9 % (FLUSH) 0.9 %
5-40 SYRINGE (ML) INJECTION PRN
Status: DISCONTINUED | OUTPATIENT
Start: 2024-12-10 | End: 2024-12-10 | Stop reason: HOSPADM

## 2024-12-10 RX ORDER — ALBUMIN HUMAN 50 G/1000ML
SOLUTION INTRAVENOUS
Status: DISCONTINUED | OUTPATIENT
Start: 2024-12-10 | End: 2024-12-10 | Stop reason: SDUPTHER

## 2024-12-10 RX ORDER — MIDAZOLAM HYDROCHLORIDE 1 MG/ML
INJECTION, SOLUTION INTRAMUSCULAR; INTRAVENOUS
Status: DISCONTINUED | OUTPATIENT
Start: 2024-12-10 | End: 2024-12-10 | Stop reason: SDUPTHER

## 2024-12-10 RX ORDER — OXYCODONE HYDROCHLORIDE 5 MG/1
5 TABLET ORAL PRN
Status: DISCONTINUED | OUTPATIENT
Start: 2024-12-10 | End: 2024-12-10 | Stop reason: HOSPADM

## 2024-12-10 RX ORDER — ROPIVACAINE HYDROCHLORIDE 5 MG/ML
INJECTION, SOLUTION EPIDURAL; INFILTRATION; PERINEURAL
Status: DISPENSED
Start: 2024-12-10 | End: 2024-12-11

## 2024-12-10 RX ORDER — PROPOFOL 10 MG/ML
INJECTION, EMULSION INTRAVENOUS
Status: DISCONTINUED | OUTPATIENT
Start: 2024-12-10 | End: 2024-12-10 | Stop reason: SDUPTHER

## 2024-12-10 RX ORDER — ROCURONIUM BROMIDE 10 MG/ML
INJECTION, SOLUTION INTRAVENOUS
Status: DISCONTINUED | OUTPATIENT
Start: 2024-12-10 | End: 2024-12-10 | Stop reason: SDUPTHER

## 2024-12-10 RX ORDER — KETOROLAC TROMETHAMINE 30 MG/ML
15 INJECTION, SOLUTION INTRAMUSCULAR; INTRAVENOUS
Status: DISCONTINUED | OUTPATIENT
Start: 2024-12-10 | End: 2024-12-10 | Stop reason: HOSPADM

## 2024-12-10 RX ORDER — SODIUM CHLORIDE 0.9 % (FLUSH) 0.9 %
5-40 SYRINGE (ML) INJECTION EVERY 12 HOURS SCHEDULED
Status: DISCONTINUED | OUTPATIENT
Start: 2024-12-10 | End: 2024-12-10 | Stop reason: HOSPADM

## 2024-12-10 RX ORDER — ONDANSETRON 2 MG/ML
INJECTION INTRAMUSCULAR; INTRAVENOUS
Status: DISCONTINUED | OUTPATIENT
Start: 2024-12-10 | End: 2024-12-10 | Stop reason: SDUPTHER

## 2024-12-10 RX ORDER — FENTANYL CITRATE 50 UG/ML
INJECTION, SOLUTION INTRAMUSCULAR; INTRAVENOUS
Status: DISCONTINUED | OUTPATIENT
Start: 2024-12-10 | End: 2024-12-10 | Stop reason: SDUPTHER

## 2024-12-10 RX ORDER — LIDOCAINE HYDROCHLORIDE 20 MG/ML
INJECTION, SOLUTION EPIDURAL; INFILTRATION; INTRACAUDAL; PERINEURAL
Status: DISCONTINUED | OUTPATIENT
Start: 2024-12-10 | End: 2024-12-10 | Stop reason: SDUPTHER

## 2024-12-10 RX ORDER — SODIUM CHLORIDE 9 MG/ML
INJECTION, SOLUTION INTRAVENOUS PRN
Status: DISCONTINUED | OUTPATIENT
Start: 2024-12-10 | End: 2024-12-10 | Stop reason: HOSPADM

## 2024-12-10 RX ORDER — SODIUM CHLORIDE, SODIUM LACTATE, POTASSIUM CHLORIDE, CALCIUM CHLORIDE 600; 310; 30; 20 MG/100ML; MG/100ML; MG/100ML; MG/100ML
INJECTION, SOLUTION INTRAVENOUS
Status: DISCONTINUED | OUTPATIENT
Start: 2024-12-10 | End: 2024-12-10 | Stop reason: SDUPTHER

## 2024-12-10 RX ORDER — NALOXONE HYDROCHLORIDE 0.4 MG/ML
INJECTION, SOLUTION INTRAMUSCULAR; INTRAVENOUS; SUBCUTANEOUS PRN
Status: DISCONTINUED | OUTPATIENT
Start: 2024-12-10 | End: 2024-12-10 | Stop reason: HOSPADM

## 2024-12-10 RX ORDER — TRANEXAMIC ACID 10 MG/ML
1000 INJECTION, SOLUTION INTRAVENOUS ONCE
Status: COMPLETED | OUTPATIENT
Start: 2024-12-10 | End: 2024-12-10

## 2024-12-10 RX ORDER — IPRATROPIUM BROMIDE AND ALBUTEROL SULFATE 2.5; .5 MG/3ML; MG/3ML
1 SOLUTION RESPIRATORY (INHALATION)
Status: DISCONTINUED | OUTPATIENT
Start: 2024-12-10 | End: 2024-12-10 | Stop reason: HOSPADM

## 2024-12-10 RX ADMIN — ALBUMIN (HUMAN) 12.5 G: 12.5 INJECTION, SOLUTION INTRAVENOUS at 17:04

## 2024-12-10 RX ADMIN — SERTRALINE HYDROCHLORIDE 200 MG: 50 TABLET ORAL at 10:35

## 2024-12-10 RX ADMIN — DIVALPROEX SODIUM 500 MG: 500 TABLET, DELAYED RELEASE ORAL at 23:36

## 2024-12-10 RX ADMIN — Medication 0.1 MG: at 16:49

## 2024-12-10 RX ADMIN — ACETAMINOPHEN 650 MG: 325 TABLET ORAL at 06:48

## 2024-12-10 RX ADMIN — Medication 1000 MCG: at 10:35

## 2024-12-10 RX ADMIN — Medication 0.1 MG: at 17:10

## 2024-12-10 RX ADMIN — OXYCODONE HYDROCHLORIDE 10 MG: 10 TABLET ORAL at 10:36

## 2024-12-10 RX ADMIN — LACOSAMIDE 200 MG: 100 TABLET, FILM COATED ORAL at 23:36

## 2024-12-10 RX ADMIN — DIVALPROEX SODIUM 500 MG: 500 TABLET, DELAYED RELEASE ORAL at 10:41

## 2024-12-10 RX ADMIN — Medication 0.2 MG: at 16:43

## 2024-12-10 RX ADMIN — DEXAMETHASONE SODIUM PHOSPHATE 4 MG: 4 INJECTION, SOLUTION INTRAMUSCULAR; INTRAVENOUS at 16:37

## 2024-12-10 RX ADMIN — MEMANTINE 10 MG: 10 TABLET ORAL at 10:36

## 2024-12-10 RX ADMIN — LEVETIRACETAM 1500 MG: 500 TABLET, FILM COATED ORAL at 10:35

## 2024-12-10 RX ADMIN — ONDANSETRON 4 MG: 2 INJECTION INTRAMUSCULAR; INTRAVENOUS at 16:37

## 2024-12-10 RX ADMIN — Medication 1000 MCG: at 23:36

## 2024-12-10 RX ADMIN — SODIUM CHLORIDE, PRESERVATIVE FREE 10 ML: 5 INJECTION INTRAVENOUS at 10:45

## 2024-12-10 RX ADMIN — EPHEDRINE SULFATE 25 MG: 50 INJECTION INTRAVENOUS at 17:29

## 2024-12-10 RX ADMIN — CEFAZOLIN 2000 MG: 2 INJECTION, POWDER, FOR SOLUTION INTRAMUSCULAR; INTRAVENOUS at 16:41

## 2024-12-10 RX ADMIN — ACETAMINOPHEN 650 MG: 325 TABLET ORAL at 10:37

## 2024-12-10 RX ADMIN — MIDAZOLAM 2 MG: 1 INJECTION INTRAMUSCULAR; INTRAVENOUS at 16:20

## 2024-12-10 RX ADMIN — DONEPEZIL HYDROCHLORIDE 10 MG: 10 TABLET ORAL at 23:35

## 2024-12-10 RX ADMIN — LIDOCAINE HYDROCHLORIDE 40 MG: 20 INJECTION, SOLUTION EPIDURAL; INFILTRATION; INTRACAUDAL; PERINEURAL at 16:30

## 2024-12-10 RX ADMIN — PROPOFOL 100 MG: 10 INJECTION, EMULSION INTRAVENOUS at 16:30

## 2024-12-10 RX ADMIN — LACOSAMIDE 200 MG: 100 TABLET, FILM COATED ORAL at 10:35

## 2024-12-10 RX ADMIN — LABETALOL HYDROCHLORIDE 5 MG: 5 INJECTION, SOLUTION INTRAVENOUS at 18:18

## 2024-12-10 RX ADMIN — MEMANTINE 10 MG: 10 TABLET ORAL at 23:36

## 2024-12-10 RX ADMIN — ACETAMINOPHEN 650 MG: 325 TABLET ORAL at 23:36

## 2024-12-10 RX ADMIN — ATORVASTATIN CALCIUM 40 MG: 40 TABLET, FILM COATED ORAL at 23:36

## 2024-12-10 RX ADMIN — ROCURONIUM BROMIDE 50 MG: 10 INJECTION, SOLUTION INTRAVENOUS at 16:30

## 2024-12-10 RX ADMIN — FOLIC ACID 0.5 MG: 1 TABLET ORAL at 10:35

## 2024-12-10 RX ADMIN — Medication 0.2 MG: at 16:35

## 2024-12-10 RX ADMIN — SODIUM CHLORIDE, PRESERVATIVE FREE 10 ML: 5 INJECTION INTRAVENOUS at 23:37

## 2024-12-10 RX ADMIN — Medication 100 MG: at 10:41

## 2024-12-10 RX ADMIN — Medication 0.1 MG: at 17:31

## 2024-12-10 RX ADMIN — SODIUM CHLORIDE, POTASSIUM CHLORIDE, SODIUM LACTATE AND CALCIUM CHLORIDE: 600; 310; 30; 20 INJECTION, SOLUTION INTRAVENOUS at 16:26

## 2024-12-10 RX ADMIN — TRANEXAMIC ACID 1000 MG: 10 INJECTION, SOLUTION INTRAVENOUS at 16:44

## 2024-12-10 RX ADMIN — Medication 0.2 MG: at 16:40

## 2024-12-10 RX ADMIN — LEVETIRACETAM 1500 MG: 500 TABLET, FILM COATED ORAL at 23:35

## 2024-12-10 RX ADMIN — FENTANYL CITRATE 100 MCG: 50 INJECTION, SOLUTION INTRAMUSCULAR; INTRAVENOUS at 16:28

## 2024-12-10 RX ADMIN — Medication 0.1 MG: at 16:55

## 2024-12-10 RX ADMIN — OXYCODONE HYDROCHLORIDE 10 MG: 10 TABLET ORAL at 23:36

## 2024-12-10 RX ADMIN — MELATONIN TAB 3 MG 6 MG: 3 TAB at 23:35

## 2024-12-10 ASSESSMENT — PAIN DESCRIPTION - LOCATION
LOCATION: HIP
LOCATION: HIP

## 2024-12-10 ASSESSMENT — PAIN SCALES - GENERAL
PAINLEVEL_OUTOF10: 10
PAINLEVEL_OUTOF10: 8
PAINLEVEL_OUTOF10: 0

## 2024-12-10 ASSESSMENT — PAIN DESCRIPTION - ORIENTATION
ORIENTATION: LEFT
ORIENTATION: LEFT

## 2024-12-10 ASSESSMENT — PAIN - FUNCTIONAL ASSESSMENT
PAIN_FUNCTIONAL_ASSESSMENT: PREVENTS OR INTERFERES SOME ACTIVE ACTIVITIES AND ADLS
PAIN_FUNCTIONAL_ASSESSMENT: 0-10

## 2024-12-10 ASSESSMENT — PAIN DESCRIPTION - DESCRIPTORS
DESCRIPTORS: ACHING;HEAVINESS
DESCRIPTORS: ACHING;DISCOMFORT

## 2024-12-10 NOTE — ED NOTES
ED TO INPATIENT SBAR HANDOFF    Patient Name: Brent Degroot   :  1960  64 y.o.   Preferred Name  Brent  Family/Caregiver Present no   Restraints no   C-SSRS: Risk of Suicide: No Risk  Sitter no   Sepsis Risk Score        Situation  Chief Complaint   Patient presents with    Fall     Left hip injury     Brief Description of Patient's Condition: Pt had a fall and fractured his left femur, pt states that he missed the last step and fell. Pt is on Plavix and has a history of CVA and dementia.   Mental Status: oriented  Arrived from: home    Imaging:   XR CHEST PORTABLE   Final Result   1.  No acute radiographic findings in the chest.         Electronically signed by Janice Fisher      XR HIP 2-3 VW W PELVIS LEFT   Final Result   Intertrochanteric fracture of the left femur. No dislocation.         Electronically signed by Miranda Frazier      XR FEMUR LEFT (MIN 2 VIEWS)    (Results Pending)     Abnormal labs:   Abnormal Labs Reviewed   CBC WITH AUTO DIFFERENTIAL - Abnormal; Notable for the following components:       Result Value    RBC 3.59 (*)     Hemoglobin 11.9 (*)     Hematocrit 36.3 (*)     .1 (*)     MCH 33.1 (*)     Neutrophils % 70 (*)     Lymphocytes % 22 (*)     Monocytes % 7 (*)     All other components within normal limits   BASIC METABOLIC PANEL - Abnormal; Notable for the following components:    Glucose 126 (*)     Creatinine 0.7 (*)     All other components within normal limits        Background  History:   Past Medical History:   Diagnosis Date    Cerebral artery occlusion with cerebral infarction (HCC)     Hypertension     Seizures (HCC)        Assessment    Vitals: MEWS Score: 1  Level of Consciousness: Alert (0)   Vitals:    24 1832 24 1833 24 1930 24 194   BP: (!) 167/104  (!) 182/87 (!) 182/87   Pulse: 60  61 59   Resp: 16  16 16   Temp: 97.8 °F (36.6 °C)      TempSrc: Oral      SpO2:  96% 100% 92%   Weight: 46.7 kg (103 lb)      Height: 1.575 m (5' 2\")

## 2024-12-10 NOTE — BRIEF OP NOTE
Brief Postoperative Note      Patient: Brent Degroot  YOB: 1960  MRN: [id number]    Date of Procedure: 12/10/2024    Pre-Op Diagnosis Codes:      * Closed intertrochanteric fracture of hip, left, initial encounter ([facility]) [S72.142A]    Post-Op Diagnosis: Same       Procedure(s):  HIP INTRAMEDULLARY NAIL YI INSERTION    Surgeon(s):  Cole Jones MD    Assistant:  * No surgical staff found *    Anesthesia: Choice    Estimated Blood Loss (mL): 100mL    Complications: None    Specimens:   * No specimens in log *    Implants:  Implant Name Type Inv. Item Serial No.  Lot No. LRB No. Used Action   NAIL IM L170MM [id number] NK 125DEG SHT PROX FEM GRN TI-15MO - [id number]  NAIL IM L170MM [id number] NK 125DEG SHT PROX FEM GRN TI-15MO  DEPU[employer] [place]- [id number] Left 1 Implanted   BLADE IM L80MM DIA10.35MM PROX FEM G TI ESTER FEN MAGED FOR - [id number]  BLADE IM L80MM DIA10.35MM PROX FEM G TI ESTER FEN MAGED FOR  DEPU[employer] [place]- [id number] Left 1 Implanted   SCREW BNE L34MM DIA5MM TIB LT GRN TI ST ESTER JUAN A FULL THRD - [id number]  SCREW BNE L34MM DIA5MM TIB LT GRN TI ST ESTER JUAN A FULL THRD  DEPU[employer] [place]- [id number] Left 1 Implanted         Drains: * No LDAs found *    Findings:  Infection Present At Time Of Surgery (PATOS) (choose all levels that have infection present):  No infection present    Electronically signed by Cole Jones MD on 12/10/2024 at 5:34 PM

## 2024-12-10 NOTE — OP NOTE
OPERATIVE NOTE    PROCEDURE DATE: 12/10/2024    SURGEON: Surgeons and Role:     * Cole Jones MD - Primary    PREOPERATIVE DIAGNOSIS: Left hip intertrochanteric femur fracture    POSTOPERATIVE DIAGNOSIS: Same    OPERATIONS:  Left hip intramedullary nail    ESTIMATED BLOOD LOSS: 100 mL    ANAESTHESIA TYPE: General/regional    SPECIMENS: * No specimens in log *    IMPLANTS:   Implant Name Type Inv. Item Serial No.  Lot No. LRB No. Used Action   NAIL IM L170MM [id number] NK 125DEG SHT PROX FEM GRN TI-15MO - [id number]  NAIL IM L170MM [id number] NK 125DEG SHT PROX FEM GRN TI-15MO  DEPU[employer] [place]-WD [id number] Left 1 Implanted   BLADE IM L80MM DIA10.35MM PROX FEM G TI ESTER FEN MAGED FOR - [id number]  BLADE IM L80MM DIA10.35MM PROX FEM G TI ESTER FEN MAGED FOR  DEPU[employer] [place]-WD [id number] Left 1 Implanted   SCREW BNE L34MM DIA5MM TIB LT GRN TI ST ESTER JUAN A FULL THRD - [id number]  SCREW BNE L34MM DIA5MM TIB LT GRN TI ST ESTER JUAN A FULL THRD  DEPU[employer] [place]-WD [id number] Left 1 Implanted       COMPLICATIONS: None    DISPOSITION:  To PACU in stable condition    BRIEF HISTORY AND INDICATION:  Brent Degroot is a 64 y.o. male w/ PMH sig for seizure disorder, CVA, and HTN presents to [facility] following a fall from standing height complaining of left hip pain. Pt experienced immediate pain in the left hip and was unable to ambulate following the fall. Pt denies pain elsewhere in the body. Denies numbness/tingling of the LLE. Pt is normally ambulatory at baseline without assistive device. Pt denies blood thinner use. Denies tobacco/EtOH/illicits.    It was discussed with the patient and family members about the nature, extent, and severity of their injuries and overall prognosis and treatment consideration including conservative and surgical.  Anticipated course of benefits, possible risks and complications were explained. All questions answered.  No guarantees implied or given. Informed

## 2024-12-10 NOTE — H&P
History and Physical      Name:  Brent Degroot /Age/Sex: 1960  (64 y.o. male)   MRN & CSN:  [id number] & [id number] Encounter Date/Time: 2024 8:22 PM   Location:  [facility]/04TR-04 PCP: Damon Esuqivel MD       Hospital Day: 1    Assessment and Plan:     Patient is a 64-year-old male who presented with fall.     # Closed left hip fracture  - Reported missing last step going down stairs leading to fall on left hip. No head trauma or LOC. Not on anticoagulants, but on Plavix.   - NVI distally. XR showed intertrochanteric fracture of left femur without dislocation.  - ED provider discussed with Ortho, keep NPO past midnight. Supportive care.    # Elevated blood pressure  - Not on antihypertensive meds at home.   - Ensure adequate pain control first, consider starting antihypertensive med if no improvement.      # Hx of CVA with left-sided residual deficits  - Hold Plavix for surgery, continue Liptior.     # Seizure disorder  - Continue Depakote, Vimpat and Keppra.      # Advanced dementia without behavioral disturbances  - A&Ox3 in ED, attentive.  - Continue Aricept and Naemenda with delirium precautions, avoid benzodiazepines and anticholinergic medications.    # Depression  - Continue Zoloft.    Checklist:  Advanced care planning: full  Diet: NPO past midnight pending surgical evaluation  DVT ppx: Lovenox (held for surgery)    Disposition: admit to inpatient.  Estimated discharge: 2-3 day(s).  Current living situation: home.  Expected disposition: TBD.      Spoke with ED provider who recommended admission for the patient and I agree with that plan.  Personally reviewed lab studies and imaging.  EKG interpreted personally and results as stated above.  Imaging that was interpreted personally and results as stated above.    History of Present Illness:     Chief Complaint: fall    Patient is a 64-year-old male with a PMHx of CVA with left-sided residual deficits, seizure disorder, advanced  of children: None    Years of education: None    Highest education level: None   Tobacco Use    Smoking status: Former     Current packs/day: 0.00     Average packs/day: 0.3 packs/day for 37.0 years (9.2 ttl pk-yrs)     Types: Cigarettes     Start date:      Quit date: 2017     Years since quittin.9    Smokeless tobacco: Never   Vaping Use    Vaping status: Never Used   Substance and Sexual Activity    Alcohol use: Yes    Drug use: Not Currently     Types: Marijuana (Weed)     Comment: OCCASIONAL     Social Determinants of Health     Food Insecurity: No Food Insecurity (4/3/2019)    Received from [employer]    Hunger Vital Sign     Worried About Running Out of Food in the Last Year: Never true     Ran Out of Food in the Last Year: Never true   Transportation Needs: No Transportation Needs (4/3/2019)    Received from [employer]    PRAPARE - Transportation     Lack of Transportation (Medical): No     Lack of Transportation (Non-Medical): No   Physical Activity: Inactive (4/3/2019)    Received from [employer]    Exercise Vital Sign     Days of Exercise per Week: 0 days     Minutes of Exercise per Session: 0 min       Medications Prior to Admission     Prior to Admission medications    Medication Sig Start Date End Date Taking? Authorizing Provider   atorvastatin (LIPITOR) 40 MG tablet Take 1 tablet by mouth daily 24   Castro Newton APRN - CNP   clopidogrel (PLAVIX) 75 MG tablet Take 1 tablet by mouth daily 24   Castro Newton APRN - CNP   divalproex (DEPAKOTE) 500 MG DR tablet Take 1 tablet by mouth in the morning and 1 tablet in the evening. 24   Castro Newton APRN - CNP   donepezil (ARICEPT) 10 MG tablet Take 1 tablet by mouth nightly 24   Castro Newton APRN - CNP   lacosamide (VIMPAT) 200 MG tablet Take 1 tablet by mouth 2 times daily for 182 days. Max Daily Amount: 400 mg 24

## 2024-12-10 NOTE — PROGRESS NOTES
1755: pt arrived to PACU. Monitors applied and alarms on. Report from Elizabeth BURK and Francois BOYLE. X 4 incision sites present. SANTANA due to aquacel dressing. Pt drwosy on arrival. No concerns noted.   1815: pt awake and answering questions. Pt following commands.   1820: 5 mg Labetalol given for bp

## 2024-12-10 NOTE — DISCHARGE INSTRUCTIONS
Orthopedic Surgery Discharge Instructions    Surgery to Repair a Hip Fracture  Your Recovery  Surgery for a hip fracture repairs a broken hip bone. When you leave the hospital after surgery, you will probably be walking with crutches or a walker. You may be able to climb a few stairs and get in and out of bed and chairs. But you will need someone to help you at home for the next few weeks or until you have more energy and can move around better. If there is no one to help you at home, you may go to a rehabilitation center or long-term care center.  You will leave the hospital with a new surgical incision and bandage. If your incision is closed with sutures (stitches) or staples, they will likely be removed during your follow-up appointment approximately 1 - 3 weeks after your surgery. You may still have some mild pain, and the area may be swollen for 3 to 4 months after surgery. Continue reading for more detailed wound care instructions.  You will continue the rehabilitation program (rehab) you started in the hospital. The better you do with your rehab exercises, the quicker you will get your strength and movement back. Most people are able to return to work 4 weeks to 4 months after surgery, but it may take 6 months to 1 year for you to fully recover. Some people, especially older people, are never able to move quite as well as they used to.  You heal best when you take good care of yourself. Eat a variety of healthy foods, and don't smoke.  Each person recovers at a different pace. Follow the steps below to get better as quickly as possible.    Follow-Up Appointments:   Your follow up appointment has been scheduled. Please refer to this section of your discharge paperwork for your date and time.  If there is an emergency and you are unable to reach anyone in the office, please go straight to the emergency room.  Follow-up care is a key part of your treatment and safety. Be sure to make and go to all appointments,

## 2024-12-10 NOTE — PROGRESS NOTES
4 Eyes Skin Assessment     NAME:  Brent Degroot  YOB: 1960  MEDICAL RECORD NUMBER:  [id number]    The patient is being assessed for  Admission    I agree that at least one RN has performed a thorough Head to Toe Skin Assessment on the patient. ALL assessment sites listed below have been assessed.      Areas assessed by both nurses:    Head, Face, Ears, Shoulders, Back, Chest, Arms, Elbows, Hands, Sacrum. Buttock, Coccyx, Ischium, and Legs. Feet and Heels        Does the Patient have a Wound? No noted wound(s)       Brady Prevention initiated by RN: No  Wound Care Orders initiated by RN: No    Pressure Injury (Stage 3,4, Unstageable, DTI, NWPT, and Complex wounds) if present, place Wound referral order by RN under : No    New Ostomies, if present place, Ostomy referral order under : No     Nurse 1 eSignature: Electronically signed by Yvrose Mabry RN on 12/9/24 at 11:04 PM EST    **SHARE this note so that the co-signing nurse can place an eSignature**    Nurse 2 eSignature: Electronically signed by Mirna Moscoso LPN on 12/10/24 at 12:19 AM EST

## 2024-12-10 NOTE — ANESTHESIA PRE PROCEDURE
MD Felix   100 mg at 12/10/24 1041   • sertraline (ZOLOFT) tablet 200 mg  200 mg Oral Daily Felix Borrego MD   200 mg at 12/10/24 1035   • sodium chloride flush 0.9 % injection 5-40 mL  5-40 mL IntraVENous 2 times per day Felix Borrego MD   10 mL at 12/10/24 1045   • sodium chloride flush 0.9 % injection 5-40 mL  5-40 mL IntraVENous PRN Felix Borrego MD       • 0.9 % sodium chloride infusion   IntraVENous PRN Felix Borrego MD       • [Held by provider] enoxaparin Sodium (LOVENOX) injection 30 mg  30 mg SubCUTAneous QPM Felix Borrego MD       • ondansetron (ZOFRAN-ODT) disintegrating tablet 4 mg  4 mg Oral Q8H PRN Felix Borrego MD        Or   • ondansetron (ZOFRAN) injection 4 mg  4 mg IntraVENous Q6H PRN Felix Borrego MD       • polyethylene glycol (GLYCOLAX) packet 17 g  17 g Oral Daily PRN Felix Borrego MD       • melatonin tablet 6 mg  6 mg Oral Nightly Felix Borrego MD   6 mg at 12/09/24 2257   • acetaminophen (TYLENOL) tablet 650 mg  650 mg Oral 4 times per day Felix Borrego MD   650 mg at 12/10/24 1037   • oxyCODONE (ROXICODONE) immediate release tablet 5 mg  5 mg Oral Q4H PRN Felix Borrego MD        Or   • oxyCODONE HCl (OXY-IR) immediate release tablet 10 mg  10 mg Oral Q4H PRN Felix Borrego MD   10 mg at 12/10/24 1036   • folic acid (FOLVITE) tablet 0.5 mg  0.5 mg Oral Daily Felix Borrego MD   0.5 mg at 12/10/24 1035       Allergies:    Allergies   Allergen Reactions   • Shellfish-Derived Products        Problem List:    Patient Active Problem List   Diagnosis Code   • Delirium tremens (HCC) F10.931   • Hyperbilirubinemia E80.6   • Former smoker Z87.891   • Acute encephalopathy G93.40   • Gait disturbance R26.9   • Hemiparesis of left nondominant side due to cerebral infarction [id number]   • Alcoholism (HCC) F10.20   • Cerebrovascular accident (CVA) due to thrombosis of right middle cerebral artery (HCC) I63.311   • Vision changes H53.9   •

## 2024-12-10 NOTE — CONSULTS
[facility]    ORTHOPEDIC SURGERY CONSULT    Name:  Brent Degroot  Date/Time of Admission: 2024  6:27 PM   CSN: [id number]  Attending Provider: Jhon Jasso MD   Room/Bed:  OR/NONE  : 1960  64 y.o.        Subjective:   HPI:  Brent Degroot is a 64 y.o. male w/ PMH sig for seizure disorder, CVA, and HTN presents to [facility] following a fall from standing height complaining of left hip pain. Pt experienced immediate pain in the left hip and was unable to ambulate following the fall. Pt denies pain elsewhere in the body. Denies numbness/tingling of the LLE. Pt is normally ambulatory at baseline without assistive device. Pt denies blood thinner use. Denies tobacco/EtOH/illicits.    ROS:   Negative except as otherwise stated in HPI.    Primary Care Physician:  Damon Esquivel MD     Allergies:    Allergies   Allergen Reactions    Shellfish-Derived Products         Outpatient Meds:  Medications Prior to Admission: atorvastatin (LIPITOR) 40 MG tablet, Take 1 tablet by mouth daily  clopidogrel (PLAVIX) 75 MG tablet, Take 1 tablet by mouth daily  divalproex (DEPAKOTE) 500 MG DR tablet, Take 1 tablet by mouth in the morning and 1 tablet in the evening.  donepezil (ARICEPT) 10 MG tablet, Take 1 tablet by mouth nightly  lacosamide (VIMPAT) 200 MG tablet, Take 1 tablet by mouth 2 times daily for 182 days. Max Daily Amount: 400 mg  levETIRAcetam (KEPPRA) 750 MG tablet, Take 2 tablets by mouth 2 times daily  memantine (NAMENDA) 10 MG tablet, Take 1 tablet by mouth 2 times daily NOTE TO PHARMACY: DO NOT FILL UNTIL 5 MG RX IS COMPLETED  Ascorbic Acid (VITAMIN C PO), Take by mouth daily  KRILL OIL PO, Take by mouth daily  pyridoxine (B-6) 100 MG tablet, Take 1 tablet by mouth daily  Cyanocobalamin (B-12) 1000 MCG TABS, Take 1,000 mcg by mouth 2 times daily   folic acid (FOLVITE) 400 MCG tablet, Take 1 tablet by mouth daily  Multiple Vitamins-Minerals  true     Ran Out of Food in the Last Year: Never true   Transportation Needs: No Transportation Needs (12/10/2024)    PRAPARE - Transportation     Lack of Transportation (Medical): No     Lack of Transportation (Non-Medical): No   Physical Activity: Inactive (4/3/2019)    Received from [facility]    Exercise Vital Sign     Days of Exercise per Week: 0 days     Minutes of Exercise per Session: 0 min   Stress: Not on file   Social Connections: Not on file   Intimate Partner Violence: Not on file   Housing Stability: Low Risk  (12/10/2024)    Housing Stability Vital Sign     Unable to Pay for Housing in the Last Year: No     Number of Times Moved in the Last Year: 1     Homeless in the Last Year: No       Family History   Problem Relation Age of Onset    Stroke Sister     Heart Disease Brother     Heart Failure Mother     Diabetes Mother     Heart Failure Father          Objective:   Vital Signs:  Vitals:    12/10/24 1451   BP: 128/82   Pulse: (!) 102   Resp: 16   Temp:    SpO2: 93%       EXAM:  Gen: AOx3, NAD, cooperative, follows commands  Head atraumatic, normocephalic  CV: Appropriate perfusion to distal extremities B/L  Pulm: Non-labored respirations  Abdomen: Non-distended.  CNS: Sensation and motor function grossly intact.  Skin: No rash or erythema    Msk:   RUE: Deformities not noted. No apparent skin lesions, swelling, erythema, or ecchymosis. Non-tender to palpation of clavicle, shoulder, arm, elbow, forearm, wrist, or hand. Range of motion in shoulder, elbow, wrist, and MCP joints within functional range. Motor function and sensation intact in axillary, median, ulnar and radial nerve distributions. Radial pulse 2+ and brisk capillary refill.     LUE: Deformities not noted. No apparent skin lesions, swelling, erythema, or ecchymosis. Non-tender to palpation of clavicle, shoulder, arm, elbow, forearm, wrist, or hand. Range of motion in shoulder, elbow, wrist, and MCP joints within

## 2024-12-10 NOTE — PROGRESS NOTES
V2.0    [facility] Progress Note      Name:  Brent Degroot /Age/Sex: 1960  (64 y.o. male)   MRN & CSN:  [id number] & [id number] Encounter Date/Time: 12/10/2024 11:51 AM EST   Location:  [facility]-A PCP: Damon Esquivel MD     Attending:Jhon Jasso MD       Hospital Day: 2    Assessment and Recommendations   Brent Degroot is a 64 y.o. male who presents with Closed left hip fracture, initial encounter ([facility])      Plan:     # Closed left hip fracture  - Reported missing last step going down stairs leading to fall on left hip. No head trauma or LOC. Not on anticoagulants, but on Plavix.   - NVI distally. XR showed intertrochanteric fracture of left femur without dislocation.  - Ortho consulted. Plan for surgical intervention this evening.      # Elevated blood pressure  - Not on antihypertensive meds at home.   - Ensure adequate pain control first, consider starting antihypertensive med if no improvement.       # Hx of CVA with left-sided residual deficits  - Hold Plavix for surgery, continue Liptior.      # Seizure disorder  - Continue Depakote, Vimpat and Keppra.       # Advanced dementia without behavioral disturbances  - A&Ox3 in ED, attentive.  - Continue Aricept and Naemenda with delirium precautions, avoid benzodiazepines and anticholinergic medications.     # Depression  - Continue Zoloft.      Diet Diet NPO Exceptions are: Ice Chips, Sips of Water with Meds   DVT Prophylaxis [] Lovenox, []  Heparin, [x] SCDs, [] Ambulation,  [] Eliquis, [] Xarelto  [] Coumadin   Code Status Full Code   Disposition From: home  Expected Disposition: hhc vs snf  Estimated Date of Discharge: 2- days  Patient requires continued admission due to hip fractures   Surrogate Decision Maker/ Isac Patton (Brother/Sister)        Personally reviewed Lab Studies and Imaging       Imaging that was interpreted personally includes hip xr and results Intertrochanteric fracture of the left femur  09:05 AM    BILIRUBINUR NEGATIVE 10/17/2018 09:05 AM    BLOODU NEGATIVE 10/17/2018 09:05 AM    GLUCOSEU NEGATIVE 10/17/2018 09:05 AM    KETUA NEGATIVE 10/17/2018 09:05 AM       Electronically signed by Jhon Jasso MD on 12/10/2024 at 11:51 AM

## 2024-12-10 NOTE — ANESTHESIA POSTPROCEDURE EVALUATION
Department of Anesthesiology  Postprocedure Note    Patient: Brent Degroot  MRN: [id number]  YOB: 1960  Date of evaluation: 12/10/2024    Procedure Summary       Date: 12/10/24 Room / Location: [address]    Anesthesia Start: 1626 Anesthesia Stop: 1758    Procedure: HIP INTRAMEDULLARY NAIL YI INSERTION (Left: Hip) Diagnosis:       Closed intertrochanteric fracture of hip, left, initial encounter ([facility])      (Closed intertrochanteric fracture of hip, left, initial encounter ([facility]) [S72.142A])    Surgeons: Cole Jones MD Responsible Provider: Roslyn Levy MD    Anesthesia Type: General, Regional ASA Status: Not recorded            Anesthesia Type: General, Regional    Lorin Phase I: Lorin Score: 7    Lorin Phase II:      Anesthesia Post Evaluation    Patient location during evaluation: PACU  Patient participation: complete - patient participated  Level of consciousness: sleepy but conscious  Airway patency: patent  Nausea & Vomiting: no nausea and no vomiting  Cardiovascular status: hemodynamically stable  Respiratory status: acceptable, face mask and spontaneous ventilation  Hydration status: stable  Pain management: adequate    No notable events documented.

## 2024-12-11 LAB
ANION GAP SERPL CALCULATED.3IONS-SCNC: 9 MMOL/L (ref 9–17)
BASOPHILS # BLD: 0.04 K/UL
BASOPHILS NFR BLD: 0 % (ref 0–1)
BUN SERPL-MCNC: 18 MG/DL (ref 7–20)
CALCIUM SERPL-MCNC: 8.4 MG/DL (ref 8.3–10.6)
CHLORIDE SERPL-SCNC: 102 MMOL/L (ref 99–110)
CO2 SERPL-SCNC: 28 MMOL/L (ref 21–32)
CREAT SERPL-MCNC: 0.9 MG/DL (ref 0.8–1.3)
EOSINOPHIL # BLD: 0.01 K/UL
EOSINOPHILS RELATIVE PERCENT: 0 % (ref 0–3)
ERYTHROCYTE [DISTWIDTH] IN BLOOD BY AUTOMATED COUNT: 13.7 % (ref 11.7–14.9)
GFR, ESTIMATED: 81 ML/MIN/1.73M2
GLUCOSE SERPL-MCNC: 137 MG/DL (ref 74–99)
HCT VFR BLD AUTO: 23 % (ref 42–52)
HGB BLD-MCNC: 7.4 G/DL (ref 13.5–18)
IMM GRANULOCYTES # BLD AUTO: 0.04 K/UL
IMM GRANULOCYTES NFR BLD: 0 %
LYMPHOCYTES NFR BLD: 1.95 K/UL
LYMPHOCYTES RELATIVE PERCENT: 17 % (ref 24–44)
MCH RBC QN AUTO: 33.3 PG (ref 27–31)
MCHC RBC AUTO-ENTMCNC: 32.2 G/DL (ref 32–36)
MCV RBC AUTO: 103.6 FL (ref 78–100)
MONOCYTES NFR BLD: 1.35 K/UL
MONOCYTES NFR BLD: 12 % (ref 0–4)
NEUTROPHILS NFR BLD: 70 % (ref 36–66)
NEUTS SEG NFR BLD: 7.91 K/UL
PLATELET # BLD AUTO: 197 K/UL (ref 140–440)
PMV BLD AUTO: 10.4 FL (ref 7.5–11.1)
POTASSIUM SERPL-SCNC: 3.8 MMOL/L (ref 3.5–5.1)
RBC # BLD AUTO: 2.22 M/UL (ref 4.6–6.2)
SODIUM SERPL-SCNC: 139 MMOL/L (ref 136–145)
WBC OTHER # BLD: 11.3 K/UL (ref 4–10.5)

## 2024-12-11 PROCEDURE — 6360000002 HC RX W HCPCS: Performed by: STUDENT IN AN ORGANIZED HEALTH CARE EDUCATION/TRAINING PROGRAM

## 2024-12-11 PROCEDURE — 2700000000 HC OXYGEN THERAPY PER DAY

## 2024-12-11 PROCEDURE — 6370000000 HC RX 637 (ALT 250 FOR IP): Performed by: STUDENT IN AN ORGANIZED HEALTH CARE EDUCATION/TRAINING PROGRAM

## 2024-12-11 PROCEDURE — 2580000003 HC RX 258: Performed by: STUDENT IN AN ORGANIZED HEALTH CARE EDUCATION/TRAINING PROGRAM

## 2024-12-11 PROCEDURE — 36415 COLL VENOUS BLD VENIPUNCTURE: CPT

## 2024-12-11 PROCEDURE — 51798 US URINE CAPACITY MEASURE: CPT

## 2024-12-11 PROCEDURE — 97162 PT EVAL MOD COMPLEX 30 MIN: CPT

## 2024-12-11 PROCEDURE — 94664 DEMO&/EVAL PT USE INHALER: CPT

## 2024-12-11 PROCEDURE — 1200000000 HC SEMI PRIVATE

## 2024-12-11 PROCEDURE — 97166 OT EVAL MOD COMPLEX 45 MIN: CPT

## 2024-12-11 PROCEDURE — 97530 THERAPEUTIC ACTIVITIES: CPT

## 2024-12-11 PROCEDURE — 94150 VITAL CAPACITY TEST: CPT

## 2024-12-11 PROCEDURE — 97535 SELF CARE MNGMENT TRAINING: CPT

## 2024-12-11 PROCEDURE — 80048 BASIC METABOLIC PNL TOTAL CA: CPT

## 2024-12-11 PROCEDURE — 85025 COMPLETE CBC W/AUTO DIFF WBC: CPT

## 2024-12-11 PROCEDURE — 94761 N-INVAS EAR/PLS OXIMETRY MLT: CPT

## 2024-12-11 RX ORDER — OXYCODONE HYDROCHLORIDE 5 MG/1
5 TABLET ORAL EVERY 6 HOURS PRN
Qty: 28 TABLET | Refills: 0
Start: 2024-12-11 | End: 2024-12-18

## 2024-12-11 RX ORDER — OXYCODONE HYDROCHLORIDE 10 MG/1
10 TABLET ORAL EVERY 4 HOURS PRN
Status: DISCONTINUED | OUTPATIENT
Start: 2024-12-11 | End: 2024-12-19 | Stop reason: HOSPADM

## 2024-12-11 RX ORDER — ASPIRIN 81 MG/1
81 TABLET ORAL 2 TIMES DAILY
Qty: 60 TABLET | Refills: 0 | Status: ON HOLD
Start: 2024-12-11

## 2024-12-11 RX ORDER — SENNA AND DOCUSATE SODIUM 50; 8.6 MG/1; MG/1
1 TABLET, FILM COATED ORAL 2 TIMES DAILY
Status: DISCONTINUED | OUTPATIENT
Start: 2024-12-11 | End: 2024-12-19 | Stop reason: HOSPADM

## 2024-12-11 RX ORDER — SODIUM CHLORIDE 0.9 % (FLUSH) 0.9 %
5-40 SYRINGE (ML) INJECTION PRN
Status: DISCONTINUED | OUTPATIENT
Start: 2024-12-11 | End: 2024-12-19 | Stop reason: HOSPADM

## 2024-12-11 RX ORDER — ACETAMINOPHEN 500 MG
1000 TABLET ORAL 3 TIMES DAILY
Qty: 180 TABLET | Refills: 0 | Status: ON HOLD
Start: 2024-12-11

## 2024-12-11 RX ORDER — SODIUM CHLORIDE 9 MG/ML
INJECTION, SOLUTION INTRAVENOUS PRN
Status: DISCONTINUED | OUTPATIENT
Start: 2024-12-11 | End: 2024-12-19 | Stop reason: HOSPADM

## 2024-12-11 RX ORDER — SODIUM CHLORIDE 0.9 % (FLUSH) 0.9 %
5-40 SYRINGE (ML) INJECTION EVERY 12 HOURS SCHEDULED
Status: DISCONTINUED | OUTPATIENT
Start: 2024-12-11 | End: 2024-12-19 | Stop reason: HOSPADM

## 2024-12-11 RX ORDER — OXYCODONE HYDROCHLORIDE 5 MG/1
5 TABLET ORAL EVERY 4 HOURS PRN
Status: DISCONTINUED | OUTPATIENT
Start: 2024-12-11 | End: 2024-12-19 | Stop reason: HOSPADM

## 2024-12-11 RX ORDER — 0.9 % SODIUM CHLORIDE 0.9 %
500 INTRAVENOUS SOLUTION INTRAVENOUS ONCE
Status: COMPLETED | OUTPATIENT
Start: 2024-12-11 | End: 2024-12-11

## 2024-12-11 RX ADMIN — ACETAMINOPHEN 650 MG: 325 TABLET ORAL at 18:59

## 2024-12-11 RX ADMIN — Medication 100 MG: at 12:53

## 2024-12-11 RX ADMIN — WATER 2000 MG: 1 INJECTION INTRAMUSCULAR; INTRAVENOUS; SUBCUTANEOUS at 12:39

## 2024-12-11 RX ADMIN — MELATONIN TAB 3 MG 6 MG: 3 TAB at 21:16

## 2024-12-11 RX ADMIN — OXYCODONE HYDROCHLORIDE 10 MG: 10 TABLET ORAL at 12:41

## 2024-12-11 RX ADMIN — DIVALPROEX SODIUM 500 MG: 500 TABLET, DELAYED RELEASE ORAL at 12:53

## 2024-12-11 RX ADMIN — DONEPEZIL HYDROCHLORIDE 10 MG: 10 TABLET ORAL at 21:16

## 2024-12-11 RX ADMIN — ACETAMINOPHEN 650 MG: 325 TABLET ORAL at 12:40

## 2024-12-11 RX ADMIN — SENNOSIDES AND DOCUSATE SODIUM 1 TABLET: 50; 8.6 TABLET ORAL at 21:16

## 2024-12-11 RX ADMIN — MEMANTINE 10 MG: 10 TABLET ORAL at 12:39

## 2024-12-11 RX ADMIN — SODIUM CHLORIDE, PRESERVATIVE FREE 10 ML: 5 INJECTION INTRAVENOUS at 21:19

## 2024-12-11 RX ADMIN — SENNOSIDES AND DOCUSATE SODIUM 1 TABLET: 50; 8.6 TABLET ORAL at 12:41

## 2024-12-11 RX ADMIN — SODIUM CHLORIDE, PRESERVATIVE FREE 10 ML: 5 INJECTION INTRAVENOUS at 12:42

## 2024-12-11 RX ADMIN — SODIUM CHLORIDE, PRESERVATIVE FREE 10 ML: 5 INJECTION INTRAVENOUS at 21:09

## 2024-12-11 RX ADMIN — ATORVASTATIN CALCIUM 40 MG: 40 TABLET, FILM COATED ORAL at 21:16

## 2024-12-11 RX ADMIN — CLOPIDOGREL BISULFATE 75 MG: 75 TABLET ORAL at 12:40

## 2024-12-11 RX ADMIN — SODIUM CHLORIDE, PRESERVATIVE FREE 10 ML: 5 INJECTION INTRAVENOUS at 12:41

## 2024-12-11 RX ADMIN — MEMANTINE 10 MG: 10 TABLET ORAL at 21:16

## 2024-12-11 RX ADMIN — Medication 1000 MCG: at 21:16

## 2024-12-11 RX ADMIN — LEVETIRACETAM 1500 MG: 500 TABLET, FILM COATED ORAL at 12:40

## 2024-12-11 RX ADMIN — LACOSAMIDE 200 MG: 100 TABLET, FILM COATED ORAL at 12:40

## 2024-12-11 RX ADMIN — SODIUM CHLORIDE 500 ML: 9 INJECTION, SOLUTION INTRAVENOUS at 15:52

## 2024-12-11 RX ADMIN — OXYCODONE HYDROCHLORIDE 10 MG: 10 TABLET ORAL at 18:58

## 2024-12-11 RX ADMIN — WATER 2000 MG: 1 INJECTION INTRAMUSCULAR; INTRAVENOUS; SUBCUTANEOUS at 18:57

## 2024-12-11 RX ADMIN — DIVALPROEX SODIUM 500 MG: 500 TABLET, DELAYED RELEASE ORAL at 18:59

## 2024-12-11 RX ADMIN — ACETAMINOPHEN 650 MG: 325 TABLET ORAL at 06:23

## 2024-12-11 RX ADMIN — ENOXAPARIN SODIUM 30 MG: 100 INJECTION SUBCUTANEOUS at 18:59

## 2024-12-11 RX ADMIN — OXYCODONE HYDROCHLORIDE 10 MG: 10 TABLET ORAL at 06:23

## 2024-12-11 RX ADMIN — SERTRALINE HYDROCHLORIDE 200 MG: 50 TABLET ORAL at 12:40

## 2024-12-11 RX ADMIN — LACOSAMIDE 200 MG: 100 TABLET, FILM COATED ORAL at 21:16

## 2024-12-11 RX ADMIN — FOLIC ACID 0.5 MG: 1 TABLET ORAL at 12:39

## 2024-12-11 RX ADMIN — Medication 1000 MCG: at 12:41

## 2024-12-11 RX ADMIN — LEVETIRACETAM 1500 MG: 500 TABLET, FILM COATED ORAL at 21:16

## 2024-12-11 ASSESSMENT — PAIN SCALES - GENERAL
PAINLEVEL_OUTOF10: 7
PAINLEVEL_OUTOF10: 8
PAINLEVEL_OUTOF10: 7

## 2024-12-11 ASSESSMENT — PAIN DESCRIPTION - LOCATION
LOCATION: HIP

## 2024-12-11 ASSESSMENT — PAIN - FUNCTIONAL ASSESSMENT
PAIN_FUNCTIONAL_ASSESSMENT: ACTIVITIES ARE NOT PREVENTED
PAIN_FUNCTIONAL_ASSESSMENT: PREVENTS OR INTERFERES SOME ACTIVE ACTIVITIES AND ADLS
PAIN_FUNCTIONAL_ASSESSMENT: PREVENTS OR INTERFERES SOME ACTIVE ACTIVITIES AND ADLS

## 2024-12-11 ASSESSMENT — PAIN DESCRIPTION - DESCRIPTORS
DESCRIPTORS: ACHING;TENDER
DESCRIPTORS: SHARP
DESCRIPTORS: ACHING;TENDER

## 2024-12-11 ASSESSMENT — PAIN DESCRIPTION - ORIENTATION
ORIENTATION: LEFT

## 2024-12-11 ASSESSMENT — PAIN DESCRIPTION - PAIN TYPE: TYPE: SURGICAL PAIN

## 2024-12-11 NOTE — CONSULTS
[facility] ACUTE CARE PHYSICAL THERAPY EVALUATION  Brent Degroot, 1960, 1118/1118-A, 12/11/2024    History  [place]:    Patient  has a past medical history of Cerebral artery occlusion with cerebral infarction (HCC), Hypertension, and Seizures (HCC).  Patient  has no past surgical history on file.    Discharge Recommendation: Facility for intensive rehabilitation, anticipate 3 hours per day and 5 days per week.     Equipment: TBD at next level of care    Subjective:    Patient states:  \"I did more than I thought I would.\"      Pain:  8/10 pain at rest in L hip at sx site, states pain improved with sitting.      Communication with other providers:  Handoff to RN, OT    Restrictions: WBAT L LE, general precautions, fall risk    Home Setup/Prior level of function  Social/Functional History  Lives With: Other (comment) (Brother)  Type of Home: House  Home Layout: One level (1 step into/out of rest of house from bedroom)  Home Access: Stairs to enter with rails  Entrance Stairs - Number of Steps: 3  Entrance Stairs - Rails: Both  Bathroom Shower/Tub: Tub/Shower unit  Bathroom Equipment: Grab bars in shower, Grab bars around toilet  Home Equipment: Cane, Walker - Standard  Prior Level of Assist for ADLs: Independent  Prior Level of Assist for Homemaking: Independent  Homemaking Responsibilities: Yes  Prior Level of Assist for Ambulation: Independent household ambulator, with or without device, Independent community ambulator, with or without device (does not use AD at [facility])  Prior Level of Assist for Transfers: Independent    Examination of body systems (includes body structures/functions, activity/participation limitations):  Observation:  pt supine in bed with family present upon arrival and agreeable to therapy  Vision:  WFL  Hearing:  WFL  Cardiopulmonary:  no O2 needs,  prior to mobility and 115 at end of session  Cognition: A&Ox3, see OT/SLP note for further

## 2024-12-11 NOTE — CARE COORDINATION
12/11/24 1442   Service Assessment   Patient Orientation Alert and Oriented   Cognition Alert   History Provided By Child/Family   Primary Caregiver Family   Support Systems Family Members   Patient's Healthcare Decision Maker is: Legal Next of Kin   PCP Verified by CM Yes   Last Visit to PCP Within last 6 months   Prior Functional Level Assistance with the following:;Cooking;Housework;Shopping   Current Functional Level Assistance with the following:;Mobility;Bathing;Toileting   Can patient return to prior living arrangement No   Ability to make needs known: Fair   Family able to assist with home care needs: Yes   Would you like for me to discuss the discharge plan with any other family members/significant others, and if so, who? Yes   Financial Resources Medicare   Community Resources None     Patient is form home with brother. Has PCP and insurance. Has needed DME. Patient and family would like ARU. Referral made to ARU. CM following for determination.

## 2024-12-11 NOTE — PROGRESS NOTES
[facility]    Orthopedic Surgery   ?     Progress Note     Name:  Brent Degroot  Date/Time of Admission: 2024  6:27 PM   CSN: [id number]  Attending Provider: Jhon Jasso MD   Room/Bed:  1118/1118-A  : 1960  64 y.o.      Today's Date 2024   Hospital Day: 3    Assessment    64 y.o. male 1 Day Post-Op S/P Procedure(s):  HIP INTRAMEDULLARY NAIL YI INSERTION    Principal Problem:    Closed left hip fracture, initial encounter ([facility])  Resolved Problems:    * No resolved hospital problems. *      Plan   WB status: WBAT LLE  Antibiotics: Ancef x24 hrs, in process  DVT Prophylaxis: SCDs, Lovenox, pt will require at least 4 weeks of 81mg ASA BID from orthopedic perspective   Hb pending today  Pain control  PT/OT  Patient is okay for discharge from an orthopedic standpoint as long as safe discharge disposition in place and patient meeting PT goals for discharge  Prescription for DVT prophylaxis and pain medication in chart  Appropriate follow-up instructions and discharge instructions in electronic chart  Please call with questions.     Subjective   No acute events overnight. No concerns per nursing. Orders were not released on floor post-op so labs and antibiotics were delayed. Tolerating PO intake. No new complaints. Pain controlled. Denies numbness or paresthesias. Able to work with PT to dangle legs today. All questions answered.     Objective     Vitals:    24 0622 24 0623 24 1235 24 1500   BP: 138/86  133/84 125/84   Pulse:       Resp:  18 18 16   Temp:   98.9 °F (37.2 °C) 98.2 °F (36.8 °C)   TempSrc:   Oral Oral   SpO2:   94% 95%   Weight:       Height:         Temp (48hrs), Av.2 °F (36.8 °C), Min:97.7 °F (36.5 °C), Max:98.9 °F (37.2 °C)      EXAM:  Gen: NAD, lying in bed   LLE: Dressings in place, scant bloody strike through without saturation. +DF/PF/EHL/FHL. SILT Sa/SP/DP/T/Irizarry. Tips of toes symmetrically warm to contralateral side, brisk

## 2024-12-11 NOTE — PROGRESS NOTES
V2.0    [facility] [place] Progress Note      Name:  Brent Degroot /Age/Sex: 1960  (64 y.o. male)   MRN & CSN:  [id number] & [id number] Encounter Date/Time: 2024 11:51 AM EST   Location:  [facility]-A PCP: Damon Esquivel MD     Attending:Jhon Jasso MD       Hospital Day: 3    Assessment and Recommendations   Brent Degroot is a 64 y.o. male who presents with Closed left hip fracture, initial encounter ([facility])      Plan:     # Closed left hip fracture  - Reported missing last step going down stairs leading to fall on left hip. No head trauma or LOC. Not on anticoagulants, but on Plavix.   - NVI distally. XR showed intertrochanteric fracture of left femur without dislocation.  - Ortho consulted.  Underwent left hip intramedullary nail procedure with the OR.  -On Lovenox for DVT prophylaxis for now.  Will need at least 4 weeks from the date of procedure.     # Elevated blood pressure  - Not on antihypertensive meds at home.   - Ensure adequate pain control first, consider starting antihypertensive med if no improvement.       # Hx of CVA with left-sided residual deficits  -Plavix was held for surgery but now restarted.  Continue Liptior.      # Seizure disorder  - Continue Depakote, Vimpat and Keppra.       # Advanced dementia without behavioral disturbances  - A&Ox3 in ED, attentive.  - Continue Aricept and Naemenda with delirium precautions, avoid benzodiazepines and anticholinergic medications.     # Depression  - Continue Zoloft.      Diet ADULT DIET; Regular  ADULT ORAL NUTRITION SUPPLEMENT; Lunch; Standard High Calorie/High Protein Oral Supplement   DVT Prophylaxis [] Lovenox, []  Heparin, [x] SCDs, [] Ambulation,  [] Eliquis, [] Xarelto  [] Coumadin   Code Status Full Code   Disposition From: home  Expected Disposition: hhc vs snf  Estimated Date of Discharge: 2- days  Patient requires continued admission due medically stable for discharge pending placement   Surrogate Decision Maker/ POA   RARE 07/24/2018 09:24 PM    TRICHOMONAS NONE SEEN 10/17/2018 09:05 AM    BACTERIA NEGATIVE 10/17/2018 09:05 AM    CLARITYU CLEAR 10/17/2018 09:05 AM    LEUKOCYTESUR NEGATIVE 10/17/2018 09:05 AM    UROBILINOGEN NORMAL 10/17/2018 09:05 AM    BILIRUBINUR NEGATIVE 10/17/2018 09:05 AM    BLOODU NEGATIVE 10/17/2018 09:05 AM    GLUCOSEU NEGATIVE 10/17/2018 09:05 AM    KETUA NEGATIVE 10/17/2018 09:05 AM       Electronically signed by Jhon Jasso MD on 12/11/2024 at 11:24 AM

## 2024-12-11 NOTE — CARE COORDINATION
Received referral for ARU.  Reviewed patients clinicals and PT/OT notes.     ARU will follow for updated therapy notes once HR is more stable.  Patient wasn't able to do transfers d/t elevated HR.     Notified CM that ARU will follow.

## 2024-12-11 NOTE — CONSULTS
[facility] ACUTE CARE OCCUPATIONAL THERAPY EVALUATION  Brent Degroot, 1960, 1118/1118-A, 12/11/2024    Discharge Recommendation: Encourage facility for intensive rehabilitation, anticipate 3 hours per day and 5 days per week.    History  [place]:  L hip Fx  Patient  has a past medical history of Cerebral artery occlusion with cerebral infarction (HCC), Hypertension, and Seizures (HCC).  Patient  has no past surgical history on file.    Subjective:  Patient states:  \"I feel better than I thought I would sitting up\"   Pain:  8/10 pain upon arrival in L hip.    Communication: RN, CM, co-eval with PT Yesenia for safety and activity tolerance  Restrictions: general precautions, fall risk and WBAT L LE    Home Setup/Prior level of function  Social/Functional History  Lives With: Other (comment) (Brother)  Type of Home: House  Home Layout: One level (1 step into/out of rest of house from bedroom)  Home Access: Stairs to enter with rails  Entrance Stairs - Number of Steps: 3  Entrance Stairs - Rails: Both  Bathroom Shower/Tub: Tub/Shower unit  Bathroom Equipment: Grab bars in shower, Grab bars around toilet  Home Equipment: Cane, Walker - Standard  Prior Level of Assist for ADLs: Independent  Prior Level of Assist for Homemaking: Independent  Homemaking Responsibilities: Yes  Prior Level of Assist for Ambulation: Independent household ambulator, with or without device, Independent community ambulator, with or without device (does not use AD at [facility])  Prior Level of Assist for Transfers: Independent    Examination of body systems (includes body structures/functions, activity/participation limitations):  Observation:  Supine in bed upon arrival, agreeable to therapy   Vision:  L visual field cut from past CVA  Hearing:  WFL  Cardiopulmonary:  On room air, tele,  upon arrival, HR increased to 135 with pt attempted to move L LE in bed, -163 when sitting EOB,  when returned to bed, (multiple  training, Functional mobility training, Gait training, Endurance training, Neuromuscular re-education, Pain management, Cognitive reorientation, Safety education & training, Patient/Caregiver education & training, Equipment evaluation, education, & procurement, Positioning       Goals:  - Pt will perform UE ADLs with Mod I  using AE PRN by d/c  - Pt will perform LE ADLs with Min A  using AE PRN by d/c  - Pt will perform toileting with Min A using AE PRN by d/c  - Pt will perform HH distance functional mobility with CGA  using DME PRN in preparation for return to home   - Pt will perform functional transfers to/from bed, chair, and toilet with CGA using AE PRN by d/c  - Pt will perform therex/theract in order to increase functional activity tolerance    Treatment plan:  Pt will perform therex/theract in order to increase functional activity tolerance in preparation for ADL participation.     Recommendations for NURSING activity: Up to chair for all 3 meals and up to BSC as HR allows for all toileting needs    Time:   Time in: 0909  Time out: 0940  Timed treatment minutes: 15  Total time: 31    Electronically signed by:    ANABELLE Thomas/L [id number]  12/11/2024, 12:58 PM

## 2024-12-12 LAB
ANION GAP SERPL CALCULATED.3IONS-SCNC: 6 MMOL/L (ref 9–17)
BASOPHILS # BLD: 0.03 K/UL
BASOPHILS NFR BLD: 0 % (ref 0–1)
BUN SERPL-MCNC: 16 MG/DL (ref 7–20)
CALCIUM SERPL-MCNC: 8.2 MG/DL (ref 8.3–10.6)
CHLORIDE SERPL-SCNC: 106 MMOL/L (ref 99–110)
CO2 SERPL-SCNC: 30 MMOL/L (ref 21–32)
CREAT SERPL-MCNC: 0.8 MG/DL (ref 0.8–1.3)
EOSINOPHIL # BLD: 0.05 K/UL
EOSINOPHILS RELATIVE PERCENT: 1 % (ref 0–3)
ERYTHROCYTE [DISTWIDTH] IN BLOOD BY AUTOMATED COUNT: 13.7 % (ref 11.7–14.9)
GFR, ESTIMATED: >90 ML/MIN/1.73M2
GLUCOSE SERPL-MCNC: 117 MG/DL (ref 74–99)
HCT VFR BLD AUTO: 19.2 % (ref 42–52)
HCT VFR BLD AUTO: 23.1 % (ref 42–52)
HGB BLD-MCNC: 6.1 G/DL (ref 13.5–18)
HGB BLD-MCNC: 7.6 G/DL (ref 13.5–18)
IMM GRANULOCYTES # BLD AUTO: 0.03 K/UL
IMM GRANULOCYTES NFR BLD: 0 %
LYMPHOCYTES NFR BLD: 1.65 K/UL
LYMPHOCYTES RELATIVE PERCENT: 18 % (ref 24–44)
MCH RBC QN AUTO: 32.6 PG (ref 27–31)
MCHC RBC AUTO-ENTMCNC: 31.8 G/DL (ref 32–36)
MCV RBC AUTO: 102.7 FL (ref 78–100)
MONOCYTES NFR BLD: 1.21 K/UL
MONOCYTES NFR BLD: 13 % (ref 0–4)
NEUTROPHILS NFR BLD: 67 % (ref 36–66)
NEUTS SEG NFR BLD: 6.09 K/UL
PLATELET # BLD AUTO: 187 K/UL (ref 140–440)
PMV BLD AUTO: 10 FL (ref 7.5–11.1)
POTASSIUM SERPL-SCNC: 3.8 MMOL/L (ref 3.5–5.1)
RBC # BLD AUTO: 1.87 M/UL (ref 4.6–6.2)
SODIUM SERPL-SCNC: 141 MMOL/L (ref 136–145)
WBC OTHER # BLD: 9.1 K/UL (ref 4–10.5)

## 2024-12-12 PROCEDURE — 6370000000 HC RX 637 (ALT 250 FOR IP): Performed by: STUDENT IN AN ORGANIZED HEALTH CARE EDUCATION/TRAINING PROGRAM

## 2024-12-12 PROCEDURE — 94761 N-INVAS EAR/PLS OXIMETRY MLT: CPT

## 2024-12-12 PROCEDURE — 1200000000 HC SEMI PRIVATE

## 2024-12-12 PROCEDURE — 86901 BLOOD TYPING SEROLOGIC RH(D): CPT

## 2024-12-12 PROCEDURE — 86850 RBC ANTIBODY SCREEN: CPT

## 2024-12-12 PROCEDURE — 85025 COMPLETE CBC W/AUTO DIFF WBC: CPT

## 2024-12-12 PROCEDURE — 94150 VITAL CAPACITY TEST: CPT

## 2024-12-12 PROCEDURE — 86923 COMPATIBILITY TEST ELECTRIC: CPT

## 2024-12-12 PROCEDURE — 36415 COLL VENOUS BLD VENIPUNCTURE: CPT

## 2024-12-12 PROCEDURE — 97530 THERAPEUTIC ACTIVITIES: CPT

## 2024-12-12 PROCEDURE — 2580000003 HC RX 258: Performed by: STUDENT IN AN ORGANIZED HEALTH CARE EDUCATION/TRAINING PROGRAM

## 2024-12-12 PROCEDURE — 6360000002 HC RX W HCPCS: Performed by: STUDENT IN AN ORGANIZED HEALTH CARE EDUCATION/TRAINING PROGRAM

## 2024-12-12 PROCEDURE — 85018 HEMOGLOBIN: CPT

## 2024-12-12 PROCEDURE — 85014 HEMATOCRIT: CPT

## 2024-12-12 PROCEDURE — 80048 BASIC METABOLIC PNL TOTAL CA: CPT

## 2024-12-12 PROCEDURE — 86900 BLOOD TYPING SEROLOGIC ABO: CPT

## 2024-12-12 PROCEDURE — 97535 SELF CARE MNGMENT TRAINING: CPT

## 2024-12-12 PROCEDURE — 36430 TRANSFUSION BLD/BLD COMPNT: CPT

## 2024-12-12 PROCEDURE — P9016 RBC LEUKOCYTES REDUCED: HCPCS

## 2024-12-12 RX ORDER — SODIUM CHLORIDE 9 MG/ML
INJECTION, SOLUTION INTRAVENOUS PRN
Status: DISCONTINUED | OUTPATIENT
Start: 2024-12-12 | End: 2024-12-13

## 2024-12-12 RX ADMIN — ATORVASTATIN CALCIUM 40 MG: 40 TABLET, FILM COATED ORAL at 21:09

## 2024-12-12 RX ADMIN — CLOPIDOGREL BISULFATE 75 MG: 75 TABLET ORAL at 08:22

## 2024-12-12 RX ADMIN — SODIUM CHLORIDE, PRESERVATIVE FREE 10 ML: 5 INJECTION INTRAVENOUS at 21:10

## 2024-12-12 RX ADMIN — SODIUM CHLORIDE, PRESERVATIVE FREE 10 ML: 5 INJECTION INTRAVENOUS at 08:10

## 2024-12-12 RX ADMIN — ACETAMINOPHEN 650 MG: 325 TABLET ORAL at 17:59

## 2024-12-12 RX ADMIN — SENNOSIDES AND DOCUSATE SODIUM 1 TABLET: 50; 8.6 TABLET ORAL at 21:08

## 2024-12-12 RX ADMIN — LACOSAMIDE 200 MG: 100 TABLET, FILM COATED ORAL at 08:43

## 2024-12-12 RX ADMIN — LACOSAMIDE 200 MG: 100 TABLET, FILM COATED ORAL at 21:08

## 2024-12-12 RX ADMIN — Medication 1000 MCG: at 21:09

## 2024-12-12 RX ADMIN — OXYCODONE HYDROCHLORIDE 10 MG: 10 TABLET ORAL at 21:09

## 2024-12-12 RX ADMIN — LEVETIRACETAM 1500 MG: 500 TABLET, FILM COATED ORAL at 21:09

## 2024-12-12 RX ADMIN — MEMANTINE 10 MG: 10 TABLET ORAL at 08:23

## 2024-12-12 RX ADMIN — DIVALPROEX SODIUM 500 MG: 500 TABLET, DELAYED RELEASE ORAL at 08:24

## 2024-12-12 RX ADMIN — ACETAMINOPHEN 650 MG: 325 TABLET ORAL at 06:29

## 2024-12-12 RX ADMIN — LEVETIRACETAM 1500 MG: 500 TABLET, FILM COATED ORAL at 08:25

## 2024-12-12 RX ADMIN — Medication 100 MG: at 08:23

## 2024-12-12 RX ADMIN — ACETAMINOPHEN 650 MG: 325 TABLET ORAL at 01:05

## 2024-12-12 RX ADMIN — OXYCODONE HYDROCHLORIDE 10 MG: 10 TABLET ORAL at 01:05

## 2024-12-12 RX ADMIN — OXYCODONE HYDROCHLORIDE 10 MG: 10 TABLET ORAL at 06:29

## 2024-12-12 RX ADMIN — Medication 1000 MCG: at 08:23

## 2024-12-12 RX ADMIN — MEMANTINE 10 MG: 10 TABLET ORAL at 21:09

## 2024-12-12 RX ADMIN — SERTRALINE HYDROCHLORIDE 200 MG: 50 TABLET ORAL at 08:22

## 2024-12-12 RX ADMIN — SENNOSIDES AND DOCUSATE SODIUM 1 TABLET: 50; 8.6 TABLET ORAL at 08:23

## 2024-12-12 RX ADMIN — DONEPEZIL HYDROCHLORIDE 10 MG: 10 TABLET ORAL at 21:09

## 2024-12-12 RX ADMIN — FOLIC ACID 0.5 MG: 1 TABLET ORAL at 08:23

## 2024-12-12 RX ADMIN — DIVALPROEX SODIUM 500 MG: 500 TABLET, DELAYED RELEASE ORAL at 17:59

## 2024-12-12 RX ADMIN — MELATONIN TAB 3 MG 6 MG: 3 TAB at 21:09

## 2024-12-12 RX ADMIN — ENOXAPARIN SODIUM 30 MG: 100 INJECTION SUBCUTANEOUS at 17:59

## 2024-12-12 ASSESSMENT — PAIN DESCRIPTION - DESCRIPTORS
DESCRIPTORS: ACHING;BURNING
DESCRIPTORS: ACHING
DESCRIPTORS: ACHING;BURNING

## 2024-12-12 ASSESSMENT — PAIN DESCRIPTION - ORIENTATION
ORIENTATION: LEFT

## 2024-12-12 ASSESSMENT — PAIN DESCRIPTION - PAIN TYPE: TYPE: SURGICAL PAIN

## 2024-12-12 ASSESSMENT — PAIN - FUNCTIONAL ASSESSMENT
PAIN_FUNCTIONAL_ASSESSMENT: PREVENTS OR INTERFERES SOME ACTIVE ACTIVITIES AND ADLS
PAIN_FUNCTIONAL_ASSESSMENT: ACTIVITIES ARE NOT PREVENTED
PAIN_FUNCTIONAL_ASSESSMENT: PREVENTS OR INTERFERES SOME ACTIVE ACTIVITIES AND ADLS

## 2024-12-12 ASSESSMENT — PAIN DESCRIPTION - LOCATION
LOCATION: HIP

## 2024-12-12 ASSESSMENT — PAIN SCALES - GENERAL
PAINLEVEL_OUTOF10: 8

## 2024-12-12 NOTE — CONSENT
Informed Consent for Blood Component Transfusion Note    I have discussed with the patient the rationale for blood component transfusion; its benefits in treating or preventing fatigue, organ damage, or death; and its risk which includes mild transfusion reactions, rare risk of blood borne infection, or more serious but rare reactions. I have discussed the alternatives to transfusion, including the risk and consequences of not receiving transfusion. The patient had an opportunity to ask questions and had agreed to proceed with transfusion of blood components.    Electronically signed by MG Owusu CNP on 12/12/24 at 4:55 AM EST

## 2024-12-12 NOTE — PROGRESS NOTES
V2.0    [facility] [place] Progress Note      Name:  Brent Degroot /Age/Sex: 1960  (64 y.o. male)   MRN & CSN:  [id number] & [id number] Encounter Date/Time: 2024 7:38 AM EST   Location:  [facility]8/1118-A PCP: Damon Esquivel MD     Attending:Sudheer Mcfarlane*       Hospital Day: 4    Assessment and Recommendations   Brent Degroot is a 64 y.o. male with pmh of PMHx of CVA with left-sided residual deficits, seizure disorder, advanced dementia without behavioral disturbances and depression who presents with Closed left hip fracture, initial encounter (HCC)      Plan:   Acute anemia, postop and dilutional  Patient with progressive drop in his hemoglobin from 11.9 on admission to 6.1 this a.m. His other cell lines have also dropped  --He is currently getting a transfusion of 1 unit, will reassess.  Site is not showing any hematomas and thigh is not distended or taut.  --Trend H&H and transfuse for hemoglobin less than 7.  If it continues to trend down, will scan and obtain iron panel    Closed left hip fracture  Reported missing last step going down stairs leading to fall on left hip. No head trauma or LOC. On Plavix.   --NVI distally. XR showed intertrochanteric fracture of left femur without dislocation.  --ED provider discussed with Ortho, and patient is POD 2, appreciate Ortho recs.  Continue DVT prophylaxis with Lovenox, recommendations from Ortho is 4 weeks of 81 mg of ASA twice daily.     Elevated blood pressure -resolved  Not on antihypertensive meds at home.   --Blood pressure is normotensive at this time, continue to monitor     Hx of CVA with left-sided residual deficits  --Continue Plavix, and statin     Seizure disorder  --Continue Depakote, Vimpat and Keppra.       Advanced dementia without behavioral disturbances  A&Ox3 in ED, attentive.  --Continue Aricept and Naemenda with delirium precautions, avoid benzodiazepines and anticholinergic medications.     Depression  --Continue  breath. No pain to bilateral arms of the right leg. Patient is with isolated severe left hip pain is worse with any movement. EMS was called and did place a pelvic binder on patient with the use of his sheet and patient was brought the emergency department. Patient did receive 100 mcg of fentanyl prehospital. Patient has prior injury to the left hip. COMPARISON: Chest radiograph from 3/6/2019 TECHNIQUE: AP/PA view of the chest was obtained. FINDINGS: Subtle interstitial opacities and increased bronchovascular markings which is a nonspecific finding. Lung inflation has improved compared to the prior. Subtle prominence of the pulmonary vascular hilar shadows. No focal consolidative opacities are identified. No large pleural effusion. No pneumothorax. The cardiomediastinal silhouette is stable in appearance. The thoracic aorta is tortuous and atherosclerotic. Mild degenerative changes of the thoracic spine and the shoulder joints. The bony thorax is otherwise grossly intact.      1.  No acute radiographic findings in the chest. Electronically signed by Janice Fisher    XR HIP 2-3 VW W PELVIS LEFT    Result Date: 12/9/2024  Left Hip INDICATION: Fell, Pain COMPARISON:  None TECHNIQUE: Three views of the left hip were obtained FINDINGS: Intertrochanteric fracture of the left femur. No dislocation. Right hip is grossly unremarkable. The pelvic ring is intact.. Suspected old fracture of the bilateral inferior pubic rami.     Intertrochanteric fracture of the left femur. No dislocation. Electronically signed by Miranda Frazier      CBC:   Recent Labs     12/09/24  1849 12/11/24  1457 12/12/24  0338   WBC 9.4 11.3* 9.1   HGB 11.9* 7.4* 6.1*    197 187     BMP:    Recent Labs     12/09/24  2018 12/11/24  1457 12/12/24  0338    139 141   K 3.8 3.8 3.8    102 106   CO2 26 28 30   BUN 18 18 16   CREATININE 0.7* 0.9 0.8   GLUCOSE 126* 137* 117*     Hepatic: No results for input(s): \"AST\", \"ALT\", \"BILITOT\",

## 2024-12-12 NOTE — PROGRESS NOTES
Occupational Therapy    Occupational Therapy Treatment Note    Name: Brent Degroot MRN: [id number] :   1960   Date:  2024   Admission Date: 2024 Room:  [address] [place] [address]     Restrictions/Precautions:  WBAT L LE, general precautions, fall risk     Communication with other providers: RN approved session, co tx with PTA    Subjective:  Patient states:  Pt agreeable to therapy session.   Pain:   noted pain did not rate.     Objective:    Observation: Pt supine in bed upon arrival.  Objective Measures:  Pt alert and oriented.     Treatment, including education:  Self Care Training:   Cues were given for safety, sequence, UE/LE placement, visual cues, and balance.    Activities performed today included UB/LB dressing tasks, toileting, hand hygiene at sink    Pt supine in bed upon arrival and completed up to sit with MOD A. Pt completed scooting to edge of bed with CGA and completed STS from edge of bed with WW with MOD A and completed SPT from bed to BSC with MOD A. Pt completed doffing undergarment with MAX A and unable to have BM. Pt completed sit to stand with MOD A and seated in chair with all needs met and call light in reach chair alarm on.     Assessment / Impression:    Patient's tolerance of treatment: Well  Adverse Reaction: None  Significant change in status and impact: Improved from initial evaluation  Barriers to improvement: None noted      Plan for Next Session:    Continue OT POC    Time in:  1357  Time out:  1424  Timed treatment minutes:  27  Total treatment time:  27      Electronically signed by:      GRETCHEN Fonseca

## 2024-12-12 NOTE — PROGRESS NOTES
Physical Therapy Treatment Note  Name: Brent Degroot MRN: [id number] :   1960   Date:  2024   Admission Date: 2024 Room:  [address] [place] [address]   Restrictions/Precautions: WBAT L LE, general precautions, fall risk   Communication with other providers:  Pt okay to see for therapy per RN, CoTx with MECCA Peace for pt tolerance and safety.   Subjective:  Patient states:  \"My jemima used to work for the company that [employer] [facility] [employer]\"   Pain:   Location, Type, Intensity (0/10 to 10/10):  Nonverbal indications of pn.   Objective:    Observation:  Pt supine, in bed upon PTA arrival.   Objective Measures:  Tele, stable  Treatment, including education/measures:    Therapeutic Activity Training:   Therapeutic activity training was instructed today.  Cues were given for safety, sequence, UE/LE placement, awareness, and balance. Activities performed today included bed mobility training, sup-sit, sit-stand, SPT.    Mobility:  Sup > sit: Mod A to EOB.   Scooting: CGA with significant increase In time and effort. Pt performed lateral scooting to R at EOB.   STS: Mod A from EOB, Mod A from BSC.   SPT: CGA-Min A from EOB > BSC at gait belt, Mod A at walker for navigation.     Safety:   Pt returned safely to recliner with chair alarm activated, call light in reach, all needs met.   Assessment / Impression:    Pt tolerated OOB activities well this date but does present with substantial pain throughout session.   Patient's tolerance of treatment:  good   Adverse Reaction: none  Significant change in status and impact:  none  Barriers to improvement:  none  Plan for Next Session:    Plan to continue OOB Activities.   Time in:  1357  Time out:  1424  Timed treatment minutes:  27  Total treatment time:  27    Previously filed items:  Social/Functional History  Lives With: Other (comment) (Brother)  Type of Home: House  Home Layout: One level (1 step into/out of rest of house from bedroom)  Home Access: Stairs to enter  with rails  Entrance Stairs - Number of Steps: 3  Entrance Stairs - Rails: Both  Bathroom Shower/Tub: Tub/Shower unit  Bathroom Equipment: Grab bars in shower, Grab bars around toilet  Home Equipment: Cane, Walker - Standard  Prior Level of Assist for ADLs: Independent  Prior Level of Assist for Homemaking: Independent  Homemaking Responsibilities: Yes  Prior Level of Assist for Ambulation: Independent household ambulator, with or without device, Independent community ambulator, with or without device (does not use AD at [facility])  Prior Level of Assist for Transfers: Independent        Short Term Goals  Time Frame for Short Term Goals: 1 week  Short Term Goal 1: pt to complete all bed mobility SBA  Short Term Goal 2: pt to complete all STS transfers to/from bed, commode, and chair min A  Short Term Goal 3: pt to ambulate 25' with LRAD CGA    Electronically signed by:    Sonia Mckeon, PTA  12/12/2024, 2:09 PM

## 2024-12-12 NOTE — PROGRESS NOTES
[facility]    Orthopedic Surgery   ?     Progress Note     Name:  Brent Dgeroot  Date/Time of Admission: 2024  6:27 PM   CSN: [id number]  Attending Provider: Sudheer Mcfarlane*   Room/Bed:  [facility]8/1118-A  : 1960  64 y.o.      Today's Date 2024   Hospital Day: 4    Assessment    64 y.o. male 2 Days Post-Op S/P Procedure(s):  HIP INTRAMEDULLARY NAIL YI INSERTION    Principal Problem:    Closed left hip fracture, initial encounter ([facility])  Resolved Problems:    * No resolved hospital problems. *      Plan   WB status: WBAT LLE  Antibiotics: Ancef complete  DVT Prophylaxis: SCDs, Lovenox, pt will require at least 4 weeks of 81mg ASA BID from orthopedic perspective   Hb 6.1 today, transfusion running; no not feel blood loss is from surgical sites or actively ongoing; suspect dilutional  Pain control  PT/OT  Dispo pending stable hemaglobin, pt otherwise stable for discharge from orthopedic perspective  Prescription for DVT prophylaxis and pain medication in chart  Appropriate follow-up instructions and discharge instructions in electronic chart  Please call with questions.     Subjective   No acute events overnight. No concerns per nursing. Tolerating PO intake. No new complaints. Pain controlled. Denies numbness or paresthesias. All questions answered.     Objective     Vitals:    24 0756 24 0805 24 0809 24 0830   BP: 111/69 111/69 111/69    Pulse:  99  (!) 103   Resp: 18 18 18 18   Temp: 98.8 °F (37.1 °C) 98.8 °F (37.1 °C) 98.8 °F (37.1 °C) 98 °F (36.7 °C)   TempSrc: Oral Oral Oral Oral   SpO2: 92% 92% 92% 93%   Weight:       Height:         Temp (48hrs), Av.4 °F (36.9 °C), Min:97.7 °F (36.5 °C), Max:98.9 °F (37.2 °C)      EXAM:  Gen: NAD, lying in bed   LLE: Dressings in place, minimal strike through consistent with exam yesterday; no evidence of hematoma. +DF/PF/EHL/FHL. SILT Sa/SP/DP/T/Irizarry. Tips of toes symmetrically warm to contralateral side,

## 2024-12-12 NOTE — CARE COORDINATION
Chart reviewed. ARU following for updated therapy notes. HR went up with transfers. CM following for determination.

## 2024-12-13 LAB
ABO/RH: NORMAL
ANION GAP SERPL CALCULATED.3IONS-SCNC: 8 MMOL/L (ref 9–17)
ANTIBODY SCREEN: NEGATIVE
BLOOD BANK BLOOD PRODUCT EXPIRATION DATE: NORMAL
BLOOD BANK DISPENSE STATUS: NORMAL
BLOOD BANK ISBT PRODUCT BLOOD TYPE: 5100
BLOOD BANK PRODUCT CODE: NORMAL
BLOOD BANK SAMPLE EXPIRATION: NORMAL
BLOOD BANK UNIT TYPE AND RH: NORMAL
BPU ID: NORMAL
BUN SERPL-MCNC: 11 MG/DL (ref 7–20)
CALCIUM SERPL-MCNC: 8.6 MG/DL (ref 8.3–10.6)
CHLORIDE SERPL-SCNC: 103 MMOL/L (ref 99–110)
CO2 SERPL-SCNC: 30 MMOL/L (ref 21–32)
COMPONENT: NORMAL
CREAT SERPL-MCNC: 0.6 MG/DL (ref 0.8–1.3)
CROSSMATCH RESULT: NORMAL
ERYTHROCYTE [DISTWIDTH] IN BLOOD BY AUTOMATED COUNT: 14.9 % (ref 11.7–14.9)
ERYTHROCYTE [DISTWIDTH] IN BLOOD BY AUTOMATED COUNT: 14.9 % (ref 11.7–14.9)
GFR, ESTIMATED: >90 ML/MIN/1.73M2
GLUCOSE SERPL-MCNC: 105 MG/DL (ref 74–99)
HCT VFR BLD AUTO: 22.6 % (ref 42–52)
HCT VFR BLD AUTO: 22.6 % (ref 42–52)
HGB BLD-MCNC: 7.4 G/DL (ref 13.5–18)
HGB BLD-MCNC: 7.7 G/DL (ref 13.5–18)
MCH RBC QN AUTO: 32.3 PG (ref 27–31)
MCH RBC QN AUTO: 33.2 PG (ref 27–31)
MCHC RBC AUTO-ENTMCNC: 32.7 G/DL (ref 32–36)
MCHC RBC AUTO-ENTMCNC: 34.1 G/DL (ref 32–36)
MCV RBC AUTO: 97.4 FL (ref 78–100)
MCV RBC AUTO: 98.7 FL (ref 78–100)
PLATELET # BLD AUTO: 171 K/UL (ref 140–440)
PLATELET # BLD AUTO: 207 K/UL (ref 140–440)
PMV BLD AUTO: 9.7 FL (ref 7.5–11.1)
PMV BLD AUTO: 9.9 FL (ref 7.5–11.1)
POTASSIUM SERPL-SCNC: 3.5 MMOL/L (ref 3.5–5.1)
RBC # BLD AUTO: 2.29 M/UL (ref 4.6–6.2)
RBC # BLD AUTO: 2.32 M/UL (ref 4.6–6.2)
SODIUM SERPL-SCNC: 140 MMOL/L (ref 136–145)
TRANSFUSION STATUS: NORMAL
UNIT DIVISION: 0
UNIT ISSUE DATE/TIME: NORMAL
WBC OTHER # BLD: 7.2 K/UL (ref 4–10.5)
WBC OTHER # BLD: 8.3 K/UL (ref 4–10.5)

## 2024-12-13 PROCEDURE — 6370000000 HC RX 637 (ALT 250 FOR IP): Performed by: STUDENT IN AN ORGANIZED HEALTH CARE EDUCATION/TRAINING PROGRAM

## 2024-12-13 PROCEDURE — 2580000003 HC RX 258: Performed by: STUDENT IN AN ORGANIZED HEALTH CARE EDUCATION/TRAINING PROGRAM

## 2024-12-13 PROCEDURE — 97530 THERAPEUTIC ACTIVITIES: CPT

## 2024-12-13 PROCEDURE — 97116 GAIT TRAINING THERAPY: CPT

## 2024-12-13 PROCEDURE — 94761 N-INVAS EAR/PLS OXIMETRY MLT: CPT

## 2024-12-13 PROCEDURE — 94150 VITAL CAPACITY TEST: CPT

## 2024-12-13 PROCEDURE — 85027 COMPLETE CBC AUTOMATED: CPT

## 2024-12-13 PROCEDURE — 36415 COLL VENOUS BLD VENIPUNCTURE: CPT

## 2024-12-13 PROCEDURE — 97110 THERAPEUTIC EXERCISES: CPT

## 2024-12-13 PROCEDURE — 1200000000 HC SEMI PRIVATE

## 2024-12-13 PROCEDURE — 80048 BASIC METABOLIC PNL TOTAL CA: CPT

## 2024-12-13 PROCEDURE — 6360000002 HC RX W HCPCS: Performed by: STUDENT IN AN ORGANIZED HEALTH CARE EDUCATION/TRAINING PROGRAM

## 2024-12-13 RX ADMIN — OXYCODONE HYDROCHLORIDE 10 MG: 10 TABLET ORAL at 09:33

## 2024-12-13 RX ADMIN — ACETAMINOPHEN 650 MG: 325 TABLET ORAL at 06:02

## 2024-12-13 RX ADMIN — Medication 1000 MCG: at 09:33

## 2024-12-13 RX ADMIN — Medication 100 MG: at 09:50

## 2024-12-13 RX ADMIN — DONEPEZIL HYDROCHLORIDE 10 MG: 10 TABLET ORAL at 22:31

## 2024-12-13 RX ADMIN — LEVETIRACETAM 1500 MG: 500 TABLET, FILM COATED ORAL at 22:32

## 2024-12-13 RX ADMIN — SODIUM CHLORIDE, PRESERVATIVE FREE 10 ML: 5 INJECTION INTRAVENOUS at 22:36

## 2024-12-13 RX ADMIN — MEMANTINE 10 MG: 10 TABLET ORAL at 09:34

## 2024-12-13 RX ADMIN — OXYCODONE HYDROCHLORIDE 10 MG: 10 TABLET ORAL at 22:32

## 2024-12-13 RX ADMIN — ACETAMINOPHEN 650 MG: 325 TABLET ORAL at 23:46

## 2024-12-13 RX ADMIN — LACOSAMIDE 200 MG: 100 TABLET, FILM COATED ORAL at 09:33

## 2024-12-13 RX ADMIN — SENNOSIDES AND DOCUSATE SODIUM 1 TABLET: 50; 8.6 TABLET ORAL at 22:32

## 2024-12-13 RX ADMIN — MEMANTINE 10 MG: 10 TABLET ORAL at 22:32

## 2024-12-13 RX ADMIN — SODIUM CHLORIDE, PRESERVATIVE FREE 10 ML: 5 INJECTION INTRAVENOUS at 09:35

## 2024-12-13 RX ADMIN — DIVALPROEX SODIUM 500 MG: 500 TABLET, DELAYED RELEASE ORAL at 17:38

## 2024-12-13 RX ADMIN — LACOSAMIDE 200 MG: 100 TABLET, FILM COATED ORAL at 22:31

## 2024-12-13 RX ADMIN — MELATONIN TAB 3 MG 6 MG: 3 TAB at 22:31

## 2024-12-13 RX ADMIN — FOLIC ACID 0.5 MG: 1 TABLET ORAL at 09:33

## 2024-12-13 RX ADMIN — LEVETIRACETAM 1500 MG: 500 TABLET, FILM COATED ORAL at 09:34

## 2024-12-13 RX ADMIN — SERTRALINE HYDROCHLORIDE 200 MG: 50 TABLET ORAL at 09:33

## 2024-12-13 RX ADMIN — CLOPIDOGREL BISULFATE 75 MG: 75 TABLET ORAL at 09:34

## 2024-12-13 RX ADMIN — Medication 1000 MCG: at 22:32

## 2024-12-13 RX ADMIN — DIVALPROEX SODIUM 500 MG: 500 TABLET, DELAYED RELEASE ORAL at 09:50

## 2024-12-13 RX ADMIN — ATORVASTATIN CALCIUM 40 MG: 40 TABLET, FILM COATED ORAL at 22:32

## 2024-12-13 RX ADMIN — ENOXAPARIN SODIUM 30 MG: 100 INJECTION SUBCUTANEOUS at 17:38

## 2024-12-13 ASSESSMENT — PAIN DESCRIPTION - LOCATION
LOCATION: LEG;HIP
LOCATION: HIP;KNEE
LOCATION: HIP;KNEE

## 2024-12-13 ASSESSMENT — PAIN SCALES - GENERAL
PAINLEVEL_OUTOF10: 6
PAINLEVEL_OUTOF10: 8
PAINLEVEL_OUTOF10: 6
PAINLEVEL_OUTOF10: 10

## 2024-12-13 ASSESSMENT — PAIN DESCRIPTION - ORIENTATION
ORIENTATION: LEFT
ORIENTATION: RIGHT;LEFT
ORIENTATION: LEFT

## 2024-12-13 ASSESSMENT — PAIN - FUNCTIONAL ASSESSMENT
PAIN_FUNCTIONAL_ASSESSMENT: PREVENTS OR INTERFERES SOME ACTIVE ACTIVITIES AND ADLS
PAIN_FUNCTIONAL_ASSESSMENT: PREVENTS OR INTERFERES SOME ACTIVE ACTIVITIES AND ADLS
PAIN_FUNCTIONAL_ASSESSMENT: PREVENTS OR INTERFERES WITH MANY ACTIVE NOT PASSIVE ACTIVITIES

## 2024-12-13 ASSESSMENT — PAIN DESCRIPTION - FREQUENCY: FREQUENCY: CONTINUOUS

## 2024-12-13 ASSESSMENT — PAIN DESCRIPTION - DESCRIPTORS
DESCRIPTORS: SHOOTING
DESCRIPTORS: SHARP;SHOOTING
DESCRIPTORS: DULL;ACHING

## 2024-12-13 ASSESSMENT — PAIN DESCRIPTION - ONSET: ONSET: ON-GOING

## 2024-12-13 ASSESSMENT — PAIN DESCRIPTION - PAIN TYPE: TYPE: SURGICAL PAIN

## 2024-12-13 NOTE — CARE COORDINATION
Presented clinicals to Dr. Snyder.  Dr. Snyder approved patient for ARU.     Patients primary insurance is [employer], pre-cert required.  Pre-cert initiated and pending at this time.  Pending ref#  [id number].    Will follow for determination.

## 2024-12-13 NOTE — PLAN OF CARE
Problem: Chronic Conditions and Co-morbidities  Goal: Patient's chronic conditions and co-morbidity symptoms are monitored and maintained or improved  12/13/2024 0353 by Mirna Marie LPN  Outcome: Progressing  12/13/2024 0344 by Mirna Marie LPN  Outcome: Progressing     Problem: Discharge Planning  Goal: Discharge to home or other facility with appropriate resources  12/13/2024 0353 by Mirna Marie LPN  Outcome: Progressing  12/13/2024 0344 by Mirna Marie LPN  Outcome: Progressing     Problem: Pain  Goal: Verbalizes/displays adequate comfort level or baseline comfort level  12/13/2024 0353 by Mirna Mraie LPN  Outcome: Progressing  12/13/2024 0344 by Mirna Marie LPN  Outcome: Progressing     Problem: Safety - Adult  Goal: Free from fall injury  12/13/2024 0353 by Mirna Marie LPN  Outcome: Progressing  12/13/2024 0344 by Mirna Marie LPN  Outcome: Progressing     Problem: ABCDS Injury Assessment  Goal: Absence of physical injury  12/13/2024 0353 by Mirna Marie LPN  Outcome: Progressing  12/13/2024 0344 by Mirna Marie LPN  Outcome: Progressing     Problem: Skin/Tissue Integrity  Goal: Absence of new skin breakdown  Description: 1.  Monitor for areas of redness and/or skin breakdown  2.  Assess vascular access sites hourly  3.  Every 4-6 hours minimum:  Change oxygen saturation probe site  4.  Every 4-6 hours:  If on nasal continuous positive airway pressure, respiratory therapy assess nares and determine need for appliance change or resting period.  12/13/2024 0353 by Mirna Marie LPN  Outcome: Progressing  12/13/2024 0344 by Mirna Marie LPN  Outcome: Progressing

## 2024-12-13 NOTE — PROGRESS NOTES
V2.0    [facility] Progress [facility]      Name:  Brent Degroot /Age/Sex: 1960  (64 y.o. male)   MRN & CSN:  [id number] & [id number] Encounter Date/Time: 2024 7:38 AM EST   Location:  [facility] PCP: Damon Esquivel MD     Attending:Sudheer Mcfarlane*       Hospital Day: 5    Assessment and Recommendations   Brent Degroot is a 64 y.o. male with pmh of PMHx of CVA with left-sided residual deficits, seizure disorder, advanced dementia without behavioral disturbances and depression who presents with Closed left hip fracture, initial encounter ([facility])      Plan:   Acute anemia, postop and dilutional  Patient with progressive drop in his hemoglobin from 11.9 on admission to 6.1 .initial concern for dilution given a drop in all cell lines, however today his other cell lines are stable  --He is status post transfusion of 1 unit, hemoglobin 7.7 this a.m, slight drop to 7.4 on recheck 8 hours after.  Site is not showing any hematomas and thigh is not distended or taut.  --Trend H&H and transfuse for hemoglobin less than 7.  If it continues to trend down, will scan and obtain iron panel    Closed left hip fracture  Reported missing last step going down stairs leading to fall on left hip. No head trauma or LOC. On Plavix.   --NVI distally. XR showed intertrochanteric fracture of left femur without dislocation.  --ED provider discussed with Ortho, and patient is POD 2, appreciate Ortho recs.  Continue DVT prophylaxis with Lovenox, recommendations from Ortho is 4 weeks of 81 mg of ASA twice daily.  --PT OT recs appreciated and and ARU has accepted, pre-CERT is pending     Elevated blood pressure -resolved  Not on antihypertensive meds at home.   --Blood pressure is normotensive at this time, continue to monitor     Hx of CVA with left-sided residual deficits  --Continue statin, will hold Plavix at this time and monitor hemoglobin    Seizure disorder  --Continue Depakote, Vimpat and Keppra.

## 2024-12-13 NOTE — PROGRESS NOTES
Physical Therapy    Physical Therapy Treatment Note  Name: Brent Degroot MRN: [id number] :   1960   Date:  2024   Admission Date: 2024 Room:  [address] [place] [address]   Restrictions/Precautions:          WBAT L LE, general precautions, fall   Communication with other providers:  per nurse ok to tx and is aware of increased HR with activity. Nurse reports giving pain meds before tx session  Subjective:  Patient states:  pt agreeable to tx  Pain:   Location, Type, Intensity (0/10 to 10/10): pt rates pin 5 at rest and 8 with activity. Pt given ice ok at end of tx session  Objective:    Observation:  alert and oriented to self  Objective Measures:   at start of tx but slowly increased to 130 with amb. Once sitting and rest returned to 107.  Treatment, including education/measures:  Pt needing encouragement and education to not hold breathing with activity   Ex in sup with cues to do PLB   trunk stretches with deep breathing  10 reps aps  10 reps quad sets  10 reps glut sets  5 reps using gt belt to bend and lift leg  Laqs 5 reps in sitting   Sup to sit with leg  and min assist. Pt needing increased time and cues for PLB  Sit<=>stand from bed to chair min assist and cues.  Pt was able to amb 6 small steps with rw and min assist. Pt needing increased time and effort and cues to breath.  Turned chair to face bed rail and pt was able to use bed rail and transfer sit<=>stand with cga and cues. While standing at rail for support pt working on standing posture and deep breathing. Pt was able to work on wt shifts and progress to side stepping left and right with min assist and cues  Safety  Patient left safely in the chair, with call light/phone in reach with alarm applied. Gait belt was used for transfers and gait.   Assessment / Impression:      Patient's tolerance of treatment:  good   Adverse Reaction: na  Significant change in status and impact:  na  Barriers to improvement:  pain, strength, and

## 2024-12-14 LAB
HCT VFR BLD AUTO: 21.3 % (ref 42–52)
HCT VFR BLD AUTO: 26.4 % (ref 42–52)
HGB BLD-MCNC: 7.1 G/DL (ref 13.5–18)
HGB BLD-MCNC: 8.7 G/DL (ref 13.5–18)

## 2024-12-14 PROCEDURE — 2580000003 HC RX 258: Performed by: STUDENT IN AN ORGANIZED HEALTH CARE EDUCATION/TRAINING PROGRAM

## 2024-12-14 PROCEDURE — 97530 THERAPEUTIC ACTIVITIES: CPT

## 2024-12-14 PROCEDURE — 6360000002 HC RX W HCPCS: Performed by: STUDENT IN AN ORGANIZED HEALTH CARE EDUCATION/TRAINING PROGRAM

## 2024-12-14 PROCEDURE — 97116 GAIT TRAINING THERAPY: CPT

## 2024-12-14 PROCEDURE — 94150 VITAL CAPACITY TEST: CPT

## 2024-12-14 PROCEDURE — 85018 HEMOGLOBIN: CPT

## 2024-12-14 PROCEDURE — 6370000000 HC RX 637 (ALT 250 FOR IP): Performed by: STUDENT IN AN ORGANIZED HEALTH CARE EDUCATION/TRAINING PROGRAM

## 2024-12-14 PROCEDURE — 94761 N-INVAS EAR/PLS OXIMETRY MLT: CPT

## 2024-12-14 PROCEDURE — 97110 THERAPEUTIC EXERCISES: CPT

## 2024-12-14 PROCEDURE — 1200000000 HC SEMI PRIVATE

## 2024-12-14 PROCEDURE — 85014 HEMATOCRIT: CPT

## 2024-12-14 PROCEDURE — 36415 COLL VENOUS BLD VENIPUNCTURE: CPT

## 2024-12-14 RX ORDER — ACETAMINOPHEN 325 MG/1
650 TABLET ORAL EVERY 6 HOURS SCHEDULED
Status: DISCONTINUED | OUTPATIENT
Start: 2024-12-15 | End: 2024-12-19 | Stop reason: HOSPADM

## 2024-12-14 RX ADMIN — ACETAMINOPHEN 650 MG: 325 TABLET ORAL at 06:15

## 2024-12-14 RX ADMIN — SODIUM CHLORIDE, PRESERVATIVE FREE 10 ML: 5 INJECTION INTRAVENOUS at 09:00

## 2024-12-14 RX ADMIN — DIVALPROEX SODIUM 500 MG: 500 TABLET, DELAYED RELEASE ORAL at 10:20

## 2024-12-14 RX ADMIN — DIVALPROEX SODIUM 500 MG: 500 TABLET, DELAYED RELEASE ORAL at 18:44

## 2024-12-14 RX ADMIN — MEMANTINE 10 MG: 10 TABLET ORAL at 10:22

## 2024-12-14 RX ADMIN — ATORVASTATIN CALCIUM 40 MG: 40 TABLET, FILM COATED ORAL at 23:10

## 2024-12-14 RX ADMIN — SERTRALINE HYDROCHLORIDE 200 MG: 50 TABLET ORAL at 10:20

## 2024-12-14 RX ADMIN — LACOSAMIDE 200 MG: 100 TABLET, FILM COATED ORAL at 23:10

## 2024-12-14 RX ADMIN — ENOXAPARIN SODIUM 30 MG: 100 INJECTION SUBCUTANEOUS at 18:45

## 2024-12-14 RX ADMIN — LEVETIRACETAM 1500 MG: 500 TABLET, FILM COATED ORAL at 23:10

## 2024-12-14 RX ADMIN — OXYCODONE HYDROCHLORIDE 10 MG: 10 TABLET ORAL at 23:10

## 2024-12-14 RX ADMIN — SENNOSIDES AND DOCUSATE SODIUM 1 TABLET: 50; 8.6 TABLET ORAL at 10:20

## 2024-12-14 RX ADMIN — SODIUM CHLORIDE, PRESERVATIVE FREE 10 ML: 5 INJECTION INTRAVENOUS at 23:12

## 2024-12-14 RX ADMIN — OXYCODONE HYDROCHLORIDE 10 MG: 10 TABLET ORAL at 10:21

## 2024-12-14 RX ADMIN — OXYCODONE HYDROCHLORIDE 10 MG: 10 TABLET ORAL at 14:22

## 2024-12-14 RX ADMIN — MEMANTINE 10 MG: 10 TABLET ORAL at 23:10

## 2024-12-14 RX ADMIN — Medication 100 MG: at 10:20

## 2024-12-14 RX ADMIN — MELATONIN TAB 3 MG 6 MG: 3 TAB at 23:10

## 2024-12-14 RX ADMIN — Medication 1000 MCG: at 23:10

## 2024-12-14 RX ADMIN — LACOSAMIDE 200 MG: 100 TABLET, FILM COATED ORAL at 10:21

## 2024-12-14 RX ADMIN — OXYCODONE HYDROCHLORIDE 10 MG: 10 TABLET ORAL at 06:15

## 2024-12-14 RX ADMIN — FOLIC ACID 0.5 MG: 1 TABLET ORAL at 10:21

## 2024-12-14 RX ADMIN — LEVETIRACETAM 1500 MG: 500 TABLET, FILM COATED ORAL at 10:20

## 2024-12-14 RX ADMIN — DONEPEZIL HYDROCHLORIDE 10 MG: 10 TABLET ORAL at 23:10

## 2024-12-14 RX ADMIN — SENNOSIDES AND DOCUSATE SODIUM 1 TABLET: 50; 8.6 TABLET ORAL at 23:10

## 2024-12-14 RX ADMIN — ACETAMINOPHEN 650 MG: 325 TABLET ORAL at 23:14

## 2024-12-14 RX ADMIN — ACETAMINOPHEN 650 MG: 325 TABLET ORAL at 14:23

## 2024-12-14 RX ADMIN — Medication 1000 MCG: at 10:21

## 2024-12-14 ASSESSMENT — PAIN SCALES - GENERAL
PAINLEVEL_OUTOF10: 6
PAINLEVEL_OUTOF10: 8
PAINLEVEL_OUTOF10: 6
PAINLEVEL_OUTOF10: 8
PAINLEVEL_OUTOF10: 7
PAINLEVEL_OUTOF10: 9

## 2024-12-14 ASSESSMENT — PAIN DESCRIPTION - ORIENTATION
ORIENTATION: LEFT

## 2024-12-14 ASSESSMENT — PAIN - FUNCTIONAL ASSESSMENT
PAIN_FUNCTIONAL_ASSESSMENT: PREVENTS OR INTERFERES SOME ACTIVE ACTIVITIES AND ADLS

## 2024-12-14 ASSESSMENT — PAIN DESCRIPTION - LOCATION
LOCATION: HIP
LOCATION: LEG
LOCATION: LEG
LOCATION: HIP

## 2024-12-14 ASSESSMENT — PAIN DESCRIPTION - FREQUENCY: FREQUENCY: CONTINUOUS

## 2024-12-14 ASSESSMENT — PAIN DESCRIPTION - DESCRIPTORS
DESCRIPTORS: ACHING
DESCRIPTORS: ACHING;THROBBING
DESCRIPTORS: ACHING;DISCOMFORT
DESCRIPTORS: ACHING

## 2024-12-14 ASSESSMENT — PAIN DESCRIPTION - ONSET: ONSET: ON-GOING

## 2024-12-14 ASSESSMENT — PAIN DESCRIPTION - PAIN TYPE
TYPE: SURGICAL PAIN
TYPE: SURGICAL PAIN

## 2024-12-14 NOTE — PLAN OF CARE
Problem: Chronic Conditions and Co-morbidities  Goal: Patient's chronic conditions and co-morbidity symptoms are monitored and maintained or improved  12/13/2024 2254 by Patito Diane LPN  Outcome: Progressing  12/13/2024 0914 by Shaneka Love RN  Outcome: Progressing     Problem: Discharge Planning  Goal: Discharge to home or other facility with appropriate resources  12/13/2024 2254 by Patito Diane LPN  Outcome: Progressing  12/13/2024 0914 by Shaneka Love RN  Outcome: Progressing     Problem: Pain  Goal: Verbalizes/displays adequate comfort level or baseline comfort level  12/13/2024 2254 by Patito Diane LPN  Outcome: Progressing  12/13/2024 0914 by Shaneka Love RN  Outcome: Progressing     Problem: Safety - Adult  Goal: Free from fall injury  12/13/2024 2254 by Patito Diane LPN  Outcome: Progressing  12/13/2024 0914 by Shaneka Love RN  Outcome: Progressing     Problem: ABCDS Injury Assessment  Goal: Absence of physical injury  12/13/2024 2254 by Patito Diane LPN  Outcome: Progressing  12/13/2024 0914 by Shaneka Love RN  Outcome: Progressing     Problem: Skin/Tissue Integrity  Goal: Absence of new skin breakdown  Description: 1.  Monitor for areas of redness and/or skin breakdown  2.  Assess vascular access sites hourly  3.  Every 4-6 hours minimum:  Change oxygen saturation probe site  4.  Every 4-6 hours:  If on nasal continuous positive airway pressure, respiratory therapy assess nares and determine need for appliance change or resting period.  12/13/2024 2254 by Patito Diane LPN  Outcome: Progressing  12/13/2024 0914 by Shaneka Love RN  Outcome: Progressing

## 2024-12-14 NOTE — PROGRESS NOTES
V2.0    [facility] Progress Note      Name:  Brent Degroot /Age/Sex: 1960  (64 y.o. male)   MRN & CSN:  [id number] & [id number] Encounter Date/Time: 2024 7:38 AM EST   Location:  [facility]88-A PCP: Damon Esquivel MD     Attending:Sudheer Mcfarlane*       Hospital Day: 6    Assessment and Recommendations   Brent Degroot is a 64 y.o. male with pmh of PMHx of CVA with left-sided residual deficits, seizure disorder, advanced dementia without behavioral disturbances and depression who presents with Closed left hip fracture, initial encounter (HCC)      Plan:   Acute anemia, postop and dilutional  Patient with progressive drop in his hemoglobin from 11.9 on admission to 6.1 .  --He is status post transfusion of 1 unit, hemoglobin 8.7 this a.m, Site is not showing any hematomas and thigh is not distended or taut.  --Trend H&H and transfuse for hemoglobin less than 7.  If it continues to trend down, will scan and obtain iron panel    Closed left hip fracture  Reported missing last step going down stairs leading to fall on left hip. No head trauma or LOC. On Plavix.   --NVI distally. XR showed intertrochanteric fracture of left femur without dislocation.  --ED provider discussed with Ortho, and patient is POD 2, appreciate Ortho recs.  Continue DVT prophylaxis with Lovenox, recommendations from Ortho is 4 weeks of 81 mg of ASA twice daily.  --PT OT recs appreciated and and ARU has accepted, pre-CERT is pending  --Analgesics as needed especially prior to therapy     Elevated blood pressure -resolved  Not on antihypertensive meds at home.   --Blood pressure is normotensive at this time, continue to monitor     Hx of CVA with left-sided residual deficits  --Continue statin, will hold Plavix at this time and monitor hemoglobin    Seizure disorder  --Continue Depakote, Vimpat and Keppra.       Advanced dementia without behavioral disturbances  A&Ox3, attentive.  --Continue Aricept and    K 3.8 3.8 3.5    106 103   CO2 28 30 30   BUN 18 16 11   CREATININE 0.9 0.8 0.6*   GLUCOSE 137* 117* 105*     Hepatic: No results for input(s): \"AST\", \"ALT\", \"BILITOT\", \"ALKPHOS\" in the last 72 hours.    Invalid input(s): \"ALB\"  Lipids:   Lab Results   Component Value Date/Time    CHOL 197 12/26/2017 04:34 AM    HDL 71 02/13/2019 10:25 AM    TRIG 105 12/26/2017 04:34 AM     Hemoglobin A1C:   Lab Results   Component Value Date/Time    LABA1C 6.0 11/23/2014 05:20 AM     TSH: No results found for: \"TSH\"  Troponin:   Lab Results   Component Value Date/Time    TROPONINT <0.010 07/24/2018 03:16 PM    TROPONINT <0.010 12/26/2017 04:34 AM    TROPONINT <0.010 12/25/2017 08:20 PM     Lactic Acid: No results for input(s): \"LACTA\" in the last 72 hours.  BNP: No results for input(s): \"PROBNP\" in the last 72 hours.  UA:  Lab Results   Component Value Date/Time    NITRU NEGATIVE 10/17/2018 09:05 AM    COLORU YELLOW 10/17/2018 09:05 AM    PHUR 5.0 10/17/2018 09:05 AM    WBCUA <1 10/17/2018 09:05 AM    RBCUA <1 10/17/2018 09:05 AM    MUCUS RARE 07/24/2018 09:24 PM    TRICHOMONAS NONE SEEN 10/17/2018 09:05 AM    BACTERIA NEGATIVE 10/17/2018 09:05 AM    CLARITYU CLEAR 10/17/2018 09:05 AM    LEUKOCYTESUR NEGATIVE 10/17/2018 09:05 AM    UROBILINOGEN NORMAL 10/17/2018 09:05 AM    BILIRUBINUR NEGATIVE 10/17/2018 09:05 AM    BLOODU NEGATIVE 10/17/2018 09:05 AM    GLUCOSEU NEGATIVE 10/17/2018 09:05 AM    KETUA NEGATIVE 10/17/2018 09:05 AM     Urine Cultures: No results found for: \"LABURIN\"  Blood Cultures: No results found for: \"BC\"  No results found for: \"BLOODCULT2\"  Organism: No results found for: \"ORG\"      Electronically signed by Sudheer Mcfarlane MD on 12/14/2024 at 7:57 AM

## 2024-12-14 NOTE — PROGRESS NOTES
Physical Therapy    Physical Therapy Treatment Note  Name: Brent Degroot MRN: [id number] :   1960   Date:  2024   Admission Date: 2024 Room:  [address] [place] [address]   Restrictions/Precautions:          WBAT L LE, general precautions, fall   Communication with other providers:  per nurse pt was given pain meds about  45 minutes before tx session  Subjective:  Patient states:  pt agreeable to tx  Pain:   Location, Type, Intensity (0/10 to 10/10):  pt c/o pain but did not rate and encouraged and educated to breath and not stiffen up and hold breath during mobility and ex.  Objective:    Observation:  alert and oriented    Treatment, including education/measures:  Sup to sit with pt needing increased time and effort to slowly lift left using right LE and UE to assist  Sit<=>stand from bed, commode and to chair cga/min assist and cues for hand placement for safety. Pt needs increased time to slowly lower his self down.  Pt was able to do dejah care in sitting sba. Pt needing extended time on commode  Amb with rw 10' x 2 cga. Pt cued to not walk stiff legged and bend left knee with gt but pt had poor compliance. Pt has very slow and guarded gt.  Ex in sitting and long sitting with written copy  Trunk stretches with deep breathing  10 reps aps  10 reps quad set. 10 reps glut sets. Pt needing cues to breath and not close eyes and hold breath.  10 reps SLRs with leg  assist and cues to breath  10 reps laqs with leg  assist  Safety  Patient left safely in the chair, with call light/phone in reach with alarm applied. Gait belt was used for transfers and gait.   Safety  Patient left safely in the chair, with call light/phone in reach with alarm applied. Gait belt was used for transfers and gait.   Assessment / Impression:      Patient's tolerance of treatment:  good   Adverse Reaction: na  Significant change in status and impact:  na  Barriers to improvement:  strength and safety   Plan for Next Session:

## 2024-12-14 NOTE — PROGRESS NOTES
Occupational Therapy Treatment Note  Name: Brent Degroot MRN: [id number] :   1960   Date:  2024   Admission Date: 2024 Room:  [address] [place] [address]-A     Primary Problem:  The primary encounter diagnosis was Closed left hip fracture, initial encounter ([facility]). A diagnosis of Closed nondisplaced intertrochanteric fracture of left femur, initial encounter ([facility]) was also pertinent to this visit.  Past Medical History:   Diagnosis Date    Cerebral artery occlusion with cerebral infarction ([facility])     Hypertension     Seizures ([facility])          Communication with other providers:  RN     Subjective:  Patient states:  \"Not my first rodeo babe\"  Pain: c/o pain in LLE, does not rate. Reported nursing to be managing pain to be tolerable for mobilizing.    Restrictions: general, fall, WBAT LLE  No one at bedside    Objective:    Observation:  pt was seated EOB upon arrival with breakfast, agreeable to session  Objective Measures:  VSS    Treatment, including education:    Therapeutic Activity Training:   Therapeutic activity training was instructed today.  Cues were given for safety, sequence, UE/LE placement, visual cues, and balance.    Activities performed today included:    Seated balance:  Pt tolerated static and light dynamic seated tasks at EOB SBA with no noted LOB throughout.     STS:  Pt completed STS from EOB and to FWW with CGA and inc time/effort to achieve full upright.     SPT:  Pt completed from EOB and to recliner with Bhargavi and min cues for safety.    Scooting:  Pt completed retro scooting in recliner with Bhargavi for support at LLE.     Self Care Training:   Self care training was performed today.  Cues were given for safety, sequence, UE/LE placement, visual cues, and balance.    Activities performed today included:    Feeding/eating:  Pt engaged at EOB with SetupA.       Education: Role of OT, OT POC, safety, benefits of EOB/OOB activity, rationale for treatment. Pt was educated on use of AE and provided

## 2024-12-15 VITALS
HEIGHT: 62 IN | TEMPERATURE: 98.5 F | SYSTOLIC BLOOD PRESSURE: 120 MMHG | WEIGHT: 103 LBS | DIASTOLIC BLOOD PRESSURE: 76 MMHG | BODY MASS INDEX: 18.95 KG/M2 | HEART RATE: 84 BPM | OXYGEN SATURATION: 92 % | RESPIRATION RATE: 17 BRPM

## 2024-12-15 LAB
BILIRUB UR QL STRIP: NEGATIVE
CLARITY UR: CLEAR
COLOR UR: YELLOW
COMMENT: ABNORMAL
ERYTHROCYTE [DISTWIDTH] IN BLOOD BY AUTOMATED COUNT: 14.4 % (ref 11.7–14.9)
GLUCOSE UR STRIP-MCNC: NEGATIVE MG/DL
HCT VFR BLD AUTO: 21.9 % (ref 42–52)
HCT VFR BLD AUTO: 23.6 % (ref 42–52)
HGB BLD-MCNC: 7.1 G/DL (ref 13.5–18)
HGB BLD-MCNC: 7.7 G/DL (ref 13.5–18)
HGB UR QL STRIP.AUTO: NEGATIVE
KETONES UR STRIP-MCNC: ABNORMAL MG/DL
LEUKOCYTE ESTERASE UR QL STRIP: NEGATIVE
MCH RBC QN AUTO: 33 PG (ref 27–31)
MCHC RBC AUTO-ENTMCNC: 32.4 G/DL (ref 32–36)
MCV RBC AUTO: 101.9 FL (ref 78–100)
NITRITE UR QL STRIP: NEGATIVE
PH UR STRIP: 7 [PH] (ref 5–8)
PLATELET # BLD AUTO: 238 K/UL (ref 140–440)
PMV BLD AUTO: 9.2 FL (ref 7.5–11.1)
PROT UR STRIP-MCNC: NEGATIVE MG/DL
RBC # BLD AUTO: 2.15 M/UL (ref 4.6–6.2)
SP GR UR STRIP: 1.01 (ref 1–1.03)
UROBILINOGEN UR STRIP-ACNC: 2 EU/DL (ref 0–1)
WBC OTHER # BLD: 4.9 K/UL (ref 4–10.5)

## 2024-12-15 PROCEDURE — 94150 VITAL CAPACITY TEST: CPT

## 2024-12-15 PROCEDURE — 6360000002 HC RX W HCPCS: Performed by: STUDENT IN AN ORGANIZED HEALTH CARE EDUCATION/TRAINING PROGRAM

## 2024-12-15 PROCEDURE — 6370000000 HC RX 637 (ALT 250 FOR IP): Performed by: STUDENT IN AN ORGANIZED HEALTH CARE EDUCATION/TRAINING PROGRAM

## 2024-12-15 PROCEDURE — 81003 URINALYSIS AUTO W/O SCOPE: CPT

## 2024-12-15 PROCEDURE — 85018 HEMOGLOBIN: CPT

## 2024-12-15 PROCEDURE — 94761 N-INVAS EAR/PLS OXIMETRY MLT: CPT

## 2024-12-15 PROCEDURE — 1200000000 HC SEMI PRIVATE

## 2024-12-15 PROCEDURE — 85027 COMPLETE CBC AUTOMATED: CPT

## 2024-12-15 PROCEDURE — 2580000003 HC RX 258: Performed by: STUDENT IN AN ORGANIZED HEALTH CARE EDUCATION/TRAINING PROGRAM

## 2024-12-15 PROCEDURE — 85014 HEMATOCRIT: CPT

## 2024-12-15 PROCEDURE — 36415 COLL VENOUS BLD VENIPUNCTURE: CPT

## 2024-12-15 RX ADMIN — SENNOSIDES AND DOCUSATE SODIUM 1 TABLET: 50; 8.6 TABLET ORAL at 09:04

## 2024-12-15 RX ADMIN — ATORVASTATIN CALCIUM 40 MG: 40 TABLET, FILM COATED ORAL at 20:39

## 2024-12-15 RX ADMIN — DIVALPROEX SODIUM 500 MG: 500 TABLET, DELAYED RELEASE ORAL at 18:53

## 2024-12-15 RX ADMIN — SERTRALINE HYDROCHLORIDE 200 MG: 50 TABLET ORAL at 09:04

## 2024-12-15 RX ADMIN — MEMANTINE 10 MG: 10 TABLET ORAL at 20:39

## 2024-12-15 RX ADMIN — Medication 1000 MCG: at 09:04

## 2024-12-15 RX ADMIN — DIVALPROEX SODIUM 500 MG: 500 TABLET, DELAYED RELEASE ORAL at 09:04

## 2024-12-15 RX ADMIN — SODIUM CHLORIDE, PRESERVATIVE FREE 10 ML: 5 INJECTION INTRAVENOUS at 20:40

## 2024-12-15 RX ADMIN — OXYCODONE HYDROCHLORIDE 5 MG: 5 TABLET ORAL at 09:02

## 2024-12-15 RX ADMIN — Medication 100 MG: at 09:04

## 2024-12-15 RX ADMIN — DONEPEZIL HYDROCHLORIDE 10 MG: 10 TABLET ORAL at 20:39

## 2024-12-15 RX ADMIN — OXYCODONE HYDROCHLORIDE 10 MG: 10 TABLET ORAL at 16:38

## 2024-12-15 RX ADMIN — ACETAMINOPHEN 650 MG: 325 TABLET ORAL at 16:38

## 2024-12-15 RX ADMIN — LACOSAMIDE 200 MG: 100 TABLET, FILM COATED ORAL at 09:04

## 2024-12-15 RX ADMIN — MELATONIN TAB 3 MG 6 MG: 3 TAB at 20:39

## 2024-12-15 RX ADMIN — SENNOSIDES AND DOCUSATE SODIUM 1 TABLET: 50; 8.6 TABLET ORAL at 20:39

## 2024-12-15 RX ADMIN — LEVETIRACETAM 1500 MG: 500 TABLET, FILM COATED ORAL at 09:04

## 2024-12-15 RX ADMIN — LACOSAMIDE 200 MG: 100 TABLET, FILM COATED ORAL at 20:39

## 2024-12-15 RX ADMIN — ENOXAPARIN SODIUM 30 MG: 100 INJECTION SUBCUTANEOUS at 18:53

## 2024-12-15 RX ADMIN — FOLIC ACID 0.5 MG: 1 TABLET ORAL at 09:04

## 2024-12-15 RX ADMIN — Medication 1000 MCG: at 20:39

## 2024-12-15 RX ADMIN — ACETAMINOPHEN 650 MG: 325 TABLET ORAL at 20:39

## 2024-12-15 RX ADMIN — SODIUM CHLORIDE, PRESERVATIVE FREE 10 ML: 5 INJECTION INTRAVENOUS at 09:07

## 2024-12-15 RX ADMIN — LEVETIRACETAM 1500 MG: 500 TABLET, FILM COATED ORAL at 20:39

## 2024-12-15 RX ADMIN — OXYCODONE HYDROCHLORIDE 10 MG: 10 TABLET ORAL at 03:37

## 2024-12-15 RX ADMIN — MEMANTINE 10 MG: 10 TABLET ORAL at 09:04

## 2024-12-15 ASSESSMENT — PAIN DESCRIPTION - ORIENTATION
ORIENTATION: LEFT

## 2024-12-15 ASSESSMENT — PAIN DESCRIPTION - ONSET: ONSET: ON-GOING

## 2024-12-15 ASSESSMENT — PAIN DESCRIPTION - DESCRIPTORS
DESCRIPTORS: ACHING;THROBBING
DESCRIPTORS: ACHING;THROBBING
DESCRIPTORS: ACHING

## 2024-12-15 ASSESSMENT — PAIN - FUNCTIONAL ASSESSMENT
PAIN_FUNCTIONAL_ASSESSMENT: PREVENTS OR INTERFERES SOME ACTIVE ACTIVITIES AND ADLS

## 2024-12-15 ASSESSMENT — PAIN DESCRIPTION - LOCATION
LOCATION: HIP

## 2024-12-15 ASSESSMENT — PAIN DESCRIPTION - FREQUENCY: FREQUENCY: CONTINUOUS

## 2024-12-15 ASSESSMENT — PAIN SCALES - GENERAL
PAINLEVEL_OUTOF10: 8
PAINLEVEL_OUTOF10: 6
PAINLEVEL_OUTOF10: 7

## 2024-12-15 ASSESSMENT — PAIN SCALES - WONG BAKER: WONGBAKER_NUMERICALRESPONSE: NO HURT

## 2024-12-15 ASSESSMENT — PAIN DESCRIPTION - PAIN TYPE: TYPE: SURGICAL PAIN

## 2024-12-15 NOTE — PROGRESS NOTES
Physical Therapy  Pt declined therapy at this time reported just back to bed after being up most of day, getting a bath and walking with nursing.

## 2024-12-15 NOTE — PROGRESS NOTES
V2.0    [facility] Progress Note      Name:  Brent Degroot /Age/Sex: 1960  (64 y.o. male)   MRN & CSN:  [id number] & [id number] Encounter Date/Time: 12/15/2024 7:38 AM EST   Location:  [facility]-A PCP: Damon Esquivel MD     Attending:Sudheer Mcfarlane*       Hospital Day: 7    Assessment and Recommendations   Brent Degroot is a 64 y.o. male with pmh of PMHx of CVA with left-sided residual deficits, seizure disorder, advanced dementia without behavioral disturbances and depression who presents with Closed left hip fracture, initial encounter (HCC)      Plan:   Acute anemia, postop and dilutional  Patient with progressive drop in his hemoglobin from 11.9 on admission to 6.1 .  --He is status post transfusion of 1 unit, hemoglobin 7.1 this a.m, repeat this afternoon. Site is not showing any hematomas and thigh is not distended or taut.  --Trend H&H and transfuse for hemoglobin less than 7.  If it continues to trend down, will scan and obtain iron panel    Closed left hip fracture  Reported missing last step going down stairs leading to fall on left hip. No head trauma or LOC. On Plavix.   --NVI distally. XR showed intertrochanteric fracture of left femur without dislocation.  --ED provider discussed with Ortho, and patient is POD 2, appreciate Ortho recs.  Continue DVT prophylaxis with Lovenox, recommendations from Ortho is 4 weeks of 81 mg of ASA twice daily.  --PT OT recs appreciated and and ARU has accepted, pre-CERT is pending  --Analgesics as needed especially prior to therapy     Elevated blood pressure -resolved  Not on antihypertensive meds at home.   --Blood pressure is normotensive at this time, continue to monitor     Hx of CVA with left-sided residual deficits  --Continue statin, will hold Plavix at this time and monitor hemoglobin    Seizure disorder  --Continue Depakote, Vimpat and Keppra.       Advanced dementia without behavioral disturbances  A&Ox3,

## 2024-12-16 LAB
ERYTHROCYTE [DISTWIDTH] IN BLOOD BY AUTOMATED COUNT: 14.5 % (ref 11.7–14.9)
HCT VFR BLD AUTO: 23.3 % (ref 42–52)
HGB BLD-MCNC: 7.4 G/DL (ref 13.5–18)
MCH RBC QN AUTO: 32.2 PG (ref 27–31)
MCHC RBC AUTO-ENTMCNC: 31.8 G/DL (ref 32–36)
MCV RBC AUTO: 101.3 FL (ref 78–100)
PLATELET # BLD AUTO: 294 K/UL (ref 140–440)
PMV BLD AUTO: 8.9 FL (ref 7.5–11.1)
RBC # BLD AUTO: 2.3 M/UL (ref 4.6–6.2)
WBC OTHER # BLD: 5.3 K/UL (ref 4–10.5)

## 2024-12-16 PROCEDURE — 6370000000 HC RX 637 (ALT 250 FOR IP): Performed by: STUDENT IN AN ORGANIZED HEALTH CARE EDUCATION/TRAINING PROGRAM

## 2024-12-16 PROCEDURE — 97112 NEUROMUSCULAR REEDUCATION: CPT

## 2024-12-16 PROCEDURE — 36415 COLL VENOUS BLD VENIPUNCTURE: CPT

## 2024-12-16 PROCEDURE — 94761 N-INVAS EAR/PLS OXIMETRY MLT: CPT

## 2024-12-16 PROCEDURE — 97535 SELF CARE MNGMENT TRAINING: CPT

## 2024-12-16 PROCEDURE — 2580000003 HC RX 258: Performed by: STUDENT IN AN ORGANIZED HEALTH CARE EDUCATION/TRAINING PROGRAM

## 2024-12-16 PROCEDURE — 1200000000 HC SEMI PRIVATE

## 2024-12-16 PROCEDURE — 97116 GAIT TRAINING THERAPY: CPT

## 2024-12-16 PROCEDURE — 97530 THERAPEUTIC ACTIVITIES: CPT

## 2024-12-16 PROCEDURE — 85027 COMPLETE CBC AUTOMATED: CPT

## 2024-12-16 PROCEDURE — 82270 OCCULT BLOOD FECES: CPT

## 2024-12-16 PROCEDURE — 6360000002 HC RX W HCPCS: Performed by: STUDENT IN AN ORGANIZED HEALTH CARE EDUCATION/TRAINING PROGRAM

## 2024-12-16 RX ORDER — CALCIUM CARBONATE 500 MG/1
500 TABLET, CHEWABLE ORAL 3 TIMES DAILY PRN
Status: DISCONTINUED | OUTPATIENT
Start: 2024-12-16 | End: 2024-12-19 | Stop reason: HOSPADM

## 2024-12-16 RX ADMIN — ACETAMINOPHEN 650 MG: 325 TABLET ORAL at 01:53

## 2024-12-16 RX ADMIN — CALCIUM CARBONATE 500 MG: 500 TABLET, CHEWABLE ORAL at 21:42

## 2024-12-16 RX ADMIN — MEMANTINE 10 MG: 10 TABLET ORAL at 21:27

## 2024-12-16 RX ADMIN — SENNOSIDES AND DOCUSATE SODIUM 1 TABLET: 50; 8.6 TABLET ORAL at 21:27

## 2024-12-16 RX ADMIN — SODIUM CHLORIDE, PRESERVATIVE FREE 10 ML: 5 INJECTION INTRAVENOUS at 21:30

## 2024-12-16 RX ADMIN — ACETAMINOPHEN 650 MG: 325 TABLET ORAL at 14:48

## 2024-12-16 RX ADMIN — Medication 1000 MCG: at 09:34

## 2024-12-16 RX ADMIN — OXYCODONE HYDROCHLORIDE 10 MG: 10 TABLET ORAL at 21:30

## 2024-12-16 RX ADMIN — MELATONIN TAB 3 MG 6 MG: 3 TAB at 21:27

## 2024-12-16 RX ADMIN — OXYCODONE HYDROCHLORIDE 5 MG: 5 TABLET ORAL at 01:53

## 2024-12-16 RX ADMIN — Medication 1000 MCG: at 21:27

## 2024-12-16 RX ADMIN — FOLIC ACID 0.5 MG: 1 TABLET ORAL at 09:34

## 2024-12-16 RX ADMIN — LACOSAMIDE 200 MG: 100 TABLET, FILM COATED ORAL at 09:51

## 2024-12-16 RX ADMIN — MEMANTINE 10 MG: 10 TABLET ORAL at 09:34

## 2024-12-16 RX ADMIN — CALCIUM CARBONATE 500 MG: 500 TABLET, CHEWABLE ORAL at 14:48

## 2024-12-16 RX ADMIN — SERTRALINE HYDROCHLORIDE 200 MG: 50 TABLET ORAL at 11:36

## 2024-12-16 RX ADMIN — LEVETIRACETAM 1500 MG: 500 TABLET, FILM COATED ORAL at 09:35

## 2024-12-16 RX ADMIN — DIVALPROEX SODIUM 500 MG: 500 TABLET, DELAYED RELEASE ORAL at 09:37

## 2024-12-16 RX ADMIN — SODIUM CHLORIDE, PRESERVATIVE FREE 20 ML: 5 INJECTION INTRAVENOUS at 21:30

## 2024-12-16 RX ADMIN — ENOXAPARIN SODIUM 30 MG: 100 INJECTION SUBCUTANEOUS at 17:43

## 2024-12-16 RX ADMIN — ACETAMINOPHEN 650 MG: 325 TABLET ORAL at 21:27

## 2024-12-16 RX ADMIN — DIVALPROEX SODIUM 500 MG: 500 TABLET, DELAYED RELEASE ORAL at 17:42

## 2024-12-16 RX ADMIN — Medication 100 MG: at 09:40

## 2024-12-16 RX ADMIN — ATORVASTATIN CALCIUM 40 MG: 40 TABLET, FILM COATED ORAL at 21:27

## 2024-12-16 RX ADMIN — LACOSAMIDE 200 MG: 100 TABLET, FILM COATED ORAL at 21:27

## 2024-12-16 RX ADMIN — ACETAMINOPHEN 650 MG: 325 TABLET ORAL at 09:34

## 2024-12-16 RX ADMIN — SODIUM CHLORIDE, PRESERVATIVE FREE 10 ML: 5 INJECTION INTRAVENOUS at 09:40

## 2024-12-16 RX ADMIN — LEVETIRACETAM 1500 MG: 500 TABLET, FILM COATED ORAL at 21:27

## 2024-12-16 RX ADMIN — OXYCODONE HYDROCHLORIDE 10 MG: 10 TABLET ORAL at 17:42

## 2024-12-16 RX ADMIN — SODIUM CHLORIDE, PRESERVATIVE FREE 10 ML: 5 INJECTION INTRAVENOUS at 09:34

## 2024-12-16 RX ADMIN — OXYCODONE HYDROCHLORIDE 10 MG: 10 TABLET ORAL at 09:31

## 2024-12-16 RX ADMIN — DONEPEZIL HYDROCHLORIDE 10 MG: 10 TABLET ORAL at 21:27

## 2024-12-16 RX ADMIN — SENNOSIDES AND DOCUSATE SODIUM 1 TABLET: 50; 8.6 TABLET ORAL at 09:34

## 2024-12-16 ASSESSMENT — PAIN DESCRIPTION - ONSET: ONSET: ON-GOING

## 2024-12-16 ASSESSMENT — PAIN DESCRIPTION - LOCATION
LOCATION: HIP

## 2024-12-16 ASSESSMENT — PAIN SCALES - GENERAL
PAINLEVEL_OUTOF10: 0
PAINLEVEL_OUTOF10: 0
PAINLEVEL_OUTOF10: 5
PAINLEVEL_OUTOF10: 8
PAINLEVEL_OUTOF10: 0
PAINLEVEL_OUTOF10: 6
PAINLEVEL_OUTOF10: 5
PAINLEVEL_OUTOF10: 9

## 2024-12-16 ASSESSMENT — PAIN DESCRIPTION - ORIENTATION
ORIENTATION: LEFT

## 2024-12-16 ASSESSMENT — PAIN DESCRIPTION - PAIN TYPE: TYPE: SURGICAL PAIN

## 2024-12-16 ASSESSMENT — PAIN DESCRIPTION - DESCRIPTORS
DESCRIPTORS: ACHING

## 2024-12-16 ASSESSMENT — PAIN DESCRIPTION - FREQUENCY: FREQUENCY: CONTINUOUS

## 2024-12-16 NOTE — CARE COORDINATION
Chart reviewed and patient discussed in IDR. Patient refused to cooperate with therapy over the weekend and he has been reminded in order to be accepted to ARU, he will need to participate. ARU following for updated therapy recs.

## 2024-12-16 NOTE — PROGRESS NOTES
Physical Therapy Treatment Note  Name: Brent Degroot MRN: [id number] :   1960   Date:  2024   Admission Date: 2024 Room:  [address] [place] [address]     Restrictions/Precautions:          WBAT L LE, general precautions, fall risk    Communication with other providers:  RN, OT    Subjective:  Patient states:  \"I want to what I can\"  Pain:   Location, Type, Intensity (0/10 to 10/10):  8/10 pain L hip at sx site    Objective:    Observation:  pt supine in bed upon entry and agreeable to session    Treatment, including education/measures:    Bed mobility: SBA with increased time and use of gait belt as leg  to advance L LE. Cues provided for sequencing    Transfers: pt completed STS from EOB CGA, to/from chair x2 trials CGA, and to/from commode CGA. Cues provided for hand placement and sequencing. Poor eccentric control upon descent for second chair transfer.    Balance: pt sat EOB CGA progressing to close SBA. Pt stood at sink performing dynamic UE activities CGA with one LOB requiring min A to recover. Cues provided for foot positioning and proper URI. Pt stood during pericare CGA while performing dynamic activities with bilateral UEs.    Gait: pt ambulated 10' + 8' + 10' with RW CGA with decreased mia, decreased stance phase on L LE, and minimal Wb'ing through L LE. Cues provided for walker management and gait pattern throughout    Assessment / Impression:    Pt up in chair at end of session with needs in reach, alarm on, and RN present    Patient's tolerance of treatment:  fair   Adverse Reaction: n/a  Significant change in status and impact:  n/a  Barriers to improvement:  decreased endurance, increased pain    Plan for Next Session:    Continue to address transfer training and gait training in future sessions    Time in:  902  Time out:  940  Timed treatment minutes:  38  Total treatment time:  38    Previously filed items:  Social/Functional History  Lives With: Other (comment) (Brother)  Type

## 2024-12-16 NOTE — PROGRESS NOTES
Naemenda with delirium precautions, avoid benzodiazepines and anticholinergic medications.     Depression  --Continue Zoloft.      Diet ADULT DIET; Regular  ADULT ORAL NUTRITION SUPPLEMENT; Lunch; Standard High Calorie/High Protein Oral Supplement   DVT Prophylaxis [] Lovenox, []  Heparin, [x] SCDs, [] Ambulation,  [] Eliquis, [] Xarelto  [] Coumadin   Code Status Full Code   Disposition From: Home  Expected Disposition: SNF versus ARU  Estimated Date of Discharge: 1 to 2 days  Patient requires continued admission due to  pending placement   Surrogate Decision Maker/ POA Sibling     Personally reviewed Lab Studies and Imaging     Discussed management of the case with the IDR team    Drugs that require monitoring for toxicity include Lovenox and the method of monitoring was CBC        Subjective:     Chief Complaint: Status post fall    Brent Degroot is a 64 y.o. male who is seen and examined at bedside. He is endorsing feeling well but complaining about urinary frequency.  Discussed POC and he is amenable. NAEO.  Sister at bedside, updates provided.      Review of Systems:      Pertinent positives and negatives discussed in HPI    Objective:     Intake/Output Summary (Last 24 hours) at 12/16/2024 0726  Last data filed at 12/15/2024 2046  Gross per 24 hour   Intake --   Output 200 ml   Net -200 ml      Vitals:   Vitals:    12/15/24 2030 12/15/24 2300 12/16/24 0153 12/16/24 0204   BP:   102/64 115/67   Pulse: 84 86     Resp:   17 18   Temp:   98.1 °F (36.7 °C) 98.5 °F (36.9 °C)   TempSrc:   Oral Oral   SpO2:   92% 93%   Weight:       Height:             Physical Exam:    General: NAD  Eyes: EOMI  ENT: neck supple  Cardiovascular: Regular rate.  Respiratory: Clear to auscultation  Gastrointestinal: Soft, non tender  Genitourinary: no suprapubic tenderness  Musculoskeletal: No edema, staples in place, no drainage or erythema  Skin: warm, dry  Neuro: Alert.  Psych: Mood appropriate.         Medications:   Medications:

## 2024-12-16 NOTE — CARE COORDINATION
Patient refused therapy on 12/15.  Whiteboard placed for updated PT or OT to ensure patient doesn't refuse therapy again.     Patient is required to participate in 3hrs of therapy a day and can't refuse due to this being a requirement for admission to ARU.     Will meet with patient this AM and remind him of this.

## 2024-12-16 NOTE — PROGRESS NOTES
Occupational Therapy Treatment Note    Name: Brent Degroot MRN: [id number] :   1960   Date:  2024   Admission Date: 2024 Room:  [address] [place] [address]     Restrictions/Precautions:          general, fall, WBAT LLE     Communication with other providers: RM, PT    Subjective:  Patient states:  \"I didn't mean to refuse yesterday!\"  Pain:   8/10 pain L hip at sx site     Objective:    Observation: pt supine in bed upon arrival, agreeable to therapy   Objective Measures:  room air    Treatment, including education:  Self Care Training:   Cues were given for safety, sequence, UE/LE placement, visual cues, and balance.    Activities performed today included UB/LB dressing tasks, toileting, hand hygiene at sink    Pt performed supine to EOB transfer with SBA using gait belt for Leg  with SBA for balance safety, LE maneuvering, and hip pivot, HOB elevated and use of bed rails, Mod verbal cues for body mechanics, increased time and effort to complete. Pt performed sit to stand from EOB x1 and chair x1 with CGA for balance safety, Min verbal cues for body mechanics. Pt performed 10 ft + 8 ft + 12 ft functional mobility using 2ww with CGA for balance safety, 1 LOB when at sink with min A to correct,  Mod verbal cues for 2ww management. Pt preformed toilet transfer x2 to standard commode using R grab bar with CGA for balance safety, mod verbal cues for positioning and body mechanics. Pt performed dejah-care sitting on standard commode with SBA for balance, Min verbal cues for sequencing. Pt wash hands standing at sink with CGA for balance, Mod verbal cues for 2ww management and posture. Pt performed stand to sit to chair x2 with CGA for balance safety, Min verbal cues for body mechanics. Pt donned depends sitting in chair with Min A for lacing of L LE, pt able to lace R and pull up, Mod verbal cues for sequencing of modified dressing technique.   Pt educated on role of OT, benefits of OT, and rationale for

## 2024-12-17 LAB
ERYTHROCYTE [DISTWIDTH] IN BLOOD BY AUTOMATED COUNT: 14.5 % (ref 11.7–14.9)
HCT VFR BLD AUTO: 22.3 % (ref 42–52)
HGB BLD-MCNC: 7.2 G/DL (ref 13.5–18)
IRON SATN MFR SERPL: 27 % (ref 15–50)
IRON SERPL-MCNC: 60 UG/DL (ref 59–158)
MCH RBC QN AUTO: 32.7 PG (ref 27–31)
MCHC RBC AUTO-ENTMCNC: 32.3 G/DL (ref 32–36)
MCV RBC AUTO: 101.4 FL (ref 78–100)
PLATELET # BLD AUTO: 288 K/UL (ref 140–440)
PMV BLD AUTO: 8.9 FL (ref 7.5–11.1)
RBC # BLD AUTO: 2.2 M/UL (ref 4.6–6.2)
TIBC SERPL-MCNC: 223 UG/DL (ref 260–445)
UNSATURATED IRON BINDING CAPACITY: 163 UG/DL (ref 110–370)
WBC OTHER # BLD: 5.7 K/UL (ref 4–10.5)

## 2024-12-17 PROCEDURE — 97116 GAIT TRAINING THERAPY: CPT

## 2024-12-17 PROCEDURE — 2500000003 HC RX 250 WO HCPCS: Performed by: STUDENT IN AN ORGANIZED HEALTH CARE EDUCATION/TRAINING PROGRAM

## 2024-12-17 PROCEDURE — 6360000002 HC RX W HCPCS: Performed by: STUDENT IN AN ORGANIZED HEALTH CARE EDUCATION/TRAINING PROGRAM

## 2024-12-17 PROCEDURE — 6370000000 HC RX 637 (ALT 250 FOR IP): Performed by: STUDENT IN AN ORGANIZED HEALTH CARE EDUCATION/TRAINING PROGRAM

## 2024-12-17 PROCEDURE — 83550 IRON BINDING TEST: CPT

## 2024-12-17 PROCEDURE — 94150 VITAL CAPACITY TEST: CPT

## 2024-12-17 PROCEDURE — 2580000003 HC RX 258: Performed by: STUDENT IN AN ORGANIZED HEALTH CARE EDUCATION/TRAINING PROGRAM

## 2024-12-17 PROCEDURE — 85027 COMPLETE CBC AUTOMATED: CPT

## 2024-12-17 PROCEDURE — 83540 ASSAY OF IRON: CPT

## 2024-12-17 PROCEDURE — 1200000000 HC SEMI PRIVATE

## 2024-12-17 PROCEDURE — 36415 COLL VENOUS BLD VENIPUNCTURE: CPT

## 2024-12-17 PROCEDURE — 94761 N-INVAS EAR/PLS OXIMETRY MLT: CPT

## 2024-12-17 RX ADMIN — SENNOSIDES AND DOCUSATE SODIUM 1 TABLET: 50; 8.6 TABLET ORAL at 11:28

## 2024-12-17 RX ADMIN — ENOXAPARIN SODIUM 30 MG: 100 INJECTION SUBCUTANEOUS at 17:45

## 2024-12-17 RX ADMIN — FOLIC ACID 0.5 MG: 1 TABLET ORAL at 11:28

## 2024-12-17 RX ADMIN — LEVETIRACETAM 1500 MG: 500 TABLET, FILM COATED ORAL at 11:26

## 2024-12-17 RX ADMIN — LACOSAMIDE 200 MG: 100 TABLET, FILM COATED ORAL at 11:28

## 2024-12-17 RX ADMIN — Medication 100 MG: at 11:28

## 2024-12-17 RX ADMIN — MEMANTINE 10 MG: 10 TABLET ORAL at 20:41

## 2024-12-17 RX ADMIN — LEVETIRACETAM 1500 MG: 500 TABLET, FILM COATED ORAL at 20:41

## 2024-12-17 RX ADMIN — Medication 1000 MCG: at 11:26

## 2024-12-17 RX ADMIN — Medication 1000 MCG: at 20:41

## 2024-12-17 RX ADMIN — DIVALPROEX SODIUM 500 MG: 500 TABLET, DELAYED RELEASE ORAL at 11:28

## 2024-12-17 RX ADMIN — SERTRALINE HYDROCHLORIDE 200 MG: 50 TABLET ORAL at 11:26

## 2024-12-17 RX ADMIN — OXYCODONE HYDROCHLORIDE 10 MG: 10 TABLET ORAL at 11:26

## 2024-12-17 RX ADMIN — SENNOSIDES AND DOCUSATE SODIUM 1 TABLET: 50; 8.6 TABLET ORAL at 20:41

## 2024-12-17 RX ADMIN — SODIUM CHLORIDE, PRESERVATIVE FREE 10 ML: 5 INJECTION INTRAVENOUS at 11:15

## 2024-12-17 RX ADMIN — DIVALPROEX SODIUM 500 MG: 500 TABLET, DELAYED RELEASE ORAL at 17:45

## 2024-12-17 RX ADMIN — MELATONIN TAB 3 MG 6 MG: 3 TAB at 20:41

## 2024-12-17 RX ADMIN — ACETAMINOPHEN 650 MG: 325 TABLET ORAL at 17:45

## 2024-12-17 RX ADMIN — MEMANTINE 10 MG: 10 TABLET ORAL at 11:28

## 2024-12-17 RX ADMIN — LACOSAMIDE 200 MG: 100 TABLET, FILM COATED ORAL at 20:41

## 2024-12-17 RX ADMIN — OXYCODONE HYDROCHLORIDE 10 MG: 10 TABLET ORAL at 20:42

## 2024-12-17 RX ADMIN — DONEPEZIL HYDROCHLORIDE 10 MG: 10 TABLET ORAL at 20:41

## 2024-12-17 RX ADMIN — ACETAMINOPHEN 650 MG: 325 TABLET ORAL at 02:15

## 2024-12-17 RX ADMIN — ATORVASTATIN CALCIUM 40 MG: 40 TABLET, FILM COATED ORAL at 20:41

## 2024-12-17 RX ADMIN — OXYCODONE HYDROCHLORIDE 10 MG: 10 TABLET ORAL at 04:04

## 2024-12-17 RX ADMIN — SODIUM CHLORIDE, PRESERVATIVE FREE 10 ML: 5 INJECTION INTRAVENOUS at 20:43

## 2024-12-17 RX ADMIN — ACETAMINOPHEN 650 MG: 325 TABLET ORAL at 11:27

## 2024-12-17 ASSESSMENT — PAIN SCALES - GENERAL
PAINLEVEL_OUTOF10: 5
PAINLEVEL_OUTOF10: 0
PAINLEVEL_OUTOF10: 9
PAINLEVEL_OUTOF10: 8
PAINLEVEL_OUTOF10: 0
PAINLEVEL_OUTOF10: 8
PAINLEVEL_OUTOF10: 0
PAINLEVEL_OUTOF10: 0
PAINLEVEL_OUTOF10: 9
PAINLEVEL_OUTOF10: 8
PAINLEVEL_OUTOF10: 0
PAINLEVEL_OUTOF10: 5
PAINLEVEL_OUTOF10: 0
PAINLEVEL_OUTOF10: 9
PAINLEVEL_OUTOF10: 0

## 2024-12-17 ASSESSMENT — PAIN DESCRIPTION - FREQUENCY: FREQUENCY: CONTINUOUS

## 2024-12-17 ASSESSMENT — PAIN DESCRIPTION - LOCATION
LOCATION: HIP

## 2024-12-17 ASSESSMENT — PAIN DESCRIPTION - ONSET: ONSET: ON-GOING

## 2024-12-17 ASSESSMENT — PAIN DESCRIPTION - PAIN TYPE
TYPE: SURGICAL PAIN
TYPE: SURGICAL PAIN

## 2024-12-17 ASSESSMENT — PAIN DESCRIPTION - ORIENTATION
ORIENTATION: LEFT

## 2024-12-17 ASSESSMENT — PAIN DESCRIPTION - DESCRIPTORS
DESCRIPTORS: ACHING;DISCOMFORT
DESCRIPTORS: ACHING
DESCRIPTORS: ACHING

## 2024-12-17 ASSESSMENT — PAIN - FUNCTIONAL ASSESSMENT
PAIN_FUNCTIONAL_ASSESSMENT: PREVENTS OR INTERFERES SOME ACTIVE ACTIVITIES AND ADLS
PAIN_FUNCTIONAL_ASSESSMENT: PREVENTS OR INTERFERES SOME ACTIVE ACTIVITIES AND ADLS

## 2024-12-17 NOTE — PROGRESS NOTES
V2.0  [facility] Hospitalist Progress Note      Name:  Brent Degroot /Age/Sex: 1960  (64 y.o. male)   MRN & CSN:  [id number] & [id number] Encounter Date/Time: 2024 1:46 PM EST    Location:  [facility]1118-A PCP: Damon Esquivel MD       Hospital Day: 9    Assessment and Plan:   Brent Degroot is a 64 y.o. male with pmh as noted below who presents with Closed left hip fracture, initial encounter ([facility])      Plan:    Acute anemia, postop and dilutional  Patient with progressive drop in his hemoglobin from 11.9 on admission to 6.1 .  --He is status post transfusion of 1 unit, hemoglobin stable at 7.4 this a.m. Site is not showing any hematomas and thigh is not distended or taut.  --Trend H&H and transfuse for hemoglobin less than 7.  If it continues to trend down, will scan and obtain iron panel  --Iron panel ordered     Closed left hip fracture  Reported missing last step going down stairs leading to fall on left hip. No head trauma or LOC. On Plavix.   --NVI distally. XR showed intertrochanteric fracture of left femur without dislocation.  --ED provider discussed with Ortho, and patient is POD 6, appreciate Ortho recs.  Continue DVT prophylaxis with Lovenox, recommendations from Ortho is 4 weeks of 81 mg of ASA twice daily.  --PT OT recs appreciated and and ARU has accepted, pre-CERT is pending  --Analgesics as needed especially prior to therapy  -- Precert remains pending       Elevated blood pressure -resolved  Not on antihypertensive meds at home.   --Blood pressure is normotensive at this time, continue to monitor      Hx of CVA with left-sided residual deficits  --Continue statin, will hold Plavix at this time and monitor hemoglobin     Seizure disorder  --Continue Depakote, Vimpat and Keppra.       Advanced dementia without behavioral disturbances  A&Ox3, attentive.  --Continue Aricept and Naemenda with delirium precautions, avoid benzodiazepines and anticholinergic medications.

## 2024-12-17 NOTE — CARE COORDINATION
Met with patient and discussed participating with therapy.  Patient states he didn't mean to refuse, it was bc he just got in bed yesterday.  Patient expressed his understanding of ARU criteria and that goal is to return home with his brother at discharge from ARU.  Informed patient that ARU pre-cert is still pending at this time.  Patient expresses his understanding.     Patient did well therapy today.  Sent updated clinicals to [employer] for ARU pre-cert.  Will continue to follow for determination.

## 2024-12-17 NOTE — PROGRESS NOTES
Noted that pt. had BM during shift  during report. Last bowel movement is 12/16/24. Form stool, unable to send stool sample for GI panel d/t possible stool contamination. Night shift RN to follow up with obtaining stool sample for stool panel for this this pt.

## 2024-12-17 NOTE — PROGRESS NOTES
Comprehensive Nutrition Assessment    Type and Reason for Visit:  Initial, LOS (LOS day 8 , Low BMI 18.83 Unspecified source. No new wt(s) since 12/9/24)    Nutrition Recommendations/Plan:   Continue regular diet as ordered.   Will offer standard high siva/high pro oral nutrition supplement TID.   Please continue to document pt's po intakes of meals and supplements and snacks in pt's chart for review.   Will monitor pt's GI status, wt, skin, labs, lytes, po intakes, and POC.   Will follow pt as a moderate nutrition risk.      Malnutrition Assessment:  Malnutrition Status:  At risk for malnutrition (12/17/24 1015)    Context:  Acute Illness     Findings of the 6 clinical characteristics of malnutrition:  Energy Intake:  Mild decrease in energy intake (Pt reports he is eating ok and has a neighboor that bbq's for him frequently. Pt reports some days to consume a little less than he should.)  Weight Loss:  No weight loss     Body Fat Loss:  No body fat loss Buccal region, Fat Overlying Ribs, Triceps, Orbital   Muscle Mass Loss:  No muscle mass loss Thigh (quadriceps), Clavicles (pectoralis & deltoids), Temples (temporalis), Calf (gastrocnemius), Hand (interosseous)  Fluid Accumulation:  No fluid accumulation Extremities, Generalized   Strength:  Not Performed    Nutrition Assessment:    Pt on LOS day 8 admitted with left hip facture after a fall. Pt has a pmh of CVA with left-sided residual deficits, seizure disorder, advanced dementia without behavioral disturbances and depression per H&P. Pt currenlty on a regular diet order with standard high calorie/high protein oral nutrition supplement offered at lunch. Pt wt appears stable upon chart review. Pt found to be at risk for malnutrition.    Nutrition Related Findings:    Pt appears small in stature with little muscle or fat but wt appears stable over the past year upon chart review/ and 'waisting' appears not to be present.RBC 2.20, Hemoglobin 7.2, lipitor,

## 2024-12-17 NOTE — CARE COORDINATION
Chart reviewed and patient discussed in IDR. Precert pending for ARU . CM following for determination.

## 2024-12-17 NOTE — PROGRESS NOTES
impact:  none  Barriers to improvement:  none  Plan for Next Session:    Plan to continue OOB activities.   Time in:  1139  Time out:  1204  Timed treatment minutes:  25  Total treatment time:  25    Previously filed items:  Social/Functional History  Lives With: Other (comment) (Brother)  Type of Home: House  Home Layout: One level (1 step into/out of rest of house from bedroom)  Home Access: Stairs to enter with rails  Entrance Stairs - Number of Steps: 3  Entrance Stairs - Rails: Both  Bathroom Shower/Tub: Tub/Shower unit  Bathroom Equipment: Grab bars in shower, Grab bars around toilet  Home Equipment: Cane, Walker - Standard  Prior Level of Assist for ADLs: Independent  Prior Level of Assist for Homemaking: Independent  Homemaking Responsibilities: Yes  Prior Level of Assist for Ambulation: Independent household ambulator, with or without device, Independent community ambulator, with or without device (does not use AD at [facility])  Prior Level of Assist for Transfers: Independent        Short Term Goals  Time Frame for Short Term Goals: 1 week  Short Term Goal 1: pt to complete all bed mobility SBA  Short Term Goal 2: pt to complete all STS transfers to/from bed, commode, and chair min A  Short Term Goal 3: pt to ambulate 25' with LRAD CGA    Electronically signed by:    Sonia Mckeon PTA  12/17/2024, 1:39 PM

## 2024-12-18 LAB
DATE, STOOL #1: NORMAL
HEMOCCULT SP1 STL QL: NEGATIVE
HEMOCCULT SP2 STL QL: NEGATIVE
TIME, STOOL #1: NORMAL

## 2024-12-18 PROCEDURE — 94150 VITAL CAPACITY TEST: CPT

## 2024-12-18 PROCEDURE — 6360000002 HC RX W HCPCS: Performed by: STUDENT IN AN ORGANIZED HEALTH CARE EDUCATION/TRAINING PROGRAM

## 2024-12-18 PROCEDURE — 97530 THERAPEUTIC ACTIVITIES: CPT

## 2024-12-18 PROCEDURE — 97116 GAIT TRAINING THERAPY: CPT

## 2024-12-18 PROCEDURE — 1200000000 HC SEMI PRIVATE

## 2024-12-18 PROCEDURE — 2500000003 HC RX 250 WO HCPCS: Performed by: STUDENT IN AN ORGANIZED HEALTH CARE EDUCATION/TRAINING PROGRAM

## 2024-12-18 PROCEDURE — 6370000000 HC RX 637 (ALT 250 FOR IP): Performed by: STUDENT IN AN ORGANIZED HEALTH CARE EDUCATION/TRAINING PROGRAM

## 2024-12-18 PROCEDURE — 94761 N-INVAS EAR/PLS OXIMETRY MLT: CPT

## 2024-12-18 PROCEDURE — 97110 THERAPEUTIC EXERCISES: CPT

## 2024-12-18 RX ADMIN — DONEPEZIL HYDROCHLORIDE 10 MG: 10 TABLET ORAL at 22:22

## 2024-12-18 RX ADMIN — Medication 1000 MCG: at 22:22

## 2024-12-18 RX ADMIN — OXYCODONE HYDROCHLORIDE 10 MG: 10 TABLET ORAL at 08:01

## 2024-12-18 RX ADMIN — SERTRALINE HYDROCHLORIDE 200 MG: 50 TABLET ORAL at 08:01

## 2024-12-18 RX ADMIN — ACETAMINOPHEN 650 MG: 325 TABLET ORAL at 22:23

## 2024-12-18 RX ADMIN — DIVALPROEX SODIUM 500 MG: 500 TABLET, DELAYED RELEASE ORAL at 18:50

## 2024-12-18 RX ADMIN — SENNOSIDES AND DOCUSATE SODIUM 1 TABLET: 50; 8.6 TABLET ORAL at 08:02

## 2024-12-18 RX ADMIN — OXYCODONE HYDROCHLORIDE 10 MG: 10 TABLET ORAL at 04:29

## 2024-12-18 RX ADMIN — MEMANTINE 10 MG: 10 TABLET ORAL at 08:02

## 2024-12-18 RX ADMIN — SODIUM CHLORIDE, PRESERVATIVE FREE 10 ML: 5 INJECTION INTRAVENOUS at 22:25

## 2024-12-18 RX ADMIN — ATORVASTATIN CALCIUM 40 MG: 40 TABLET, FILM COATED ORAL at 22:23

## 2024-12-18 RX ADMIN — FOLIC ACID 0.5 MG: 1 TABLET ORAL at 08:01

## 2024-12-18 RX ADMIN — Medication 1000 MCG: at 08:02

## 2024-12-18 RX ADMIN — ACETAMINOPHEN 650 MG: 325 TABLET ORAL at 04:29

## 2024-12-18 RX ADMIN — LEVETIRACETAM 1500 MG: 500 TABLET, FILM COATED ORAL at 22:23

## 2024-12-18 RX ADMIN — MEMANTINE 10 MG: 10 TABLET ORAL at 22:23

## 2024-12-18 RX ADMIN — LACOSAMIDE 200 MG: 100 TABLET, FILM COATED ORAL at 22:24

## 2024-12-18 RX ADMIN — Medication 100 MG: at 08:02

## 2024-12-18 RX ADMIN — DIVALPROEX SODIUM 500 MG: 500 TABLET, DELAYED RELEASE ORAL at 08:02

## 2024-12-18 RX ADMIN — ACETAMINOPHEN 650 MG: 325 TABLET ORAL at 14:15

## 2024-12-18 RX ADMIN — CALCIUM CARBONATE 500 MG: 500 TABLET, CHEWABLE ORAL at 08:01

## 2024-12-18 RX ADMIN — LEVETIRACETAM 1500 MG: 500 TABLET, FILM COATED ORAL at 08:02

## 2024-12-18 RX ADMIN — SODIUM CHLORIDE, PRESERVATIVE FREE 10 ML: 5 INJECTION INTRAVENOUS at 08:03

## 2024-12-18 RX ADMIN — LACOSAMIDE 200 MG: 100 TABLET, FILM COATED ORAL at 08:01

## 2024-12-18 RX ADMIN — MELATONIN TAB 3 MG 6 MG: 3 TAB at 22:24

## 2024-12-18 RX ADMIN — ENOXAPARIN SODIUM 30 MG: 100 INJECTION SUBCUTANEOUS at 18:50

## 2024-12-18 RX ADMIN — OXYCODONE HYDROCHLORIDE 10 MG: 10 TABLET ORAL at 22:36

## 2024-12-18 RX ADMIN — SENNOSIDES AND DOCUSATE SODIUM 1 TABLET: 50; 8.6 TABLET ORAL at 22:23

## 2024-12-18 ASSESSMENT — PAIN DESCRIPTION - ONSET: ONSET: ON-GOING

## 2024-12-18 ASSESSMENT — PAIN SCALES - GENERAL
PAINLEVEL_OUTOF10: 0
PAINLEVEL_OUTOF10: 0
PAINLEVEL_OUTOF10: 8
PAINLEVEL_OUTOF10: 6
PAINLEVEL_OUTOF10: 7
PAINLEVEL_OUTOF10: 0
PAINLEVEL_OUTOF10: 0
PAINLEVEL_OUTOF10: 4
PAINLEVEL_OUTOF10: 8
PAINLEVEL_OUTOF10: 0

## 2024-12-18 ASSESSMENT — PAIN DESCRIPTION - ORIENTATION
ORIENTATION: LEFT

## 2024-12-18 ASSESSMENT — PAIN DESCRIPTION - LOCATION
LOCATION: HIP

## 2024-12-18 ASSESSMENT — PAIN SCALES - WONG BAKER: WONGBAKER_NUMERICALRESPONSE: NO HURT

## 2024-12-18 ASSESSMENT — PAIN DESCRIPTION - DESCRIPTORS
DESCRIPTORS: STABBING
DESCRIPTORS: ACHING;DISCOMFORT
DESCRIPTORS: ACHING

## 2024-12-18 ASSESSMENT — PAIN - FUNCTIONAL ASSESSMENT
PAIN_FUNCTIONAL_ASSESSMENT: ACTIVITIES ARE NOT PREVENTED

## 2024-12-18 ASSESSMENT — PAIN DESCRIPTION - PAIN TYPE
TYPE: SURGICAL PAIN
TYPE: SURGICAL PAIN

## 2024-12-18 ASSESSMENT — PAIN DESCRIPTION - FREQUENCY: FREQUENCY: CONTINUOUS

## 2024-12-18 NOTE — PROGRESS NOTES
V2.0  [facility] Hospitalist Progress Note      Name:  Brent Degroot /Age/Sex: 1960  (64 y.o. male)   MRN & CSN:  [id number] & [id number] Encounter Date/Time: 2024 11:45 AM EST    Location:  [facility]-A PCP: Damon Esquivel MD       Hospital Day: 10    Assessment and Plan:   Brent Degroot is a 64 y.o. male with pmh as noted below who presents with Closed left hip fracture, initial encounter ([facility])      Plan:    Acute anemia, postop and dilutional  Patient with progressive drop in his hemoglobin from 11.9 on admission to 6.1 .  --He is status post transfusion of 1 unit, hemoglobin stable at 7.4 this a.m. Site is not showing any hematomas and thigh is not distended or taut.  --Trend H&H and transfuse for hemoglobin less than 7.  If it continues to trend down, will scan and obtain iron panel  --Iron panel ordered     Closed left hip fracture  Reported missing last step going down stairs leading to fall on left hip. No head trauma or LOC. On Plavix.   --NVI distally. XR showed intertrochanteric fracture of left femur without dislocation.  --ED provider discussed with Ortho, and patient is POD 6, appreciate Ortho recs.  Continue DVT prophylaxis with Lovenox, recommendations from Ortho is 4 weeks of 81 mg of ASA twice daily.  --PT OT recs appreciated and and ARU has accepted, pre-CERT is pending  --Analgesics as needed especially prior to therapy  -- Precert remains pending  -- ARU denied per Insurance. Appeal started.        Elevated blood pressure -resolved  Not on antihypertensive meds at home.   --Blood pressure is normotensive at this time, continue to monitor      Hx of CVA with left-sided residual deficits  --Continue statin, will hold Plavix at this time and monitor hemoglobin     Seizure disorder  --Continue Depakote, Vimpat and Keppra.       Advanced dementia without behavioral disturbances  A&Ox3, attentive.  --Continue Aricept and Naemenda with delirium precautions,

## 2024-12-18 NOTE — PLAN OF CARE
Problem: Chronic Conditions and Co-morbidities  Goal: Patient's chronic conditions and co-morbidity symptoms are monitored and maintained or improved  12/18/2024 1424 by Estephania Leyva RN  Outcome: Progressing  12/18/2024 0229 by Carissa Mitchell LPN  Outcome: Progressing     Problem: Discharge Planning  Goal: Discharge to home or other facility with appropriate resources  12/18/2024 1424 by Estephania Leyva RN  Outcome: Progressing  12/18/2024 0229 by Carissa Mitchell LPN  Outcome: Progressing     Problem: Pain  Goal: Verbalizes/displays adequate comfort level or baseline comfort level  12/18/2024 1424 by Estephania Leyva RN  Outcome: Progressing  12/18/2024 0229 by Carissa Mitchell LPN  Outcome: Progressing     Problem: Safety - Adult  Goal: Free from fall injury  12/18/2024 1424 by Estephania Leyva RN  Outcome: Progressing  12/18/2024 0229 by Carissa Mitchell LPN  Outcome: Progressing     Problem: ABCDS Injury Assessment  Goal: Absence of physical injury  12/18/2024 1424 by Estephania Leyva RN  Outcome: Progressing  12/18/2024 0229 by Carissa Mitchell LPN  Outcome: Progressing     Problem: Skin/Tissue Integrity  Goal: Absence of new skin breakdown  Description: 1.  Monitor for areas of redness and/or skin breakdown  2.  Assess vascular access sites hourly  3.  Every 4-6 hours minimum:  Change oxygen saturation probe site  4.  Every 4-6 hours:  If on nasal continuous positive airway pressure, respiratory therapy assess nares and determine need for appliance change or resting period.  12/18/2024 1424 by Estephania Leyva RN  Outcome: Progressing  12/18/2024 0229 by Carissa Mitchell LPN  Outcome: Progressing     Problem: Nutrition Deficit:  Goal: Optimize nutritional status  Outcome: Progressing

## 2024-12-18 NOTE — CARE COORDINATION
Received denial for admission to ARU.  Obtained signed appeal letter from attending.     Expedited appeal initiated and pending at this time with [employer].  Will follow for determination.

## 2024-12-18 NOTE — PROGRESS NOTES
Physical Therapy    Physical Therapy Treatment Note  Name: Brent Degroot MRN: [id number] :   1960   Date:  2024   Admission Date: 2024 Room:  [address] [place] [address]   Restrictions/Precautions:          WBAT LLE, fall risk  Communication with other providers:  nurse oks  Subjective:  Patient states:  agreeable   Pain:   Location, Type, Intensity (0/10 to 10/10):  does not rate, pain /c movement/wb  Objective:    Observation:  in chair  Treatment, including education/measures:  TherEx  Seated DF/PF x20  LAQ, marches, hip abd AAROM for stability/comfort  STS Padmini , SPT /c FWW CGA  Gait training on level surfaces x~30-40' x multiple sets /c stand rest breaks frequently. Amb on stairs CGA; very slow mia, discontinuous gait pattern, dec wb LLE, step length/ht  Pt has some weakness/discomfort and mild light headedness during amb and requiring quick retrieval of chair from staff for safe seated rest.   Safety  Patient left safely in the chair, with call light/phone in reach with alarm applied. Gait belt was used for transfers and gait. Reviewed HEP and supplied pt /c leg  for AAROM    Assessment / Impression:    Pt tolerates tx well, though weakness and fatigue combined /c stability issues create fall risk, which was reason for needing chair to sit during gait  Patient's tolerance of treatment:  good   Adverse Reaction: na  Significant change in status and impact:  na  Barriers to improvement:  weakness and endurance  Plan for Next Session:    Cont gait/balance progression                Time in:  1131  Time out:  1214  Timed treatment minutes:  43  Total treatment time:  43    Previously filed items:  Social/Functional History  Lives With: Other (comment) (Brother)  Type of Home: House  Home Layout: One level (1 step into/out of rest of house from bedroom)  Home Access: Stairs to enter with rails  Entrance Stairs - Number of Steps: 3  Entrance Stairs - Rails: Both  Bathroom Shower/Tub: Tub/Shower

## 2024-12-19 ENCOUNTER — HOSPITAL ENCOUNTER (INPATIENT)
Age: 64
LOS: 12 days | Discharge: HOME HEALTH | DRG: 560 | End: 2024-12-31
Attending: PHYSICAL MEDICINE & REHABILITATION | Admitting: PHYSICAL MEDICINE & REHABILITATION
Payer: MEDICARE

## 2024-12-19 VITALS
BODY MASS INDEX: 18.95 KG/M2 | RESPIRATION RATE: 18 BRPM | TEMPERATURE: 98.2 F | SYSTOLIC BLOOD PRESSURE: 132 MMHG | HEART RATE: 68 BPM | OXYGEN SATURATION: 91 % | DIASTOLIC BLOOD PRESSURE: 77 MMHG | HEIGHT: 62 IN | WEIGHT: 103 LBS

## 2024-12-19 DIAGNOSIS — S72.002A CLOSED LEFT HIP FRACTURE, INITIAL ENCOUNTER (HCC): Primary | ICD-10-CM

## 2024-12-19 PROBLEM — S72.142A CLOSED INTERTROCHANTERIC FRACTURE OF HIP, LEFT, INITIAL ENCOUNTER (HCC): Status: ACTIVE | Noted: 2024-12-19

## 2024-12-19 LAB
BASOPHILS # BLD: 0.05 K/UL
BASOPHILS NFR BLD: 1 % (ref 0–1)
EOSINOPHIL # BLD: 0.14 K/UL
EOSINOPHILS RELATIVE PERCENT: 2 % (ref 0–3)
ERYTHROCYTE [DISTWIDTH] IN BLOOD BY AUTOMATED COUNT: 15.7 % (ref 11.7–14.9)
HCT VFR BLD AUTO: 24.8 % (ref 42–52)
HGB BLD-MCNC: 7.6 G/DL (ref 13.5–18)
IMM GRANULOCYTES # BLD AUTO: 0.05 K/UL
IMM GRANULOCYTES NFR BLD: 1 %
LYMPHOCYTES NFR BLD: 1.95 K/UL
LYMPHOCYTES RELATIVE PERCENT: 29 % (ref 24–44)
MCH RBC QN AUTO: 32.3 PG (ref 27–31)
MCHC RBC AUTO-ENTMCNC: 30.6 G/DL (ref 32–36)
MCV RBC AUTO: 105.5 FL (ref 78–100)
MONOCYTES NFR BLD: 0.93 K/UL
MONOCYTES NFR BLD: 14 % (ref 0–4)
NEUTROPHILS NFR BLD: 55 % (ref 36–66)
NEUTS SEG NFR BLD: 3.73 K/UL
PLATELET # BLD AUTO: 369 K/UL (ref 140–440)
PMV BLD AUTO: 8.7 FL (ref 7.5–11.1)
RBC # BLD AUTO: 2.35 M/UL (ref 4.6–6.2)
WBC OTHER # BLD: 6.9 K/UL (ref 4–10.5)

## 2024-12-19 PROCEDURE — 97116 GAIT TRAINING THERAPY: CPT

## 2024-12-19 PROCEDURE — 1280000000 HC REHAB R&B

## 2024-12-19 PROCEDURE — 36415 COLL VENOUS BLD VENIPUNCTURE: CPT

## 2024-12-19 PROCEDURE — 99223 1ST HOSP IP/OBS HIGH 75: CPT | Performed by: PHYSICAL MEDICINE & REHABILITATION

## 2024-12-19 PROCEDURE — 2500000003 HC RX 250 WO HCPCS: Performed by: STUDENT IN AN ORGANIZED HEALTH CARE EDUCATION/TRAINING PROGRAM

## 2024-12-19 PROCEDURE — 6370000000 HC RX 637 (ALT 250 FOR IP): Performed by: NURSE PRACTITIONER

## 2024-12-19 PROCEDURE — 94761 N-INVAS EAR/PLS OXIMETRY MLT: CPT

## 2024-12-19 PROCEDURE — 6360000002 HC RX W HCPCS: Performed by: NURSE PRACTITIONER

## 2024-12-19 PROCEDURE — 85025 COMPLETE CBC W/AUTO DIFF WBC: CPT

## 2024-12-19 PROCEDURE — 94150 VITAL CAPACITY TEST: CPT

## 2024-12-19 PROCEDURE — 6370000000 HC RX 637 (ALT 250 FOR IP): Performed by: STUDENT IN AN ORGANIZED HEALTH CARE EDUCATION/TRAINING PROGRAM

## 2024-12-19 PROCEDURE — 97530 THERAPEUTIC ACTIVITIES: CPT

## 2024-12-19 RX ORDER — OXYCODONE HYDROCHLORIDE 5 MG/1
5 TABLET ORAL EVERY 4 HOURS PRN
Status: CANCELLED | OUTPATIENT
Start: 2024-12-19

## 2024-12-19 RX ORDER — SENNA AND DOCUSATE SODIUM 50; 8.6 MG/1; MG/1
1 TABLET, FILM COATED ORAL 2 TIMES DAILY
Status: CANCELLED | OUTPATIENT
Start: 2024-12-19

## 2024-12-19 RX ORDER — ONDANSETRON 4 MG/1
4 TABLET, ORALLY DISINTEGRATING ORAL EVERY 8 HOURS PRN
Status: CANCELLED | OUTPATIENT
Start: 2024-12-19

## 2024-12-19 RX ORDER — ONDANSETRON 2 MG/ML
4 INJECTION INTRAMUSCULAR; INTRAVENOUS EVERY 6 HOURS PRN
Status: CANCELLED | OUTPATIENT
Start: 2024-12-19

## 2024-12-19 RX ORDER — ENOXAPARIN SODIUM 100 MG/ML
30 INJECTION SUBCUTANEOUS EVERY EVENING
Status: CANCELLED | OUTPATIENT
Start: 2024-12-19

## 2024-12-19 RX ORDER — LANOLIN ALCOHOL/MO/W.PET/CERES
100 CREAM (GRAM) TOPICAL DAILY
Status: CANCELLED | OUTPATIENT
Start: 2024-12-20

## 2024-12-19 RX ORDER — ACETAMINOPHEN 325 MG/1
650 TABLET ORAL EVERY 6 HOURS SCHEDULED
Status: DISCONTINUED | OUTPATIENT
Start: 2024-12-19 | End: 2024-12-31 | Stop reason: HOSPADM

## 2024-12-19 RX ORDER — OXYCODONE HYDROCHLORIDE 10 MG/1
10 TABLET ORAL EVERY 4 HOURS PRN
Status: CANCELLED | OUTPATIENT
Start: 2024-12-19

## 2024-12-19 RX ORDER — CLOPIDOGREL BISULFATE 75 MG/1
75 TABLET ORAL DAILY
Status: CANCELLED | OUTPATIENT
Start: 2024-12-20

## 2024-12-19 RX ORDER — ATORVASTATIN CALCIUM 40 MG/1
40 TABLET, FILM COATED ORAL NIGHTLY
Status: CANCELLED | OUTPATIENT
Start: 2024-12-19

## 2024-12-19 RX ORDER — DIVALPROEX SODIUM 500 MG/1
500 TABLET, DELAYED RELEASE ORAL 2 TIMES DAILY
Status: CANCELLED | OUTPATIENT
Start: 2024-12-19

## 2024-12-19 RX ORDER — LANOLIN ALCOHOL/MO/W.PET/CERES
100 CREAM (GRAM) TOPICAL DAILY
Status: DISCONTINUED | OUTPATIENT
Start: 2024-12-20 | End: 2024-12-31 | Stop reason: HOSPADM

## 2024-12-19 RX ORDER — ONDANSETRON 2 MG/ML
4 INJECTION INTRAMUSCULAR; INTRAVENOUS EVERY 6 HOURS PRN
Status: DISCONTINUED | OUTPATIENT
Start: 2024-12-19 | End: 2024-12-30

## 2024-12-19 RX ORDER — ATORVASTATIN CALCIUM 40 MG/1
40 TABLET, FILM COATED ORAL NIGHTLY
Status: DISCONTINUED | OUTPATIENT
Start: 2024-12-19 | End: 2024-12-31 | Stop reason: HOSPADM

## 2024-12-19 RX ORDER — BISACODYL 10 MG
10 SUPPOSITORY, RECTAL RECTAL DAILY PRN
Status: CANCELLED | OUTPATIENT
Start: 2024-12-19

## 2024-12-19 RX ORDER — FAMOTIDINE 20 MG/1
20 TABLET, FILM COATED ORAL 2 TIMES DAILY PRN
Status: CANCELLED | OUTPATIENT
Start: 2024-12-19

## 2024-12-19 RX ORDER — ENOXAPARIN SODIUM 100 MG/ML
30 INJECTION SUBCUTANEOUS EVERY EVENING
Status: DISCONTINUED | OUTPATIENT
Start: 2024-12-19 | End: 2024-12-30

## 2024-12-19 RX ORDER — LEVETIRACETAM 500 MG/1
1500 TABLET ORAL 2 TIMES DAILY
Status: CANCELLED | OUTPATIENT
Start: 2024-12-19

## 2024-12-19 RX ORDER — FOLIC ACID 1 MG/1
0.5 TABLET ORAL DAILY
Status: DISCONTINUED | OUTPATIENT
Start: 2024-12-20 | End: 2024-12-31 | Stop reason: HOSPADM

## 2024-12-19 RX ORDER — MEMANTINE HYDROCHLORIDE 10 MG/1
10 TABLET ORAL 2 TIMES DAILY
Status: DISCONTINUED | OUTPATIENT
Start: 2024-12-19 | End: 2024-12-31 | Stop reason: HOSPADM

## 2024-12-19 RX ORDER — LACOSAMIDE 100 MG/1
200 TABLET ORAL 2 TIMES DAILY
Status: CANCELLED | OUTPATIENT
Start: 2024-12-19

## 2024-12-19 RX ORDER — ACETAMINOPHEN 325 MG/1
650 TABLET ORAL EVERY 6 HOURS SCHEDULED
Status: CANCELLED | OUTPATIENT
Start: 2024-12-19

## 2024-12-19 RX ORDER — OXYCODONE HYDROCHLORIDE 10 MG/1
10 TABLET ORAL EVERY 4 HOURS PRN
Status: DISCONTINUED | OUTPATIENT
Start: 2024-12-19 | End: 2024-12-30

## 2024-12-19 RX ORDER — FAMOTIDINE 20 MG/1
20 TABLET, FILM COATED ORAL 2 TIMES DAILY PRN
Status: DISCONTINUED | OUTPATIENT
Start: 2024-12-19 | End: 2024-12-19 | Stop reason: HOSPADM

## 2024-12-19 RX ORDER — MEMANTINE HYDROCHLORIDE 10 MG/1
10 TABLET ORAL 2 TIMES DAILY
Status: CANCELLED | OUTPATIENT
Start: 2024-12-19

## 2024-12-19 RX ORDER — CALCIUM CARBONATE 500 MG/1
500 TABLET, CHEWABLE ORAL 3 TIMES DAILY PRN
Status: CANCELLED | OUTPATIENT
Start: 2024-12-19

## 2024-12-19 RX ORDER — DONEPEZIL HYDROCHLORIDE 10 MG/1
10 TABLET, FILM COATED ORAL NIGHTLY
Status: DISCONTINUED | OUTPATIENT
Start: 2024-12-19 | End: 2024-12-31 | Stop reason: HOSPADM

## 2024-12-19 RX ORDER — LANOLIN ALCOHOL/MO/W.PET/CERES
1000 CREAM (GRAM) TOPICAL 2 TIMES DAILY
Status: DISCONTINUED | OUTPATIENT
Start: 2024-12-19 | End: 2024-12-31 | Stop reason: HOSPADM

## 2024-12-19 RX ORDER — ONDANSETRON 4 MG/1
4 TABLET, ORALLY DISINTEGRATING ORAL EVERY 8 HOURS PRN
Status: DISCONTINUED | OUTPATIENT
Start: 2024-12-19 | End: 2024-12-31 | Stop reason: HOSPADM

## 2024-12-19 RX ORDER — FOLIC ACID 1 MG/1
0.5 TABLET ORAL DAILY
Status: CANCELLED | OUTPATIENT
Start: 2024-12-20

## 2024-12-19 RX ORDER — OXYCODONE HYDROCHLORIDE 5 MG/1
5 TABLET ORAL EVERY 4 HOURS PRN
Status: DISCONTINUED | OUTPATIENT
Start: 2024-12-19 | End: 2024-12-30

## 2024-12-19 RX ORDER — BISACODYL 10 MG
10 SUPPOSITORY, RECTAL RECTAL DAILY PRN
Status: DISCONTINUED | OUTPATIENT
Start: 2024-12-19 | End: 2024-12-31 | Stop reason: HOSPADM

## 2024-12-19 RX ORDER — CLOPIDOGREL BISULFATE 75 MG/1
75 TABLET ORAL DAILY
Status: DISCONTINUED | OUTPATIENT
Start: 2024-12-20 | End: 2024-12-31 | Stop reason: HOSPADM

## 2024-12-19 RX ORDER — LEVETIRACETAM 500 MG/1
1500 TABLET ORAL 2 TIMES DAILY
Status: DISCONTINUED | OUTPATIENT
Start: 2024-12-19 | End: 2024-12-31 | Stop reason: HOSPADM

## 2024-12-19 RX ORDER — LANOLIN ALCOHOL/MO/W.PET/CERES
1000 CREAM (GRAM) TOPICAL 2 TIMES DAILY
Status: CANCELLED | OUTPATIENT
Start: 2024-12-19

## 2024-12-19 RX ORDER — POLYETHYLENE GLYCOL 3350 17 G/17G
17 POWDER, FOR SOLUTION ORAL DAILY PRN
Status: DISCONTINUED | OUTPATIENT
Start: 2024-12-19 | End: 2024-12-31 | Stop reason: HOSPADM

## 2024-12-19 RX ORDER — SENNA AND DOCUSATE SODIUM 50; 8.6 MG/1; MG/1
1 TABLET, FILM COATED ORAL 2 TIMES DAILY
Status: DISCONTINUED | OUTPATIENT
Start: 2024-12-19 | End: 2024-12-31 | Stop reason: HOSPADM

## 2024-12-19 RX ORDER — DONEPEZIL HYDROCHLORIDE 10 MG/1
10 TABLET, FILM COATED ORAL NIGHTLY
Status: CANCELLED | OUTPATIENT
Start: 2024-12-19

## 2024-12-19 RX ORDER — CALCIUM CARBONATE 500 MG/1
500 TABLET, CHEWABLE ORAL 3 TIMES DAILY PRN
Status: DISCONTINUED | OUTPATIENT
Start: 2024-12-19 | End: 2024-12-31 | Stop reason: HOSPADM

## 2024-12-19 RX ORDER — POLYETHYLENE GLYCOL 3350 17 G/17G
17 POWDER, FOR SOLUTION ORAL DAILY PRN
Status: CANCELLED | OUTPATIENT
Start: 2024-12-19

## 2024-12-19 RX ORDER — LACOSAMIDE 100 MG/1
200 TABLET ORAL 2 TIMES DAILY
Status: DISCONTINUED | OUTPATIENT
Start: 2024-12-19 | End: 2024-12-31 | Stop reason: HOSPADM

## 2024-12-19 RX ORDER — DIVALPROEX SODIUM 500 MG/1
500 TABLET, DELAYED RELEASE ORAL 2 TIMES DAILY
Status: DISCONTINUED | OUTPATIENT
Start: 2024-12-19 | End: 2024-12-31 | Stop reason: HOSPADM

## 2024-12-19 RX ORDER — FAMOTIDINE 20 MG/1
20 TABLET, FILM COATED ORAL 2 TIMES DAILY PRN
Status: DISCONTINUED | OUTPATIENT
Start: 2024-12-19 | End: 2024-12-20

## 2024-12-19 RX ADMIN — DONEPEZIL HYDROCHLORIDE 10 MG: 10 TABLET ORAL at 20:21

## 2024-12-19 RX ADMIN — ENOXAPARIN SODIUM 30 MG: 100 INJECTION SUBCUTANEOUS at 20:22

## 2024-12-19 RX ADMIN — LEVETIRACETAM 1500 MG: 500 TABLET, FILM COATED ORAL at 09:14

## 2024-12-19 RX ADMIN — MELATONIN TAB 3 MG 6 MG: 3 TAB at 20:21

## 2024-12-19 RX ADMIN — Medication 100 MG: at 09:19

## 2024-12-19 RX ADMIN — MEMANTINE 10 MG: 10 TABLET ORAL at 20:21

## 2024-12-19 RX ADMIN — SENNOSIDES AND DOCUSATE SODIUM 1 TABLET: 50; 8.6 TABLET ORAL at 20:21

## 2024-12-19 RX ADMIN — DIVALPROEX SODIUM 500 MG: 500 TABLET, DELAYED RELEASE ORAL at 09:19

## 2024-12-19 RX ADMIN — LEVETIRACETAM 1500 MG: 500 TABLET, FILM COATED ORAL at 20:21

## 2024-12-19 RX ADMIN — MEMANTINE 10 MG: 10 TABLET ORAL at 09:20

## 2024-12-19 RX ADMIN — LACOSAMIDE 200 MG: 100 TABLET, FILM COATED ORAL at 09:14

## 2024-12-19 RX ADMIN — ATORVASTATIN CALCIUM 40 MG: 40 TABLET, FILM COATED ORAL at 20:21

## 2024-12-19 RX ADMIN — FOLIC ACID 0.5 MG: 1 TABLET ORAL at 09:15

## 2024-12-19 RX ADMIN — ACETAMINOPHEN 650 MG: 325 TABLET ORAL at 09:14

## 2024-12-19 RX ADMIN — Medication 1000 MCG: at 09:15

## 2024-12-19 RX ADMIN — SENNOSIDES AND DOCUSATE SODIUM 1 TABLET: 50; 8.6 TABLET ORAL at 09:15

## 2024-12-19 RX ADMIN — OXYCODONE HYDROCHLORIDE 10 MG: 10 TABLET ORAL at 20:21

## 2024-12-19 RX ADMIN — ACETAMINOPHEN 650 MG: 325 TABLET ORAL at 20:21

## 2024-12-19 RX ADMIN — Medication 1000 MCG: at 20:21

## 2024-12-19 RX ADMIN — DIVALPROEX SODIUM 500 MG: 500 TABLET, DELAYED RELEASE ORAL at 20:21

## 2024-12-19 RX ADMIN — LACOSAMIDE 200 MG: 100 TABLET, FILM COATED ORAL at 20:21

## 2024-12-19 RX ADMIN — SODIUM CHLORIDE, PRESERVATIVE FREE 10 ML: 5 INJECTION INTRAVENOUS at 09:20

## 2024-12-19 RX ADMIN — SERTRALINE HYDROCHLORIDE 200 MG: 50 TABLET ORAL at 09:14

## 2024-12-19 ASSESSMENT — PAIN DESCRIPTION - LOCATION: LOCATION: HIP

## 2024-12-19 ASSESSMENT — PAIN DESCRIPTION - ORIENTATION: ORIENTATION: LEFT

## 2024-12-19 ASSESSMENT — PAIN SCALES - GENERAL: PAINLEVEL_OUTOF10: 8

## 2024-12-19 ASSESSMENT — PAIN DESCRIPTION - DESCRIPTORS: DESCRIPTORS: ACHING;DISCOMFORT

## 2024-12-19 ASSESSMENT — PAIN - FUNCTIONAL ASSESSMENT: PAIN_FUNCTIONAL_ASSESSMENT: ACTIVITIES ARE NOT PREVENTED

## 2024-12-19 ASSESSMENT — PAIN DESCRIPTION - PAIN TYPE: TYPE: SURGICAL PAIN

## 2024-12-19 NOTE — DISCHARGE SUMMARY
10/17/2018 09:05 AM    MUCUS RARE 07/24/2018 09:24 PM    TRICHOMONAS NONE SEEN 10/17/2018 09:05 AM    BACTERIA NEGATIVE 10/17/2018 09:05 AM    CLARITYU CLEAR 10/17/2018 09:05 AM    LEUKOCYTESUR NEGATIVE 12/15/2024 02:55 PM    UROBILINOGEN 2.0 12/15/2024 02:55 PM    BILIRUBINUR NEGATIVE 12/15/2024 02:55 PM    BLOODU NEGATIVE 10/17/2018 09:05 AM    GLUCOSEU NEGATIVE 12/15/2024 02:55 PM    KETUA TRACE 12/15/2024 02:55 PM     Urine Cultures: No results found for: \"LABURIN\"  Blood Cultures: No results found for: \"BC\"  No results found for: \"BLOODCULT2\"  Organism: No results found for: \"ORG\"    Time Spent Discharging patient 35 minutes    Electronically signed by MG BARBOSA CNP on 12/19/2024 at 1:16 PM

## 2024-12-19 NOTE — PROGRESS NOTES
ambulator, with or without device, Independent community ambulator, with or without device (does not use AD at [facility])  Prior Level of Assist for Transfers: Independent        Short Term Goals  Time Frame for Short Term Goals: 1 week  Short Term Goal 1: pt to complete all bed mobility SBA  Short Term Goal 2: pt to complete all STS transfers to/from bed, commode, and chair min A  Short Term Goal 3: pt to ambulate 25' with LRAD CGA    Electronically signed by:    Luke Simmons, PTA  12/19/2024, 12:17 PM

## 2024-12-19 NOTE — PLAN OF CARE
Problem: Chronic Conditions and Co-morbidities  Goal: Patient's chronic conditions and co-morbidity symptoms are monitored and maintained or improved  12/19/2024 0300 by Vanessa Ryan RN  Outcome: Progressing  12/18/2024 1424 by Estephania Leyva RN  Outcome: Progressing     Problem: Discharge Planning  Goal: Discharge to home or other facility with appropriate resources  12/19/2024 0300 by Vanessa Ryan RN  Outcome: Progressing  12/18/2024 1424 by Estephania Leyva RN  Outcome: Progressing     Problem: Pain  Goal: Verbalizes/displays adequate comfort level or baseline comfort level  12/19/2024 0300 by Vanessa Ryan RN  Outcome: Progressing  12/18/2024 1424 by Estephania Leyva RN  Outcome: Progressing     Problem: Safety - Adult  Goal: Free from fall injury  12/19/2024 0300 by Vanessa Ryan RN  Outcome: Progressing  12/18/2024 1424 by Estephania Leyva RN  Outcome: Progressing     Problem: ABCDS Injury Assessment  Goal: Absence of physical injury  12/19/2024 0300 by Vanessa Ryan RN  Outcome: Progressing  12/18/2024 1424 by Estephania Leyva RN  Outcome: Progressing     Problem: Skin/Tissue Integrity  Goal: Absence of new skin breakdown  Description: 1.  Monitor for areas of redness and/or skin breakdown  2.  Assess vascular access sites hourly  3.  Every 4-6 hours minimum:  Change oxygen saturation probe site  4.  Every 4-6 hours:  If on nasal continuous positive airway pressure, respiratory therapy assess nares and determine need for appliance change or resting period.  12/19/2024 0300 by Vanessa Ryan RN  Outcome: Progressing  12/18/2024 1424 by Estephania Leyva RN  Outcome: Progressing     Problem: Nutrition Deficit:  Goal: Optimize nutritional status  12/19/2024 0300 by Vanessa Ryan RN  Outcome: Progressing  12/18/2024 1424 by Estephania Leyva RN  Outcome: Progressing

## 2024-12-19 NOTE — FLOWSHEET NOTE
family down   [] 0.  No  [] 1.  Yes  [] 9. No Response [] 0.  Never or one day  [] 1.  2-6 days (several days)  [] 2.  7-11 days (half or more of days)  [] 3.  12-14 days (nearly every day   Trouble concentrating on things, such as reading the newspaper or watching television   [] 0.  No  [] 1.  Yes  [] 9. No Response [] 0.  Never or one day  [] 1.  2-6 days (several days)  [] 2.  7-11 days (half or more of days)  [] 3.  12-14 days (nearly every day   Moving or speaking so slowly that other people could have noticed.  Or the opposite- being so fidgety or restless that you have been moving around a lot more than usual.   [] 0.  No  [] 1.  Yes  [] 9. No Response [] 0.  Never or one day  [] 1.  2-6 days (several days)  [] 2.  7-11 days (half or more of days)  [] 3.  12-14 days (nearly every day   Thoughts that you would be better off dead, or of hurting yourself in some way.   [] 0.  No  [] 1.  Yes  [] 9. No Response [] 0.  Never or one day  [] 1.  2-6 days (several days)  [] 2.  7-11 days (half or more of days)  [] 3.  12-14 days (nearly every day     Social Isolation  \"How often do you feel lonely or isolated from those around you?\"  [x] 0.  Never  [] 1.  Rarely  [] 2.  Sometimes  [] 3.  Often  [] 4.  Always  [] 8.  Patient unable to respond    Pain Effect on Sleep  \"Over the past 5 days, how much of the time has pain made it hard for you to sleep at night?\"  []  0.  Does not apply - I have not had any pain or hurting in the past 5 days  []  1.  Rarely or not at all  []  2.  Occasionally  [x]  3.  Frequently  []  4.  Almost constantly  []  8.  Unable to answer  **If the patient answers \"0.  Does not apply\" to this question, skip the next two \"Pain\" questions**      Pain Interference with Therapy Activities  \"Over the past 5 days, how often have you limited your participation in rehabilitation therapy sessions due to pain?\"  []  0.  Does not apply - I have not received rehabilitation therapy in the past 5 days  [x]   1.  Rarely or not at all  []  2.  Occasionally  []  3.  Frequently  []  4.  Almost constantly  []  8.  Unable to answer    Pain Interference with Day-to-Day Activities:  \"Over the past 5 days, how often have you limited your day-to-day activities (excluding rehabilitation therapy session)?\"  [x]  1.  Rarely or not at all  []  2.  Occasionally  []  3.  Frequently  []  4.  Almost constantly  []  8.  Unable to answer                                                   Confirm you completed the new COVID Vaccination question at the top of this document before signing

## 2024-12-19 NOTE — PLAN OF CARE
protection and bowel support.  Caregiver training with his brother may be useful before the transition home.  Outpatient follow-up with orthopedics after rehab.  DVT prophylaxis: Continuing Lovenox 30 mg subcu nightly.  This raises his risk for spontaneous hemorrhage and GI bleeding.  However, his limited mobility justifies its use.  We must monitor his hemoglobin and platelet count regularly while considering GI prophylaxis with an H2 blocker.  Weightbearing will be limited but attempted each day as part of his therapy plan.  Seizure disorder: Continuing his Keppra, Vimpat and Depakote as he takes at home.  Treating him in a calm and consistent environment when possible.  Minimizing exhaustion.  Monitoring him for breakthrough seizures or mental status changes.  Uncontrolled pain: I will schedule acetaminophen 4 times daily.  Providing oxycodone for breakthrough pain on an as needed basis.  Cryotherapy and limb elevation strategies are offered.  Up titration of bowel intervention while on the analgesics.  Vascular dementia: Continuing Namenda and Aricept.  Treating him in a calm and consistent environment.  Providing written and verbal instructions when possible.  Involving his brother in the rehab process as much as possible.  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  Monitoring his blood pressure.  Dysthymia: His Depakote may be helping with this issue.  Continuing Zoloft at high dose.  Cautious modification of sleep patterns with melatonin.  Continue B12, folic acid and B6 supplements.     Anticipated discharge destination:    [] Home Independently   [x] Home with supervision    []SNF     [] Other       This plan has been reviewed with me in a language I understand. I have had the opportunity to include my input with my therapy team.    ________________________________________________   ______________________  Patient/Significant  Other      Date    I have reviewed this initial plan of care and agree with its contents:    Title   Name    Date    Time    Physician:   JUAN Snyder MD 12/21/2024 8:57 AM    Case Mgmt: Suzan OVALLE 12/20/2024 9:18 AM     OT: Lory Valencia MS, OTR/L 12/20/2024 1257    PT:Suzan Mtaa, PT 12/20/24, 1613    RN: Sienna Elkins RN    ST:    Dietician: Lenore Ignacio RD, LD  12/20/2024  15:41    ADMIT DATE:12/19/2024

## 2024-12-19 NOTE — CARE COORDINATION
Appeal completed and patient is now approved for ARU.  Auth#  [id number].      Updates needed on 12/23.      Notified patient and called POA regarding ARU approval.  Patient brother/POA (Isac) states patient has clothes in his room, but will bring more in for patient.    Patient meets criteria and is approved to come to ARU.   Patient able to admit once medically stable and after ARU Medical Director and  sign the pre-admission screen (PAS).    Bed available today.  Notified attending and CM of approval.

## 2024-12-19 NOTE — PROGRESS NOTES
Physical Therapy    Am att, pt states he would like to wait d/t migraine.    Electronically signed by:    Luke Simmons, PTA  12/19/2024, 11:40 AM

## 2024-12-19 NOTE — FLOWSHEET NOTE
4 Eyes Skin Assessment     NAME:  Brent Degroot  YOB: 1960  MEDICAL RECORD NUMBER:  [id number]    The patient is being assessed for  Admission    I agree that at least one RN has performed a thorough Head to Toe Skin Assessment on the patient. ALL assessment sites listed below have been assessed.      Areas assessed by both nurses:    Head, Face, Ears, Shoulders, Back, Chest, Arms, Elbows, Hands, Sacrum. Buttock, Coccyx, Ischium, Legs. Feet and Heels, and Under Medical Devices         Does the Patient have a Wound? No noted wound(s)       Brady Prevention initiated by RN: No  Wound Care Orders initiated by RN: No    Pressure Injury (Stage 3,4, Unstageable, DTI, NWPT, and Complex wounds) if present, place Wound referral order by RN under : No    New Ostomies, if present place, Ostomy referral order under : No     Nurse 1 eSignature: Electronically signed by Kathy Dalal RN on 12/19/24 at 5:19 PM EST    **SHARE this note so that the co-signing nurse can place an eSignature**    Nurse 2 eSignature: Electronically signed by Sienna Elkins RN on 12/19/24 at 6:26 PM EST

## 2024-12-19 NOTE — CARE COORDINATION
Chart reviewed and patient discussed in IDR. Expedited appeal pending for ARU. CM following for determination.

## 2024-12-20 PROBLEM — I69.398 HEMIPARESIS AND OTHER LATE EFFECTS OF CEREBROVASCULAR ACCIDENT (HCC): Status: ACTIVE | Noted: 2024-12-20

## 2024-12-20 PROBLEM — I69.359 HEMIPARESIS AND OTHER LATE EFFECTS OF CEREBROVASCULAR ACCIDENT (HCC): Status: ACTIVE | Noted: 2024-12-20

## 2024-12-20 PROBLEM — F34.1 DYSTHYMIA: Status: ACTIVE | Noted: 2024-12-20

## 2024-12-20 PROBLEM — F01.A4 MILD VASCULAR DEMENTIA WITH ANXIETY (HCC): Status: ACTIVE | Noted: 2024-12-20

## 2024-12-20 PROBLEM — R52 UNCONTROLLED PAIN: Status: ACTIVE | Noted: 2024-12-20

## 2024-12-20 PROBLEM — G40.909 SEIZURE DISORDER (HCC): Status: ACTIVE | Noted: 2018-10-16

## 2024-12-20 PROBLEM — D62 ACUTE BLOOD LOSS ANEMIA: Status: ACTIVE | Noted: 2024-12-20

## 2024-12-20 PROCEDURE — 97530 THERAPEUTIC ACTIVITIES: CPT

## 2024-12-20 PROCEDURE — 97535 SELF CARE MNGMENT TRAINING: CPT

## 2024-12-20 PROCEDURE — 94761 N-INVAS EAR/PLS OXIMETRY MLT: CPT

## 2024-12-20 PROCEDURE — 94150 VITAL CAPACITY TEST: CPT

## 2024-12-20 PROCEDURE — 97116 GAIT TRAINING THERAPY: CPT

## 2024-12-20 PROCEDURE — 99232 SBSQ HOSP IP/OBS MODERATE 35: CPT | Performed by: PHYSICAL MEDICINE & REHABILITATION

## 2024-12-20 PROCEDURE — 6370000000 HC RX 637 (ALT 250 FOR IP): Performed by: PHYSICAL MEDICINE & REHABILITATION

## 2024-12-20 PROCEDURE — 6370000000 HC RX 637 (ALT 250 FOR IP): Performed by: NURSE PRACTITIONER

## 2024-12-20 PROCEDURE — 97162 PT EVAL MOD COMPLEX 30 MIN: CPT

## 2024-12-20 PROCEDURE — 6360000002 HC RX W HCPCS: Performed by: NURSE PRACTITIONER

## 2024-12-20 PROCEDURE — 1280000000 HC REHAB R&B

## 2024-12-20 PROCEDURE — 97167 OT EVAL HIGH COMPLEX 60 MIN: CPT

## 2024-12-20 RX ORDER — FAMOTIDINE 20 MG/1
20 TABLET, FILM COATED ORAL 2 TIMES DAILY
Status: DISCONTINUED | OUTPATIENT
Start: 2024-12-20 | End: 2024-12-31 | Stop reason: HOSPADM

## 2024-12-20 RX ADMIN — MEMANTINE 10 MG: 10 TABLET ORAL at 20:17

## 2024-12-20 RX ADMIN — LACOSAMIDE 200 MG: 100 TABLET, FILM COATED ORAL at 20:16

## 2024-12-20 RX ADMIN — Medication 1000 MCG: at 09:27

## 2024-12-20 RX ADMIN — ACETAMINOPHEN 650 MG: 325 TABLET ORAL at 12:41

## 2024-12-20 RX ADMIN — DONEPEZIL HYDROCHLORIDE 10 MG: 10 TABLET ORAL at 20:16

## 2024-12-20 RX ADMIN — SERTRALINE HYDROCHLORIDE 200 MG: 50 TABLET ORAL at 09:26

## 2024-12-20 RX ADMIN — OXYCODONE HYDROCHLORIDE 10 MG: 10 TABLET ORAL at 20:25

## 2024-12-20 RX ADMIN — FAMOTIDINE 20 MG: 20 TABLET ORAL at 12:42

## 2024-12-20 RX ADMIN — SENNOSIDES AND DOCUSATE SODIUM 1 TABLET: 50; 8.6 TABLET ORAL at 09:26

## 2024-12-20 RX ADMIN — LACOSAMIDE 200 MG: 100 TABLET, FILM COATED ORAL at 09:27

## 2024-12-20 RX ADMIN — LEVETIRACETAM 1500 MG: 500 TABLET, FILM COATED ORAL at 09:26

## 2024-12-20 RX ADMIN — FOLIC ACID 0.5 MG: 1 TABLET ORAL at 09:27

## 2024-12-20 RX ADMIN — ACETAMINOPHEN 650 MG: 325 TABLET ORAL at 09:27

## 2024-12-20 RX ADMIN — Medication 100 MG: at 09:30

## 2024-12-20 RX ADMIN — ACETAMINOPHEN 650 MG: 325 TABLET ORAL at 20:15

## 2024-12-20 RX ADMIN — CLOPIDOGREL BISULFATE 75 MG: 75 TABLET ORAL at 09:28

## 2024-12-20 RX ADMIN — OXYCODONE HYDROCHLORIDE 10 MG: 10 TABLET ORAL at 14:05

## 2024-12-20 RX ADMIN — DIVALPROEX SODIUM 500 MG: 500 TABLET, DELAYED RELEASE ORAL at 18:43

## 2024-12-20 RX ADMIN — DIVALPROEX SODIUM 500 MG: 500 TABLET, DELAYED RELEASE ORAL at 09:30

## 2024-12-20 RX ADMIN — SENNOSIDES AND DOCUSATE SODIUM 1 TABLET: 50; 8.6 TABLET ORAL at 20:16

## 2024-12-20 RX ADMIN — LEVETIRACETAM 1500 MG: 500 TABLET, FILM COATED ORAL at 20:17

## 2024-12-20 RX ADMIN — MELATONIN TAB 3 MG 6 MG: 3 TAB at 20:16

## 2024-12-20 RX ADMIN — FAMOTIDINE 20 MG: 20 TABLET ORAL at 20:17

## 2024-12-20 RX ADMIN — ATORVASTATIN CALCIUM 40 MG: 40 TABLET, FILM COATED ORAL at 20:17

## 2024-12-20 RX ADMIN — Medication 1000 MCG: at 20:17

## 2024-12-20 RX ADMIN — MEMANTINE 10 MG: 10 TABLET ORAL at 09:27

## 2024-12-20 RX ADMIN — ENOXAPARIN SODIUM 30 MG: 100 INJECTION SUBCUTANEOUS at 17:36

## 2024-12-20 RX ADMIN — OXYCODONE HYDROCHLORIDE 10 MG: 10 TABLET ORAL at 09:30

## 2024-12-20 ASSESSMENT — PAIN DESCRIPTION - LOCATION
LOCATION: HIP

## 2024-12-20 ASSESSMENT — PAIN - FUNCTIONAL ASSESSMENT
PAIN_FUNCTIONAL_ASSESSMENT: ACTIVITIES ARE NOT PREVENTED

## 2024-12-20 ASSESSMENT — PAIN SCALES - GENERAL
PAINLEVEL_OUTOF10: 10
PAINLEVEL_OUTOF10: 8
PAINLEVEL_OUTOF10: 3
PAINLEVEL_OUTOF10: 10

## 2024-12-20 ASSESSMENT — PAIN DESCRIPTION - DESCRIPTORS
DESCRIPTORS: ACHING

## 2024-12-20 ASSESSMENT — PAIN DESCRIPTION - ORIENTATION
ORIENTATION: LEFT

## 2024-12-20 NOTE — H&P
Brent Degroot    : 1960  [facility] #: [id number]  MRN: [id number]              History and physical    Face-to-face exam: 2024.  Time of face-to-face exam: 1715.    Admitting diagnosis: Left intertrochanteric hip fracture (IGC 8.11)    Comorbid diagnoses impacting rehabilitation: Uncontrolled pain, generalized weakness, gait disturbance, acute blood loss anemia, seizure disorder, vascular dementia, dysthymia, history of CVA with chronic left hemiparesis.    Chief complaint: Left leg pain.    History of present illness: The patient is a 64-year-old right-hand-dominant male with chronic clumsiness with his seizure disorder and past stroke with ataxia and left hemiparesis.  On 2024 he was descending stairs at home when he \"missed the last step\" and fell to the ground landing hard onto his left side.  He denied striking his head, losing consciousness or experiencing palpitations over dizziness prior to the injury.  He was unable to stand by himself due to leg pain.  He was urgently brought to our ED where a minimally displaced left intertrochanteric hip fracture was identified. The next day Dr. Jones performed a left hip intramedullary nail fixation of the fracture.  He has allowed weightbearing as tolerated but the patient tolerates it poorly.  His uncontrolled pain, chronic left-sided weakness and ataxia have made his recovery quite complicated.  He has not had any perioperative seizure activity, fever or other evidence of infection.  Unfortunately, he has become unable to do his own toileting, transfers and self-care and cannot return directly home.  The acute care therapist have identified reasonable rehabilitation goals.  He requires inpatient rehabilitation to address these issues.    Review of systems: Clumsiness of his arms and legs (chronic).  Significant left hip and left knee pain.  No choking or coughing with meals or liquids.  No paresthesias distally on the lower limbs but he does have  Thumb opposition MMT bilaterally was 4 -/5.  Power  seemed weaker on the left than right.  There was no atrophy or tremor.  Sensation seemed intact.    Lower extremities: His left hip and thigh were tender and swollen as expected. Staples approximated to the small incisions well.  He had very limited left hip flexion or knee extension with pain.  There were no signs of DVT.  Calves were nontender.  The left ankle dorsiflexion lags behind right.  Sensation was diminished at the anterior left thigh only.    Sitting balance was fair-.  Standing balance was poor.    Lab Results   Component Value Date    WBC 6.9 12/19/2024    HGB 7.6 (L) 12/19/2024    HCT 24.8 (L) 12/19/2024    .5 (H) 12/19/2024     12/19/2024     Lab Results   Component Value Date    INR 1.1 12/09/2024    INR 1.00 10/16/2018    INR 1.04 07/24/2018    PROTIME 14.2 12/09/2024    PROTIME 11.6 10/16/2018    PROTIME 11.8 07/24/2018     Lab Results   Component Value Date    CREATININE 0.6 (L) 12/13/2024    BUN 11 12/13/2024     12/13/2024    K 3.5 12/13/2024     12/13/2024    CO2 30 12/13/2024     Lab Results   Component Value Date    ALT 8 (L) 12/07/2023    AST 18 12/07/2023    ALKPHOS 113 12/07/2023    BILITOT 0.1 12/07/2023         Impression: The patient is a 64-year-old right-hand-dominant male with a history of a seizure disorder, prior stroke with chronic ataxia and left hemiparesis, dementia, depression and chronic thinness who suffered mechanical fall with left hip fracture.  Pain is poorly controlled.    Strengths for the patient: His age, his independent habits prior to admission and a supportive family (his brother).    Limitations/barriers for the patient: His coexisting neurologic and orthopedic issues, the poor pain control and his seizure disorder.    Recommendation: Acute inpatient rehabilitation with occupational and physical therapy 180 minutes 5 out of every 7 days. Will address basic and  advancing mobility

## 2024-12-20 NOTE — PROGRESS NOTES
Occupational Therapy                              [facility] ARU OCCUPATIONAL THERAPY EVALUATION    Chart Review:  Past Medical History:   Diagnosis Date    Cerebral artery occlusion with cerebral infarction (HCC)     Hypertension     Seizures (HCC)      Past Surgical History:   Procedure Laterality Date    HIP SURGERY Left 12/10/2024    HIP INTRAMEDULLARY NAIL YI INSERTION performed by Cole Jones MD at [facility] OR     Social History:  Social/Functional History  Lives With: Family (Brother (Tevin, retired))  Type of Home: House  Home Layout: One level (1 step into/out of rest of house from bedroom)  Home Access: Stairs to enter with rails  Entrance Stairs - Number of Steps: 3 ALEXSANDRA  Entrance Stairs - Rails: Both (can reach both simultaneously)  Bathroom Shower/Tub: Tub/Shower unit  Bathroom Toilet: Standard  Bathroom Equipment: Grab bars in shower, Grab bars around toilet  Home Equipment: Cane, Walker - Standard, Reacher, Sock aid, Long-handled shoehorn, Leg   Has the patient had two or more falls in the past year or any fall with injury in the past year?: Yes (1 fall resulting in L hip fx and this hospitalization)  Prior Level of Assist for ADLs: Independent  Prior Level of Assist for Homemaking: Independent  Homemaking Responsibilities: Yes  Meal Prep Responsibility: No  Laundry Responsibility: Primary  Cleaning Responsibility: Secondary (lighter cleaning)  Bill Paying/Finance Responsibility: Secondary  Shopping Responsibility: Secondary  Health Care Management: Primary (uses pillbox)  Prior Level of Assist for Ambulation: Independent household ambulator, with or without device, Independent community ambulator, with or without device (does not use AD at [facility])  Prior Level of Assist for Transfers: Independent  Active : No  Patient's  Info: hasn't driven since CVA  Mode of Transportation: Car  Occupation: On disability  Additional Comments: Pt typically sleeps in a flat, regular bed at  Judgement: Decreased awareness of need for safety  Insights: Decreased awareness of deficits    Perception:  Perception  Overall Perceptual Status: Impaired (suspect residual L neglect deficits from remote CVA)      Assessment:     Performance deficits / Impairments: Decreased functional mobility , Decreased ADL status, Decreased safe awareness, Decreased cognition, Decreased endurance, Decreased sensation, Decreased balance, Decreased vision/visual deficit, Decreased high-level IADLs, Decreased coordination, Decreased posture    The patient is a 64-year-old right-hand-dominant male with chronic clumsiness with his seizure disorder and past stroke with ataxia and left hemiparesis.  On 12/9/2024 he was descending stairs at home when he \"missed the last step\" and fell to the ground landing hard onto his left side.  He denied striking his head, losing consciousness or experiencing palpitations over dizziness prior to the injury.  He was unable to stand by himself due to leg pain.  He was urgently brought to our ED where a minimally displaced left intertrochanteric hip fracture was identified. The next day Dr. Jones performed a left hip intramedullary nail fixation of the fracture.  He has allowed weightbearing as tolerated but the patient tolerates it poorly.  His uncontrolled pain, chronic left-sided weakness and ataxia have made his recovery quite complicated.  He has not had any perioperative seizure activity, fever or other evidence of infection. (Taken from MD H&P)  Pt did require a blood transfusion d/t post op anemia.      PTA, pt lives c brother and is Ind c ADLs, some IADLs but does not drive; and Ind c functional transfers/mobility.    Today, pt reported pain but was still motivated to participate.  Pt required assist for LB ADLs 2* decreased strength and pain but I predict pt will progress well using AE PRN.  Pt did range from CGA-min A for balance during mobility in room, and demo'ed what I suspect is  mobility training, Endurance training, Pain management, Safety education & training, Patient/Caregiver education & training, Equipment evaluation, education, & procurement, Positioning, Self-Care / ADL, Home management training, Group Therapy    OT Individual Minutes  Time In: 0930  Time Out: 1100  Minutes: 90           Evaluation was completed 1:1 session.      OT  Individual Evaluation 25   Individual tx performed as shown below   Therapeutic Exercise    ADL Self-care 65   Neuro Re-Ed    Therapeutic Activity    Group    Variance with Reason:    TOTAL 90     Electronically signed by:    Lory Valencia MS, OTR/L  License #[id number]    12/20/2024, 12:38 PM

## 2024-12-20 NOTE — PLAN OF CARE
Problem: Chronic Conditions and Co-morbidities  Goal: Patient's chronic conditions and co-morbidity symptoms are monitored and maintained or improved  12/20/2024 1132 by Charu Estrada, RN  Outcome: Progressing  12/20/2024 0340 by Tatiana Weller LPN  Outcome: Progressing

## 2024-12-20 NOTE — CARE COORDINATION
Case Management Admission Note    Patient:Brent Degroot      :1960  MRN:[id number]  Rehab Dx/Hx: Fracture of unspecified part of neck of left femur, initial encounter for closed fracture [S72.002A]  Closed intertrochanteric fracture of hip, left, initial encounter ([facility]) [S72.142A]    Chief Complaint:   Past Medical History:   Diagnosis Date    Cerebral artery occlusion with cerebral infarction ([facility])     Hypertension     Seizures ([facility])      Past Surgical History:   Procedure Laterality Date    HIP SURGERY Left 12/10/2024    HIP INTRAMEDULLARY NAIL YI INSERTION performed by Cole Jones MD at [facility] OR     Allergies   Allergen Reactions    Shellfish-Derived Products      Precautions: falls    Date of Admit: 2024  Room #: 1022/1022-A    Current functional status at time of admit:  Home Living/DME Available:  Type of Home: House  Home Access: Stairs to enter with rails  Bathroom Shower/Tub: Tub/Shower unit  Bathroom Toilet: Standard  Bathroom Equipment: Grab bars in shower, Grab bars around toilet  Home Equipment: Cane, Walker - Standard, Reacher, Sock aid, Long-handled shoehorn, Leg     IADL Hx:  Homemaking Responsibilities: Yes  Active : No  Mode of Transportation: Car  Occupation: On disability    Family: Patient reported that he has lots of support from his brother and other family.     Patient's Goal: \"do whatever it takes\" and return home     Would you like Case Management to discuss the discharge plan with any other family members/significant others, and if so, who? \"my brother Tevin\"    Family's Goal: VASQUEZ spoke with brother. His goal is for patient to \"be more aware and take his time when walking\" and return home.     VASQUEZ met with patient for admission assessment. Patient lives with brother in a 1-level home in [place]. There are 3 steps to enter and 1 step inside. Patient has PCP, was independent in ADLs PTA, and uses [facility] [address] in [place] for prescriptions. Home

## 2024-12-20 NOTE — PROGRESS NOTES
Comprehensive Nutrition Assessment    Type and Reason for Visit:  Initial, Consult (oral nutrition supplement)    Nutrition Recommendations/Plan:   Continue regular diet with snacks as desires  Continue to offer high calorie oral nutrition supplement   Will continue to follow up during stay     Malnutrition Assessment:  Malnutrition Status:  At risk for malnutrition (12/20/24 7508)    Context:  Acute Illness       Nutrition Assessment:    Admit to acute rehab unit with hx fall hip fracture now s/p surgery. Consuming % of meals on regular diet. Patient reports good appetite at this time, eating all of meals. Will also consume oral nutrition supplements if vanilla or strawberry flavor. No signficant weight loss in past year. Needs to continue with adequate intake with supplements to prevent weight loss.  Will follow at moderate nutrition risk at this time with increased needs.    Nutrition Related Findings:    up in chair resting after PT, hx CVA, advanced dementia      small stature and frame   meds noted: aricept, pepcid, folvite, B12, B6 Wound Type: Surgical Incision       Current Nutrition Intake & Therapies:    Average Meal Intake: %  Average Supplements Intake: %  ADULT DIET; Regular  ADULT ORAL NUTRITION SUPPLEMENT; Lunch, Breakfast, Dinner; Standard High Calorie/High Protein Oral Supplement    Anthropometric Measures:  Height: 157.5 cm (5' 2.01\")  Ideal Body Weight (IBW): 118 lbs (54 kg)    Admission Body Weight: 46.6 kg (102 lb 11.8 oz)  Current Body Weight: 46.6 kg (102 lb 11.8 oz), 87.1 % IBW. Weight Source: Bed scale  Current BMI (kg/m2): 18.8  Usual Body Weight: 45.1 kg (99 lb 6.8 oz) (Oct 2023)     % Weight Change (Calculated): 3.3  Weight Adjustment For: No Adjustment                 BMI Categories: Normal Weight (BMI 18.5-24.9)    Estimated Daily Nutrient Needs:  Energy Requirements Based On: Kcal/kg  Weight Used for Energy Requirements: Current  Energy (kcal/day): 6757-7920 (30-35

## 2024-12-20 NOTE — PROGRESS NOTES
Physical Therapy  [facility] ARU PHYSICAL THERAPY EVALUATION    Chart Review:  Past Medical History:   Diagnosis Date    Cerebral artery occlusion with cerebral infarction (HCC)     Hypertension     Seizures (HCC)      Past Surgical History:   Procedure Laterality Date    HIP SURGERY Left 12/10/2024    HIP INTRAMEDULLARY NAIL YI INSERTION performed by Cole Jones MD at [facility] OR     Fall History: Has the patient had two or more falls in the past year or any fall with injury in the past year?: Yes (1 fall resulting in L hip fx and this hospitalization)  Social History:  Social/Functional History  Lives With: Family (Brother (Tevin, retired))  Type of Home: House  Home Layout: One level (1 step into/out of rest of house from bedroom)  Home Access: Stairs to enter with rails  Entrance Stairs - Number of Steps: 3 ALEXSANDRA  Entrance Stairs - Rails: Both (can reach both simultaneously)  Bathroom Shower/Tub: Tub/Shower unit  Bathroom Toilet: Standard  Bathroom Equipment: Grab bars in shower, Grab bars around toilet  Home Equipment: Cane, Walker - Standard, Reacher, Sock aid, Long-handled shoehorn, Leg   Has the patient had two or more falls in the past year or any fall with injury in the past year?: Yes (1 fall resulting in L hip fx and this hospitalization)  Prior Level of Assist for ADLs: Independent  Prior Level of Assist for Homemaking: Independent  Homemaking Responsibilities: Yes  Meal Prep Responsibility: No  Laundry Responsibility: Primary  Cleaning Responsibility: Secondary (lighter cleaning)  Bill Paying/Finance Responsibility: Secondary  Shopping Responsibility: Secondary  Health Care Management: Primary (uses pillbox)  Prior Level of Assist for Ambulation: Independent household ambulator, with or without device, Independent community ambulator, with or without device (does not use AD at [facility])  Prior Level of Assist for Transfers: Independent  Active : No  Patient's  Info: hasn't driven since  weakness and ataxia have made his recovery quite complicated.  He has not had any perioperative seizure activity, fever or other evidence of infection. (Taken from MD H&P)  Pt did require a blood transfusion d/t post op anemia. Hbg yesterday 7.6.       PTA, pt lives with brother and is Ind c ADLs, some IADLs but does not drive; and Ind with functional transfers/mobility without  device.  Pt presented today with need for pain pill at start of session due to 8/10 hip pain. Pt also with complaints that his eyes were bothering him from the fluorescent lights. PT able to provide him with blue lensed glasses which he said helped, but did not hinder his vision. Pt has residual deficits in vision and L UE/LE function from 2017 CVA with decreased sensation, some increased tone which seems affected by pain level. Pt had significant gait deviations, but only required CG at most with gait. Pt exhibits some memory issues, but did know sequence for stairs without cues. Feel pt will benefit from ARU-PT to address deficits to improve function and return home at mod I for basic mobility.        Therapy Prognosis: Good  Decision Making: Medium Complexity  Clinical Presentation: evolving presentation      Patient education:   ARU schedule, ARU expectations for participation, plan of care,   Treatment Initiated:  Functional mobility training, gait training, patient education  Barriers to Improvement:  old CVA  Discharge Recommendations:  home with assist with IADL, HHC  Equipment Recommendations:  2ww(pt does not think he still has the one from his CVA)    Goals:  Patient Goals   Patient Goals : return home  Short Term Goals  Time Frame for Short Term Goals: 7 days STG=LTG  Short Term Goal 1: Pt will complete all bed mobility with mod I  Short Term Goal 2: Pt will complete all functional transfers with mod I  Short Term Goal 3: Pt will ambulate with 2ww on level surface 150' and 10' on unlevel surface with mod I  Short Term Goal 4: Pt

## 2024-12-20 NOTE — PROGRESS NOTES
Brent Degroot    : 1960  [id number]  MRN: [id number]              PM&R Progress Note      Admitting diagnosis: Left intertrochanteric hip fracture (IGC 8.11)     Comorbid diagnoses impacting rehabilitation: Uncontrolled pain, generalized weakness, gait disturbance, acute blood loss anemia, seizure disorder, vascular dementia, dysthymia, history of CVA with chronic left hemiparesis.     Chief complaint: Urinary frequency.  Hip pain.    Prior (baseline) level of function: Independent.    Current level of function:         Current  IRF-MANNY and Goals:   Occupational Therapy:    Short Term Goals  Time Frame for Short Term Goals: STGs=LTGs :   Long Term Goals  Time Frame for Long Term Goals : 10 days or until d/c  Long Term Goal 1: Pt will complete grooming tasks Ind  Long Term Goal 2: Pt will complete total body bathing mod I  Long Term Goal 3: Pt will complete UB dressing Ind  Long Term Goal 4: Pt will complete LB dressing mod I using AE PRN  Long Term Goal 5: Pt will doff/don footwear mod I using AE PRN  Additional Goals?: Yes  Long Term Goal 6: Pt will complete toileting mod I  Long Term Goal 7: Pt will complete functional transfers (bed, chair, toilet, shower) c DME PRN and mod I  Long Term Goal 8: Pt will perform therex/therax to facilitate increased strength/endurance/ax tolerance (c emphasis on dynamic standing balance/tolerance >8 mins, BUE endurance) c SBA  Long Term Goal 9: Pt will complete simple homemaking tasks c DME PRN and S :                                       Eating: Eating  Assistance Needed: Independent  Comment: able to open packages, feed self  CARE Score: 6  Discharge Goal: Independent       Oral Hygiene: Oral Hygiene  Assistance Needed: Supervision or touching assistance  Comment: close SBA in stance at sink  CARE Score: 4  Discharge Goal: Independent    UB/LB Bathing: Shower/Bathe Self  Assistance Needed: Partial/moderate assistance  Comment: performed full shower seated,  coughing or wheezing.    Cardiac: Regular rate and rhythm.    Abdomen: Patient's abdomen is soft and nondistended.  Bowel sounds were present throughout.  There was no rebound, guarding or masses noted.    Upper extremities: Clumsy movements of both upper limbs with poor dexterity, particularly on the left.  No DTRs.    Lower extremities: His left hip and thigh are tender and swollen as expected.  Staples approximate the small incisions well.  No signs of DVT.  Very guarded left hip flexion.  Incomplete left ankle dorsiflexion to command.  Heels were clear.    Sitting balance was fair.  Standing balance was poor.    Lab Results   Component Value Date    WBC 6.9 12/19/2024    HGB 7.6 (L) 12/19/2024    HCT 24.8 (L) 12/19/2024    .5 (H) 12/19/2024     12/19/2024     Lab Results   Component Value Date    INR 1.1 12/09/2024    INR 1.00 10/16/2018    INR 1.04 07/24/2018    PROTIME 14.2 12/09/2024    PROTIME 11.6 10/16/2018    PROTIME 11.8 07/24/2018     Lab Results   Component Value Date    CREATININE 0.6 (L) 12/13/2024    BUN 11 12/13/2024     12/13/2024    K 3.5 12/13/2024     12/13/2024    CO2 30 12/13/2024     Lab Results   Component Value Date    ALT 8 (L) 12/07/2023    AST 18 12/07/2023    ALKPHOS 113 12/07/2023    BILITOT 0.1 12/07/2023       Expected length of stay  prior to a supervised level of function for discharge home with a walker and [facility] OT/PT is 12 days.    Recommendations:    Left intertrochanteric hip fracture with gait disturbance: We are developing the routine for his daily occupational and physical therapy.  He is exhibiting some impulse control issues and poor insight.  With his persistent pain, leg weakness and chronic ataxia, he is at risk for fall with injury during standing ADLs and mobility activities.  Providing him adaptive equipment training with strengthening exercises, balance recovery training and conditioning development.  Closely monitoring his incision for

## 2024-12-21 PROCEDURE — 6370000000 HC RX 637 (ALT 250 FOR IP): Performed by: PHYSICAL MEDICINE & REHABILITATION

## 2024-12-21 PROCEDURE — 97110 THERAPEUTIC EXERCISES: CPT

## 2024-12-21 PROCEDURE — 97530 THERAPEUTIC ACTIVITIES: CPT

## 2024-12-21 PROCEDURE — 97116 GAIT TRAINING THERAPY: CPT

## 2024-12-21 PROCEDURE — 6360000002 HC RX W HCPCS: Performed by: NURSE PRACTITIONER

## 2024-12-21 PROCEDURE — 1280000000 HC REHAB R&B

## 2024-12-21 PROCEDURE — 6370000000 HC RX 637 (ALT 250 FOR IP): Performed by: NURSE PRACTITIONER

## 2024-12-21 PROCEDURE — 94150 VITAL CAPACITY TEST: CPT

## 2024-12-21 PROCEDURE — 94761 N-INVAS EAR/PLS OXIMETRY MLT: CPT

## 2024-12-21 PROCEDURE — 97535 SELF CARE MNGMENT TRAINING: CPT

## 2024-12-21 RX ADMIN — Medication 1000 MCG: at 10:11

## 2024-12-21 RX ADMIN — FAMOTIDINE 20 MG: 20 TABLET ORAL at 10:11

## 2024-12-21 RX ADMIN — LACOSAMIDE 200 MG: 100 TABLET, FILM COATED ORAL at 10:11

## 2024-12-21 RX ADMIN — LEVETIRACETAM 1500 MG: 500 TABLET, FILM COATED ORAL at 21:27

## 2024-12-21 RX ADMIN — MEMANTINE 10 MG: 10 TABLET ORAL at 10:11

## 2024-12-21 RX ADMIN — DIVALPROEX SODIUM 500 MG: 500 TABLET, DELAYED RELEASE ORAL at 17:20

## 2024-12-21 RX ADMIN — SERTRALINE HYDROCHLORIDE 200 MG: 50 TABLET ORAL at 10:11

## 2024-12-21 RX ADMIN — ACETAMINOPHEN 650 MG: 325 TABLET ORAL at 17:19

## 2024-12-21 RX ADMIN — ACETAMINOPHEN 650 MG: 325 TABLET ORAL at 10:15

## 2024-12-21 RX ADMIN — ATORVASTATIN CALCIUM 40 MG: 40 TABLET, FILM COATED ORAL at 21:28

## 2024-12-21 RX ADMIN — MELATONIN TAB 3 MG 6 MG: 3 TAB at 21:28

## 2024-12-21 RX ADMIN — ACETAMINOPHEN 650 MG: 325 TABLET ORAL at 21:28

## 2024-12-21 RX ADMIN — OXYCODONE HYDROCHLORIDE 10 MG: 10 TABLET ORAL at 06:45

## 2024-12-21 RX ADMIN — DIVALPROEX SODIUM 500 MG: 500 TABLET, DELAYED RELEASE ORAL at 10:11

## 2024-12-21 RX ADMIN — DONEPEZIL HYDROCHLORIDE 10 MG: 10 TABLET ORAL at 21:28

## 2024-12-21 RX ADMIN — LEVETIRACETAM 1500 MG: 500 TABLET, FILM COATED ORAL at 10:12

## 2024-12-21 RX ADMIN — LACOSAMIDE 200 MG: 100 TABLET, FILM COATED ORAL at 21:28

## 2024-12-21 RX ADMIN — ENOXAPARIN SODIUM 30 MG: 100 INJECTION SUBCUTANEOUS at 17:20

## 2024-12-21 RX ADMIN — SENNOSIDES AND DOCUSATE SODIUM 1 TABLET: 50; 8.6 TABLET ORAL at 10:12

## 2024-12-21 RX ADMIN — FAMOTIDINE 20 MG: 20 TABLET ORAL at 21:28

## 2024-12-21 RX ADMIN — FOLIC ACID 0.5 MG: 1 TABLET ORAL at 10:11

## 2024-12-21 RX ADMIN — CLOPIDOGREL BISULFATE 75 MG: 75 TABLET ORAL at 10:12

## 2024-12-21 RX ADMIN — Medication 100 MG: at 10:11

## 2024-12-21 RX ADMIN — MEMANTINE 10 MG: 10 TABLET ORAL at 21:28

## 2024-12-21 RX ADMIN — Medication 1000 MCG: at 21:28

## 2024-12-21 RX ADMIN — SENNOSIDES AND DOCUSATE SODIUM 1 TABLET: 50; 8.6 TABLET ORAL at 21:28

## 2024-12-21 ASSESSMENT — PAIN DESCRIPTION - DESCRIPTORS
DESCRIPTORS: DISCOMFORT
DESCRIPTORS: ACHING;SHARP
DESCRIPTORS: DISCOMFORT;ACHING

## 2024-12-21 ASSESSMENT — PAIN DESCRIPTION - ORIENTATION
ORIENTATION: LEFT

## 2024-12-21 ASSESSMENT — PAIN DESCRIPTION - LOCATION
LOCATION: HIP
LOCATION: HIP;LEG
LOCATION: LEG

## 2024-12-21 ASSESSMENT — PAIN DESCRIPTION - FREQUENCY: FREQUENCY: CONTINUOUS

## 2024-12-21 ASSESSMENT — PAIN SCALES - GENERAL
PAINLEVEL_OUTOF10: 7
PAINLEVEL_OUTOF10: 9

## 2024-12-21 ASSESSMENT — PAIN DESCRIPTION - PAIN TYPE: TYPE: SURGICAL PAIN

## 2024-12-21 ASSESSMENT — PAIN - FUNCTIONAL ASSESSMENT
PAIN_FUNCTIONAL_ASSESSMENT: ACTIVITIES ARE NOT PREVENTED
PAIN_FUNCTIONAL_ASSESSMENT: ACTIVITIES ARE NOT PREVENTED

## 2024-12-21 ASSESSMENT — PAIN SCALES - WONG BAKER: WONGBAKER_NUMERICALRESPONSE: HURTS EVEN MORE

## 2024-12-21 ASSESSMENT — PAIN DESCRIPTION - ONSET: ONSET: ON-GOING

## 2024-12-21 NOTE — PLAN OF CARE
Problem: Chronic Conditions and Co-morbidities  Goal: Patient's chronic conditions and co-morbidity symptoms are monitored and maintained or improved  12/21/2024 0023 by Shelley Lee LPN  Outcome: Progressing  12/20/2024 1132 by Charu Estrada RN  Outcome: Progressing     Problem: Discharge Planning  Goal: Discharge to home or other facility with appropriate resources  Outcome: Progressing     Problem: Safety - Adult  Goal: Free from fall injury  Outcome: Progressing     Problem: Pain  Goal: Verbalizes/displays adequate comfort level or baseline comfort level  Outcome: Progressing     Problem: ABCDS Injury Assessment  Goal: Absence of physical injury  Outcome: Progressing     Problem: Nutrition Deficit:  Goal: Optimize nutritional status  12/21/2024 0023 by Shelley Lee LPN  Outcome: Progressing  12/20/2024 1540 by Lenore Ignacio RD, LD  Flowsheets  Taken 12/20/2024 1540  Nutrient intake appropriate for improving, restoring, or maintaining nutritional needs:   Assess nutritional status and recommend course of action   Monitor oral intake, labs, and treatment plans   Recommend appropriate diets, oral nutritional supplements, and vitamin/mineral supplements  Taken 12/20/2024 1531  Nutrient intake appropriate for improving, restoring, or maintaining nutritional needs:   Assess nutritional status and recommend course of action   Monitor oral intake, labs, and treatment plans   Recommend appropriate diets, oral nutritional supplements, and vitamin/mineral supplements

## 2024-12-21 NOTE — PROGRESS NOTES
Physical Rehabilitation: OCCUPATIONAL THERAPY     [x] daily progress note       [] discharge       Patient Name:  Brent Degroot   :  1960 MRN: [id number]  Room:  [address] [place] [address] Date of Admission: 2024  Rehabilitation Diagnosis:   Fracture of unspecified part of neck of left femur, initial encounter for closed fracture [S72.002A]  Closed intertrochanteric fracture of hip, left, initial encounter (formerly [facility]) [S72.142A]       Date 2024       Day of ARU Week:  3   Time IN/OUT 5385-4564  7413-1424   Individual Tx Minutes 120   Group Tx Minutes    Co-Treat Minutes    Concurrent Tx Minutes    TOTAL Tx Time Mins 120   Variance Time    Variance Reason []   Refusal due to:     []   Medical hold/reason:    []   Illness   []   Off Unit for test/procedure  []   Extra time needed to complete task  []   Therapeutic need  []   Make up mins were attempted but pt unable to complete due to (specify)  []   Other (specify):   Restrictions Restrictions/Precautions  Restrictions/Precautions: General Precautions, Fall Risk, Weight Bearing (WBAT LLE, IV RUE)      Communication with other providers: []   OK to see per nursing:     []   Spoke with team member regarding:      Subjective observations and cognitive status: Pt alert orientated and agreeable to tx     Pain level/location:   5 /10       Location: LLE   Discharge recommendations  Anticipated discharge date:  TBD  Destination: []home alone   []home alone w assist prn [] home w/ family    [] Continuous supervision       []SNF            [] Assisted living     [] Other:   Continued therapy: []HHC OT  []OUTPATIENT  OT   [] No Further OT  Equipment needs: TBD   Eating/Feeding:     S/U sitting EOB unsupported 45 active min  c VCs to increase pace.  Extremity Used:        [x]    R UE []    L UE         Dentures: [x]   N/A    []    Partial []    Full  Adaptive Equipment Used:        Bed Mobility:           []   Pt received out of bed   Rolling R/L:  SUP  Scooting:  SUP  Supine  --> Sit:  SUP  Sit --> Supine:  SUP    Transfers:    Sit--> Stand:  SBA  Stand --> Sit:   SBA  Stand-Pivot:   CGA  Other:    Assistive device required for transfer:   RW      Functional Mobility:    Assistance:  SUP/CGA Pt self propelled w/c  c BUEs 119ft and cut L hand while self propelling. This therapist reported to NSG whom came, examined, clean, and bandaged.  Device:   []   Rolling Walker     []   Standard Walker [x]   Wheelchair        []   Cane       []   4-Wheeled Walker         []   Cardiac Walker       []   Other:        Additional Therapeutic activities/exercises completed this date:     [x]   ADL Training   [x]   Balance/Postural training     [x]   Bed/Transfer Training   [x]   Endurance Training   []   Neuromuscular Re-ed   []   Nu-step:  Time:        Level:         #Steps:       []   Rebounder:    []  Seated     []  Standing        []   Supine Ther Ex (reps/sets):     [x]   Seated Ther Ex (reps/sets):  For BUE strengthening for ADL indep & functional moblility, pt completed arm bike use x 20 min c MOD resistance with 2 extended rest breaks & with min difficulty      []   Standing Ther Ex (reps/sets):     [x]   Other: Educated then instructed pt on using reacher.  Pt demonstrated understanding and good carryover.  Pt performed BUE reaching task c RW @SUP using reacher crossing midline while navigating to retrieve 10 cones from various height levels around gym to increase independence in functional tasks.       Comments:  Pt hyper verbalized throughout tx requiring @MAX VCs for redirection to task.    Patient/Caregiver Education and Training:   [x]   Adaptive Equipment Use  [x]   Bed Mobility/Transfer Technique/Safety  [x]   Energy Conservation Tips  []   Family training  [x]   Postural Awareness  [x]   Safety During Functional Activities  [x]   Reinforced Patient's Precautions       Treatment Plan for Next Session: continue in current POC.      Assessment:  This pt demonstrated a positive response to  pillbox)  Prior Level of Assist for Ambulation: Independent household ambulator, with or without device, Independent community ambulator, with or without device (does not use AD at [facility])  Prior Level of Assist for Transfers: Independent  Active : No  Patient's  Info: hasn't driven since CVA  Mode of Transportation: Car  Occupation: On disability  Additional Comments: Pt typically sleeps in a flat, regular bed at home    Objective                                                                                    Goals:  (Update in navigator)  Short Term Goals  Time Frame for Short Term Goals: STGs=LTGs:  Long Term Goals  Time Frame for Long Term Goals : 10 days or until d/c  Long Term Goal 1: Pt will complete grooming tasks Ind  Long Term Goal 2: Pt will complete total body bathing mod I  Long Term Goal 3: Pt will complete UB dressing Ind  Long Term Goal 4: Pt will complete LB dressing mod I using AE PRN  Long Term Goal 5: Pt will doff/don footwear mod I using AE PRN  Additional Goals?: Yes  Long Term Goal 6: Pt will complete toileting mod I  Long Term Goal 7: Pt will complete functional transfers (bed, chair, toilet, shower) c DME PRN and mod I  Long Term Goal 8: Pt will perform therex/therax to facilitate increased strength/endurance/ax tolerance (c emphasis on dynamic standing balance/tolerance >8 mins, BUE endurance) c SBA  Long Term Goal 9: Pt will complete simple homemaking tasks c DME PRN and S:        Plan of Care                                                                              Times per week: 5 days per week for a minimum of 60 minutes/day plus group as appropriate for 60 minutes.  Treatment to include Occupational Therapy Plan  Current Treatment Recommendations: Balance training, Functional mobility training, Endurance training, Pain management, Safety education & training, Patient/Caregiver education & training, Equipment evaluation, education, & procurement, Positioning,

## 2024-12-21 NOTE — FLOWSHEET NOTE
[x] daily progress note       [] discharge       Patient Name:  Brent Degroot   :  1960 MRN: [id number]  Room:  [address] [place] [address] Date of Admission: 2024  Rehabilitation Diagnosis:   Fracture of unspecified part of neck of left femur, initial encounter for closed fracture [S72.002A]  Closed intertrochanteric fracture of hip, left, initial encounter ([facility]) [S72.142A]       Date 2024       Day of ARU Week:  3   Time IN/-1030   Individual Tx Minutes 60   Group Tx Minutes    Co-Treat Minutes    Concurrent Tx Minutes    TOTAL Tx Time Mins 60   Variance Time    Variance Reason []   Refusal due to:     []   Medical hold/reason:    []   Illness   []   Off Unit for test/procedure  []   Extra time needed to complete task  []   Therapeutic need  []   Make up mins were attempted but pt unable to complete due to (specify)  []   Other (specify):   Restrictions Restrictions/Precautions  Restrictions/Precautions: General Precautions, Fall Risk, Weight Bearing (WBAT LLE, IV RUE)      Communication with other providers: [x]   OK to see per nursing:     [x]   Spoke with team member regarding:      Subjective observations and cognitive status: Pt was seen sitting EOB finishing breakfast. Pt was alert and agreeable to treatment session.     Pain level/location:    9/10       Location: LLE (RN aware)   Discharge recommendations  Anticipated discharge date:  TBD  Destination: []home alone   []home alone with assist PRN     [] home w/ family      [] Continuous supervision  []SNF    [] Assisted living     [] Other:   Continued therapy: []HHC PT  []OUTPATIENT  PT   [] No Further PT  Equipment needs: TBD       Bed Mobility:           [x]   Pt received out of bed     Transfers:    Sit--> Stand:  CGA  Stand --> Sit:   CGA  Car Transfers:  SBA (able to assist LLE into and out)   Toilet Transfer (if applicable): CGA  Other:    Assistive device required for transfer:   2WW    Gait:    Distance:  95' x 2 + room

## 2024-12-21 NOTE — PLAN OF CARE
Problem: Chronic Conditions and Co-morbidities  Goal: Patient's chronic conditions and co-morbidity symptoms are monitored and maintained or improved  12/21/2024 1256 by Meg Casas RN  Outcome: Progressing  12/21/2024 0023 by Shelley Lee LPN  Outcome: Progressing     Problem: Discharge Planning  Goal: Discharge to home or other facility with appropriate resources  12/21/2024 1256 by Meg Casas RN  Outcome: Progressing  12/21/2024 0023 by Shelley Lee LPN  Outcome: Progressing     Problem: Safety - Adult  Goal: Free from fall injury  12/21/2024 1256 by Meg Casas RN  Outcome: Progressing  12/21/2024 0023 by Shelley Lee LPN  Outcome: Progressing     Problem: Pain  Goal: Verbalizes/displays adequate comfort level or baseline comfort level  12/21/2024 1256 by Meg Casas RN  Outcome: Progressing  12/21/2024 0023 by Shelley Lee LPN  Outcome: Progressing     Problem: ABCDS Injury Assessment  Goal: Absence of physical injury  12/21/2024 1256 by Meg Casas RN  Outcome: Progressing  12/21/2024 0023 by Shelley Lee LPN  Outcome: Progressing     Problem: Nutrition Deficit:  Goal: Optimize nutritional status  12/21/2024 1256 by Meg Casas RN  Outcome: Progressing  12/21/2024 0023 by Shelley Lee LPN  Outcome: Progressing

## 2024-12-22 VITALS
SYSTOLIC BLOOD PRESSURE: 135 MMHG | DIASTOLIC BLOOD PRESSURE: 90 MMHG | HEART RATE: 98 BPM | RESPIRATION RATE: 16 BRPM | WEIGHT: 97 LBS | TEMPERATURE: 98.2 F | OXYGEN SATURATION: 94 % | BODY MASS INDEX: 17.85 KG/M2 | HEIGHT: 62 IN

## 2024-12-22 LAB
BASOPHILS # BLD: 0.07 K/UL
BASOPHILS NFR BLD: 1 % (ref 0–1)
EOSINOPHIL # BLD: 0.08 K/UL
EOSINOPHILS RELATIVE PERCENT: 1 % (ref 0–3)
ERYTHROCYTE [DISTWIDTH] IN BLOOD BY AUTOMATED COUNT: 16.4 % (ref 11.7–14.9)
HCT VFR BLD AUTO: 28.8 % (ref 42–52)
HGB BLD-MCNC: 9 G/DL (ref 13.5–18)
IMM GRANULOCYTES # BLD AUTO: 0.05 K/UL
IMM GRANULOCYTES NFR BLD: 1 %
LYMPHOCYTES NFR BLD: 1.99 K/UL
LYMPHOCYTES RELATIVE PERCENT: 21 % (ref 24–44)
MCH RBC QN AUTO: 33.2 PG (ref 27–31)
MCHC RBC AUTO-ENTMCNC: 31.3 G/DL (ref 32–36)
MCV RBC AUTO: 106.3 FL (ref 78–100)
MONOCYTES NFR BLD: 1.01 K/UL
MONOCYTES NFR BLD: 11 % (ref 0–4)
NEUTROPHILS NFR BLD: 66 % (ref 36–66)
NEUTS SEG NFR BLD: 6.09 K/UL
PLATELET # BLD AUTO: 566 K/UL (ref 140–440)
PMV BLD AUTO: 8.6 FL (ref 7.5–11.1)
RBC # BLD AUTO: 2.71 M/UL (ref 4.6–6.2)
WBC OTHER # BLD: 9.3 K/UL (ref 4–10.5)

## 2024-12-22 PROCEDURE — 94150 VITAL CAPACITY TEST: CPT

## 2024-12-22 PROCEDURE — 1280000000 HC REHAB R&B

## 2024-12-22 PROCEDURE — 97116 GAIT TRAINING THERAPY: CPT

## 2024-12-22 PROCEDURE — 97530 THERAPEUTIC ACTIVITIES: CPT

## 2024-12-22 PROCEDURE — 6370000000 HC RX 637 (ALT 250 FOR IP): Performed by: PHYSICAL MEDICINE & REHABILITATION

## 2024-12-22 PROCEDURE — 6370000000 HC RX 637 (ALT 250 FOR IP): Performed by: NURSE PRACTITIONER

## 2024-12-22 PROCEDURE — 85025 COMPLETE CBC W/AUTO DIFF WBC: CPT

## 2024-12-22 PROCEDURE — 36415 COLL VENOUS BLD VENIPUNCTURE: CPT

## 2024-12-22 PROCEDURE — 94761 N-INVAS EAR/PLS OXIMETRY MLT: CPT

## 2024-12-22 PROCEDURE — 97110 THERAPEUTIC EXERCISES: CPT

## 2024-12-22 PROCEDURE — 94664 DEMO&/EVAL PT USE INHALER: CPT

## 2024-12-22 PROCEDURE — 6360000002 HC RX W HCPCS: Performed by: NURSE PRACTITIONER

## 2024-12-22 RX ADMIN — ACETAMINOPHEN 650 MG: 325 TABLET ORAL at 20:39

## 2024-12-22 RX ADMIN — MEMANTINE 10 MG: 10 TABLET ORAL at 20:39

## 2024-12-22 RX ADMIN — CLOPIDOGREL BISULFATE 75 MG: 75 TABLET ORAL at 10:38

## 2024-12-22 RX ADMIN — DIVALPROEX SODIUM 500 MG: 500 TABLET, DELAYED RELEASE ORAL at 10:37

## 2024-12-22 RX ADMIN — Medication 1000 MCG: at 10:37

## 2024-12-22 RX ADMIN — ACETAMINOPHEN 650 MG: 325 TABLET ORAL at 10:43

## 2024-12-22 RX ADMIN — Medication 100 MG: at 10:37

## 2024-12-22 RX ADMIN — ATORVASTATIN CALCIUM 40 MG: 40 TABLET, FILM COATED ORAL at 20:40

## 2024-12-22 RX ADMIN — LACOSAMIDE 200 MG: 100 TABLET, FILM COATED ORAL at 20:39

## 2024-12-22 RX ADMIN — ACETAMINOPHEN 650 MG: 325 TABLET ORAL at 15:03

## 2024-12-22 RX ADMIN — OXYCODONE HYDROCHLORIDE 10 MG: 10 TABLET ORAL at 19:03

## 2024-12-22 RX ADMIN — OXYCODONE HYDROCHLORIDE 10 MG: 10 TABLET ORAL at 15:03

## 2024-12-22 RX ADMIN — SERTRALINE HYDROCHLORIDE 200 MG: 50 TABLET ORAL at 10:37

## 2024-12-22 RX ADMIN — FOLIC ACID 0.5 MG: 1 TABLET ORAL at 10:38

## 2024-12-22 RX ADMIN — DIVALPROEX SODIUM 500 MG: 500 TABLET, DELAYED RELEASE ORAL at 18:16

## 2024-12-22 RX ADMIN — Medication 1000 MCG: at 20:39

## 2024-12-22 RX ADMIN — MELATONIN TAB 3 MG 6 MG: 3 TAB at 20:39

## 2024-12-22 RX ADMIN — SENNOSIDES AND DOCUSATE SODIUM 1 TABLET: 50; 8.6 TABLET ORAL at 10:37

## 2024-12-22 RX ADMIN — FAMOTIDINE 20 MG: 20 TABLET ORAL at 20:40

## 2024-12-22 RX ADMIN — ACETAMINOPHEN 650 MG: 325 TABLET ORAL at 02:25

## 2024-12-22 RX ADMIN — OXYCODONE HYDROCHLORIDE 10 MG: 10 TABLET ORAL at 10:38

## 2024-12-22 RX ADMIN — ENOXAPARIN SODIUM 30 MG: 100 INJECTION SUBCUTANEOUS at 18:16

## 2024-12-22 RX ADMIN — LEVETIRACETAM 1500 MG: 500 TABLET, FILM COATED ORAL at 20:39

## 2024-12-22 RX ADMIN — LACOSAMIDE 200 MG: 100 TABLET, FILM COATED ORAL at 10:37

## 2024-12-22 RX ADMIN — LEVETIRACETAM 1500 MG: 500 TABLET, FILM COATED ORAL at 10:37

## 2024-12-22 RX ADMIN — MEMANTINE 10 MG: 10 TABLET ORAL at 10:39

## 2024-12-22 RX ADMIN — FAMOTIDINE 20 MG: 20 TABLET ORAL at 10:38

## 2024-12-22 RX ADMIN — SENNOSIDES AND DOCUSATE SODIUM 1 TABLET: 50; 8.6 TABLET ORAL at 20:39

## 2024-12-22 RX ADMIN — DONEPEZIL HYDROCHLORIDE 10 MG: 10 TABLET ORAL at 20:40

## 2024-12-22 ASSESSMENT — PAIN - FUNCTIONAL ASSESSMENT
PAIN_FUNCTIONAL_ASSESSMENT: PREVENTS OR INTERFERES SOME ACTIVE ACTIVITIES AND ADLS
PAIN_FUNCTIONAL_ASSESSMENT: PREVENTS OR INTERFERES SOME ACTIVE ACTIVITIES AND ADLS
PAIN_FUNCTIONAL_ASSESSMENT: PREVENTS OR INTERFERES WITH ALL ACTIVE AND SOME PASSIVE ACTIVITIES

## 2024-12-22 ASSESSMENT — PAIN DESCRIPTION - DESCRIPTORS
DESCRIPTORS: DISCOMFORT;ACHING
DESCRIPTORS: CRAMPING;JABBING
DESCRIPTORS: ACHING;DISCOMFORT
DESCRIPTORS: DISCOMFORT;ACHING
DESCRIPTORS: ACHING;SHARP

## 2024-12-22 ASSESSMENT — PAIN SCALES - GENERAL
PAINLEVEL_OUTOF10: 8
PAINLEVEL_OUTOF10: 8
PAINLEVEL_OUTOF10: 0
PAINLEVEL_OUTOF10: 0
PAINLEVEL_OUTOF10: 8
PAINLEVEL_OUTOF10: 9

## 2024-12-22 ASSESSMENT — PAIN DESCRIPTION - ORIENTATION
ORIENTATION: MID;LEFT
ORIENTATION: LEFT
ORIENTATION: LEFT;MID
ORIENTATION: LEFT

## 2024-12-22 ASSESSMENT — PAIN DESCRIPTION - LOCATION
LOCATION: LEG
LOCATION: LEG
LOCATION: BACK;LEG
LOCATION: LEG
LOCATION: HIP
LOCATION: BACK;LEG

## 2024-12-22 ASSESSMENT — PAIN SCALES - WONG BAKER
WONGBAKER_NUMERICALRESPONSE: NO HURT
WONGBAKER_NUMERICALRESPONSE: NO HURT

## 2024-12-22 NOTE — PROGRESS NOTES
Adverse reaction to Tx:   [] Significant change in status    Barrier/s to progress/learning:   [x]   None  []   Cognition  []   Hearing deficit  []   Pre-morbid mental/psychological status   []   Motivation  []   Communication  []   Anxiety  []   Vision deficit  []   Attention  []   Other:      Safety:       []  bed alarm set    [x]  chair alarm set    []  Pt refused alarms                []  Telesitter activated      [x]  Gait belt used during tx session      []other:         Number of Minutes/Billable Intervention  Gait Training 30   Therapeutic Exercise 45   Neuro Re-Ed    Therapeutic Activity 45   Wheelchair Propulsion    Group    Other:    TOTAL 120         Social History  Social/Functional History  Lives With: Family (Brother (Tevin, retired))  Type of Home: House  Home Layout: One level (1 step into/out of rest of house from bedroom)  Home Access: Stairs to enter with rails  Entrance Stairs - Number of Steps: 3 ALEXSANDRA  Entrance Stairs - Rails: Both (can reach both simultaneously)  Bathroom Shower/Tub: Tub/Shower unit  Bathroom Toilet: Standard  Bathroom Equipment: Grab bars in shower, Grab bars around toilet  Home Equipment: Cane, Walker - Standard, Reacher, Sock aid, Long-handled shoehorn, Leg   Has the patient had two or more falls in the past year or any fall with injury in the past year?: Yes (1 fall resulting in L hip fx and this hospitalization)  Prior Level of Assist for ADLs: Independent  Prior Level of Assist for Homemaking: Independent  Homemaking Responsibilities: Yes  Meal Prep Responsibility: No  Laundry Responsibility: Primary  Cleaning Responsibility: Secondary (lighter cleaning)  Bill Paying/Finance Responsibility: Secondary  Shopping Responsibility: Secondary  Health Care Management: Primary (uses pillbox)  Prior Level of Assist for Ambulation: Independent household ambulator, with or without device, Independent community ambulator, with or without device (does not use AD at  gi)  Prior Level of Assist for Transfers: Independent  Active : No  Patient's  Info: hasn't driven since CVA  Mode of Transportation: Car  Occupation: On disability  Additional Comments: Pt typically sleeps in a flat, regular bed at home    Objective                                                                                    Goals:  (Update in navigator)  Short Term Goals  Time Frame for Short Term Goals: 7 days STG=LTG  Short Term Goal 1: Pt will complete all bed mobility with mod I  Short Term Goal 2: Pt will complete all functional transfers with mod I  Short Term Goal 3: Pt will ambulate with 2ww on level surface 150' and 10' on unlevel surface with mod I  Short Term Goal 4: Pt will negotiate curb step with 2ww and 4 steps with B rails with mod I, 12 steps with B rails with supervision  Short Term Goal 5: Pt will  object from floor with 2ww and reacher with mod I:   :        Plan of Care                                                                              Times per week: 5 days per week for a minimum of 60 minutes/day plus group as appropriate for 60 minutes.  Treatment to include Current Treatment Recommendations: Strengthening, ROM, Balance training, Functional mobility training, Transfer training, Endurance training, IADL training, Stair training, Gait training, Neuromuscular re-education, Manual, Patient/Caregiver education & training, Pain management, Safety education & training, Home exercise program, Equipment evaluation, education, & procurement, Positioning, Modalities, Group Therapy, Therapeutic activities    Electronically signed by   Suzan Mata PT,  12/22/2024, 8:16 AM

## 2024-12-22 NOTE — PROGRESS NOTES
Physical Rehabilitation: OCCUPATIONAL THERAPY     [x] daily progress note       [] discharge       Patient Name:  Brent Degroot   :  1960 MRN: [id number]  Room:  [address] [place], [place] [address] Date of Admission: 2024  Rehabilitation Diagnosis:   Fracture of unspecified part of neck of left femur, initial encounter for closed fracture [S72.002A]  Closed intertrochanteric fracture of hip, left, initial encounter ([facility]) [S72.142A]       Date 2024       Day of ARU Week:  4   Time IN/OUT 3405-9208   Individual Tx Minutes 60   Group Tx Minutes    Co-Treat Minutes    Concurrent Tx Minutes    TOTAL Tx Time Mins 60   Variance Time    Variance Reason []   Refusal due to:     []   Medical hold/reason:    []   Illness   []   Off Unit for test/procedure  []   Extra time needed to complete task  []   Therapeutic need  []   Make up mins were attempted but pt unable to complete due to (specify)  []   Other (specify):   Restrictions Restrictions/Precautions: General Precautions, Fall Risk, Weight Bearing (WBAT LLE, IV RUE)         Communication with other providers: [x]   OK to see per nursing:     []   Spoke with team member regarding:      Subjective observations and cognitive status: Pt sitting in recliner chair upon arrival and agreeable to therapy.     Pain level/location:   8 /10       Location: hip while walking   Discharge recommendations  Anticipated discharge date:  TBD  Destination: []home alone   []home alone w assist prn   [] home w/ family    [] Continuous supervision       []SNF    [] Assisted living     [] Other:   Continued therapy: []HHC OT  []OUTPATIENT  OT   [] No Further OT  Equipment needs: TBD     ADLs:    Bed Mobility:           [x]   Pt received out of bed     Transfers:    Sit--> Stand:  SBA  Stand --> Sit:   SBA  Assistive device required for transfer:   RW    Functional Mobility:    Assistance:  SBA-CGA  Device:   [x]   Rolling Walker     []   Standard Walker []   Wheelchair        []   Cane       []

## 2024-12-23 PROCEDURE — 1280000000 HC REHAB R&B

## 2024-12-23 PROCEDURE — 6360000002 HC RX W HCPCS: Performed by: NURSE PRACTITIONER

## 2024-12-23 PROCEDURE — 97530 THERAPEUTIC ACTIVITIES: CPT

## 2024-12-23 PROCEDURE — 97110 THERAPEUTIC EXERCISES: CPT

## 2024-12-23 PROCEDURE — 97116 GAIT TRAINING THERAPY: CPT

## 2024-12-23 PROCEDURE — 97535 SELF CARE MNGMENT TRAINING: CPT

## 2024-12-23 PROCEDURE — 6370000000 HC RX 637 (ALT 250 FOR IP): Performed by: NURSE PRACTITIONER

## 2024-12-23 PROCEDURE — 99232 SBSQ HOSP IP/OBS MODERATE 35: CPT | Performed by: PHYSICAL MEDICINE & REHABILITATION

## 2024-12-23 PROCEDURE — 94761 N-INVAS EAR/PLS OXIMETRY MLT: CPT

## 2024-12-23 PROCEDURE — 6370000000 HC RX 637 (ALT 250 FOR IP): Performed by: PHYSICAL MEDICINE & REHABILITATION

## 2024-12-23 RX ADMIN — Medication 1000 MCG: at 20:17

## 2024-12-23 RX ADMIN — LACOSAMIDE 200 MG: 100 TABLET, FILM COATED ORAL at 10:12

## 2024-12-23 RX ADMIN — FAMOTIDINE 20 MG: 20 TABLET ORAL at 10:13

## 2024-12-23 RX ADMIN — DIVALPROEX SODIUM 500 MG: 500 TABLET, DELAYED RELEASE ORAL at 17:16

## 2024-12-23 RX ADMIN — DONEPEZIL HYDROCHLORIDE 10 MG: 10 TABLET ORAL at 20:17

## 2024-12-23 RX ADMIN — ACETAMINOPHEN 650 MG: 325 TABLET ORAL at 10:11

## 2024-12-23 RX ADMIN — CLOPIDOGREL BISULFATE 75 MG: 75 TABLET ORAL at 10:13

## 2024-12-23 RX ADMIN — DIVALPROEX SODIUM 500 MG: 500 TABLET, DELAYED RELEASE ORAL at 10:19

## 2024-12-23 RX ADMIN — ACETAMINOPHEN 650 MG: 325 TABLET ORAL at 20:17

## 2024-12-23 RX ADMIN — Medication 100 MG: at 10:21

## 2024-12-23 RX ADMIN — SERTRALINE HYDROCHLORIDE 200 MG: 50 TABLET ORAL at 10:12

## 2024-12-23 RX ADMIN — LACOSAMIDE 200 MG: 100 TABLET, FILM COATED ORAL at 20:16

## 2024-12-23 RX ADMIN — Medication 1000 MCG: at 10:12

## 2024-12-23 RX ADMIN — SENNOSIDES AND DOCUSATE SODIUM 1 TABLET: 50; 8.6 TABLET ORAL at 10:12

## 2024-12-23 RX ADMIN — ATORVASTATIN CALCIUM 40 MG: 40 TABLET, FILM COATED ORAL at 20:17

## 2024-12-23 RX ADMIN — ACETAMINOPHEN 650 MG: 325 TABLET ORAL at 14:24

## 2024-12-23 RX ADMIN — MEMANTINE 10 MG: 10 TABLET ORAL at 20:17

## 2024-12-23 RX ADMIN — ACETAMINOPHEN 650 MG: 325 TABLET ORAL at 03:29

## 2024-12-23 RX ADMIN — FOLIC ACID 0.5 MG: 1 TABLET ORAL at 10:12

## 2024-12-23 RX ADMIN — OXYCODONE HYDROCHLORIDE 5 MG: 5 TABLET ORAL at 05:55

## 2024-12-23 RX ADMIN — MEMANTINE 10 MG: 10 TABLET ORAL at 10:12

## 2024-12-23 RX ADMIN — MELATONIN TAB 3 MG 6 MG: 3 TAB at 20:17

## 2024-12-23 RX ADMIN — ENOXAPARIN SODIUM 30 MG: 100 INJECTION SUBCUTANEOUS at 17:16

## 2024-12-23 RX ADMIN — LEVETIRACETAM 1500 MG: 500 TABLET, FILM COATED ORAL at 10:11

## 2024-12-23 RX ADMIN — OXYCODONE HYDROCHLORIDE 10 MG: 10 TABLET ORAL at 20:19

## 2024-12-23 RX ADMIN — SENNOSIDES AND DOCUSATE SODIUM 1 TABLET: 50; 8.6 TABLET ORAL at 20:17

## 2024-12-23 RX ADMIN — FAMOTIDINE 20 MG: 20 TABLET ORAL at 20:17

## 2024-12-23 RX ADMIN — LEVETIRACETAM 1500 MG: 500 TABLET, FILM COATED ORAL at 20:16

## 2024-12-23 ASSESSMENT — PAIN SCALES - GENERAL
PAINLEVEL_OUTOF10: 9
PAINLEVEL_OUTOF10: 5
PAINLEVEL_OUTOF10: 5
PAINLEVEL_OUTOF10: 0
PAINLEVEL_OUTOF10: 6
PAINLEVEL_OUTOF10: 0

## 2024-12-23 ASSESSMENT — PAIN DESCRIPTION - ONSET: ONSET: ON-GOING

## 2024-12-23 ASSESSMENT — PAIN - FUNCTIONAL ASSESSMENT
PAIN_FUNCTIONAL_ASSESSMENT: ACTIVITIES ARE NOT PREVENTED

## 2024-12-23 ASSESSMENT — PAIN SCALES - WONG BAKER
WONGBAKER_NUMERICALRESPONSE: NO HURT
WONGBAKER_NUMERICALRESPONSE: NO HURT

## 2024-12-23 ASSESSMENT — PAIN DESCRIPTION - LOCATION
LOCATION: HIP

## 2024-12-23 ASSESSMENT — PAIN DESCRIPTION - ORIENTATION
ORIENTATION: RIGHT
ORIENTATION: LEFT
ORIENTATION: LEFT

## 2024-12-23 ASSESSMENT — PAIN DESCRIPTION - PAIN TYPE: TYPE: SURGICAL PAIN

## 2024-12-23 ASSESSMENT — PAIN DESCRIPTION - DESCRIPTORS
DESCRIPTORS: ACHING

## 2024-12-23 ASSESSMENT — PAIN DESCRIPTION - FREQUENCY: FREQUENCY: CONTINUOUS

## 2024-12-23 NOTE — PLAN OF CARE
Problem: Chronic Conditions and Co-morbidities  Goal: Patient's chronic conditions and co-morbidity symptoms are monitored and maintained or improved  Outcome: Progressing     Problem: Discharge Planning  Goal: Discharge to home or other facility with appropriate resources  Outcome: Progressing     Problem: Safety - Adult  Goal: Free from fall injury  Outcome: Progressing     Problem: Pain  Goal: Verbalizes/displays adequate comfort level or baseline comfort level  Outcome: Progressing     Problem: ABCDS Injury Assessment  Goal: Absence of physical injury  Outcome: Progressing     Problem: Nutrition Deficit:  Goal: Optimize nutritional status  Outcome: Progressing

## 2024-12-23 NOTE — PROGRESS NOTES
Physical Therapy      [x] daily progress note       [] discharge       Patient Name:  Brent Degroot   :  1960 MRN: [id number]  Room:  [address] [place] [address] Date of Admission: 2024  Rehabilitation Diagnosis:   Fracture of unspecified part of neck of left femur, initial encounter for closed fracture [S72.002A]  Closed intertrochanteric fracture of hip, left, initial encounter ([facility]) [S72.142A]       Date 2024       Day of ARU Week:  5   Time IN/OUT 3441-7286   Individual Tx Minutes 60   TOTAL Tx Time Mins 60   Variance Time    Variance Time []   Refusal due to:     []   Medical hold/reason:    []   Illness   []   Off Unit for test/procedure  []   Extra time needed to complete task  []   Therapeutic need  []   Other (specify):   Restrictions Restrictions/Precautions  Restrictions/Precautions: General Precautions, Fall Risk, Weight Bearing (WBAT LLE, IV RUE)      Communication with other providers: [x]   OK to see per nursing:     []   Spoke with team member regarding:      Subjective observations and cognitive status: Pt sitting EOB eating breakfast, willing to partcipate     Pain level/location:  8  /10       Location: L hip radiating down to knee, meds received earlier this AM   Discharge recommendations  Anticipated discharge date:  tbd  Destination: []home alone   []home alone with assist PRN     [x] home w/ family      [] Continuous supervision  []SNF    [] Assisted living     [] Other:  Continued therapy: [x]HHC PT  []OUTPATIENT  PT   [] No Further PT  []SNF PT  Caregiver training recommended: []Yes  [] No   Equipment needs: RW  Brent Degroot requires the assistance of a wheeled walker to successfully ambulate from room to room at home to allow completion of daily living tasks such as: bathing, toileting, dressing and grooming.  A wheeled walker is necessary due to the patient's unsteady gait, upper body weakness, inability to  a standard walker.  This patient can ambulate only by pushing a  re-education, Manual, Patient/Caregiver education & training, Pain management, Safety education & training, Home exercise program, Equipment evaluation, education, & procurement, Positioning, Modalities, Group Therapy, Therapeutic activities    Electronically signed by   Cat Wilde, PTA #[id number]  12/23/2024, 12:33 PM

## 2024-12-23 NOTE — PATIENT CARE CONFERENCE
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] 2024    Acute blood loss anemia [D62] 2024    Dysthymia [F34.1] 2024    Closed intertrochanteric fracture of hip, left, initial encounter ([facility]) [S72.142A] 2024    Seizure disorder ([facility]) [G40.909] 10/16/2018     Date: 2024    CASE MANAGEMENT  Current issues/needs regarding patient and family discharge status: 1-level, 3 ALEXSANDRA, 1 step inside, Home DME: standard walker, cane    Brent Degroot requires the assistance of a wheeled walker to successfully ambulate from room to room at home to allow completion of daily living tasks such as: bathing, toileting, dressing and grooming.  A wheeled walker is necessary due to the patient's unsteady gait, upper body weakness, inability to  a standard walker.  This patient can ambulate only by pushing a walker instead of using a lesser assistive device such as a cane or crutch.     Brent Degroot requires the assistance of a tub transfer bench to successfully and safely complete bathing activities necessary due to reduced balance, endurance, need for ADL assist, and LE weakness and reduced ROM. These tasks cannot be safely completed without this device and would place Brent Degroot at greater risk of falls.      Brent Degroot requires the assistance of a tub transfer bench to successfully and safely complete bathing activities necessary  Dressing  Assistance Needed: Partial/moderate assistance  Comment: able to doff most of shirt; required some assist to manage sleeve over IV line. Pt threaded BUEs into shirt, however, then got disoriented and required cues for orientation of shirt  CARE Score: 3  Discharge Goal: Independent         LB Dressing: Lower Body Dressing  Assistance Needed: Supervision or touching assistance  Comment: Pt required increased time to doff underwear and pants from LLE using dressing stick. Pt able to thread BLEs into underwear and pants, pt required cues for safety as patient began to stand up without locking brakes. Pt was able to stand and manage pants up over hips c CGA and cues to have 1 UE on countertop for support.  CARE Score: 4  Discharge Goal: Independent    Donning and [place] Footwear: Putting On/Taking Off Footwear  Assistance Needed: Partial/moderate assistance  Comment: Pt able to doff both socks and shoes using dressing stick with increased time and cues. Pt able to don R sock and shoe. Introduced sock aide. pt able to don socks using sock aide but required assist to don L shoe.  CARE Score: 3  Discharge Goal: Independent      Toilet Transfers:   Toilet Transfer  Assistance needed: Partial/moderate assistance  Comment: min A for balance during turn as pt demo'ed poor technique, performing extreme pivots on RLE with LLE in air, waiting too long to turn RW with body  CARE Score: 3  Discharge Goal: Independent      Impairments/deficits, barriers:  Decreased balance, endurance; perceptual, coordination and cognitive deficits from CVA  Assessment  Performance deficits / Impairments: Decreased functional mobility , Decreased ADL status, Decreased safe awareness, Decreased cognition, Decreased endurance, Decreased sensation, Decreased balance, Decreased vision/visual deficit, Decreased high-level IADLs, Decreased coordination, Decreased posture  Decision Making: High Complexity  Equipment needed at discharge:

## 2024-12-23 NOTE — PROGRESS NOTES
ROM. These tasks cannot be safely completed without this device and would place Brent Degroot at greater risk of falls.        Brent Degroot requires the assistance of a tub transfer bench to successfully and safely complete bathing activities necessary due to hip fracture reducing balance, endurance ROM. These tasks cannot be safely completed without this device and would place Brent Degroot at greater risk of falls.         ADLs:    Eating: Eating  Assistance Needed: Independent  Comment: X  CARE Score: 6  Discharge Goal: Independent       Oral Hygiene: Oral Hygiene  Assistance Needed: Independent  Comment: seated at sink  CARE Score: 6  Discharge Goal: Independent    UB/LB Bathing: Shower/Bathe Self  Assistance Needed: Supervision or touching assistance  Comment: Performed full shower seated, used LHS to wash LLE  CARE Score: 4  Discharge Goal: Independent    UB Dressing: Upper Body Dressing  Assistance Needed: Partial/moderate assistance  Comment: able to doff most of shirt; required some assist to manage sleeve over IV line. Pt threaded BUEs into shirt, however, then got disoriented and required cues for orientation of shirt  CARE Score: 3  Discharge Goal: Independent         LB Dressing: Lower Body Dressing  Assistance Needed: Supervision or touching assistance  Comment: Pt required increased time to doff underwear and pants from LLE using dressing stick. Pt able to thread BLEs into underwear and pants, pt required cues for safety as patient began to stand up without locking brakes. Pt was able to stand and manage pants up over hips c CGA and cues to have 1 UE on countertop for support.  CARE Score: 4  Discharge Goal: Independent    Donning and [place] Footwear: Putting On/Taking Off Footwear  Assistance Needed: Partial/moderate assistance  Comment: Pt able to doff both socks and shoes using dressing stick with increased time and cues. Pt able to don R sock and shoe. Introduced sock aide. pt able to don  : No  Patient's  Info: hasn't driven since CVA  Mode of Transportation: Car  Occupation: On disability  Additional Comments: Pt typically sleeps in a flat, regular bed at home    Objective                                                                                    Goals:  (Update in navigator)  Short Term Goals  Time Frame for Short Term Goals: STGs=LTGs:  Long Term Goals  Time Frame for Long Term Goals : 10 days or until d/c  Long Term Goal 1: Pt will complete grooming tasks Ind  Long Term Goal 2: Pt will complete total body bathing mod I  Long Term Goal 3: Pt will complete UB dressing Ind  Long Term Goal 4: Pt will complete LB dressing mod I using AE PRN  Long Term Goal 5: Pt will doff/don footwear mod I using AE PRN  Additional Goals?: Yes  Long Term Goal 6: Pt will complete toileting mod I  Long Term Goal 7: Pt will complete functional transfers (bed, chair, toilet, shower) c DME PRN and mod I  Long Term Goal 8: Pt will perform therex/therax to facilitate increased strength/endurance/ax tolerance (c emphasis on dynamic standing balance/tolerance >8 mins, BUE endurance) c SBA  Long Term Goal 9: Pt will complete simple homemaking tasks c DME PRN and S:        Plan of Care                                                                              Times per week: 5 days per week for a minimum of 60 minutes/day plus group as appropriate for 60 minutes.  Treatment to include Occupational Therapy Plan  Current Treatment Recommendations: Balance training, Functional mobility training, Endurance training, Pain management, Safety education & training, Patient/Caregiver education & training, Equipment evaluation, education, & procurement, Positioning, Self-Care / ADL, Home management training, Group Therapy    Electronically signed by   GRETCHEN Ortiz,  12/23/2024, 12:51 PM

## 2024-12-24 PROCEDURE — 97116 GAIT TRAINING THERAPY: CPT

## 2024-12-24 PROCEDURE — 6360000002 HC RX W HCPCS: Performed by: NURSE PRACTITIONER

## 2024-12-24 PROCEDURE — 1280000000 HC REHAB R&B

## 2024-12-24 PROCEDURE — 97530 THERAPEUTIC ACTIVITIES: CPT

## 2024-12-24 PROCEDURE — 6370000000 HC RX 637 (ALT 250 FOR IP): Performed by: PHYSICAL MEDICINE & REHABILITATION

## 2024-12-24 PROCEDURE — 94761 N-INVAS EAR/PLS OXIMETRY MLT: CPT

## 2024-12-24 PROCEDURE — 97110 THERAPEUTIC EXERCISES: CPT

## 2024-12-24 PROCEDURE — 6370000000 HC RX 637 (ALT 250 FOR IP): Performed by: NURSE PRACTITIONER

## 2024-12-24 RX ORDER — IBUPROFEN 400 MG/1
400 TABLET, FILM COATED ORAL 2 TIMES DAILY WITH MEALS
Status: COMPLETED | OUTPATIENT
Start: 2024-12-24 | End: 2024-12-28

## 2024-12-24 RX ADMIN — ACETAMINOPHEN 650 MG: 325 TABLET ORAL at 22:00

## 2024-12-24 RX ADMIN — SERTRALINE HYDROCHLORIDE 200 MG: 50 TABLET ORAL at 10:31

## 2024-12-24 RX ADMIN — ATORVASTATIN CALCIUM 40 MG: 40 TABLET, FILM COATED ORAL at 22:00

## 2024-12-24 RX ADMIN — LEVETIRACETAM 1500 MG: 500 TABLET, FILM COATED ORAL at 21:57

## 2024-12-24 RX ADMIN — MEMANTINE 10 MG: 10 TABLET ORAL at 22:00

## 2024-12-24 RX ADMIN — LEVETIRACETAM 1500 MG: 500 TABLET, FILM COATED ORAL at 10:31

## 2024-12-24 RX ADMIN — MELATONIN TAB 3 MG 6 MG: 3 TAB at 22:00

## 2024-12-24 RX ADMIN — LACOSAMIDE 200 MG: 100 TABLET, FILM COATED ORAL at 22:00

## 2024-12-24 RX ADMIN — DIVALPROEX SODIUM 500 MG: 500 TABLET, DELAYED RELEASE ORAL at 17:34

## 2024-12-24 RX ADMIN — Medication 100 MG: at 10:38

## 2024-12-24 RX ADMIN — FAMOTIDINE 20 MG: 20 TABLET ORAL at 22:00

## 2024-12-24 RX ADMIN — DONEPEZIL HYDROCHLORIDE 10 MG: 10 TABLET ORAL at 22:00

## 2024-12-24 RX ADMIN — FAMOTIDINE 20 MG: 20 TABLET ORAL at 10:36

## 2024-12-24 RX ADMIN — OXYCODONE HYDROCHLORIDE 5 MG: 5 TABLET ORAL at 10:36

## 2024-12-24 RX ADMIN — SENNOSIDES AND DOCUSATE SODIUM 1 TABLET: 50; 8.6 TABLET ORAL at 10:37

## 2024-12-24 RX ADMIN — OXYCODONE HYDROCHLORIDE 10 MG: 10 TABLET ORAL at 21:57

## 2024-12-24 RX ADMIN — IBUPROFEN 400 MG: 400 TABLET, FILM COATED ORAL at 17:34

## 2024-12-24 RX ADMIN — ACETAMINOPHEN 650 MG: 325 TABLET ORAL at 14:11

## 2024-12-24 RX ADMIN — MEMANTINE 10 MG: 10 TABLET ORAL at 10:36

## 2024-12-24 RX ADMIN — LACOSAMIDE 200 MG: 100 TABLET, FILM COATED ORAL at 10:31

## 2024-12-24 RX ADMIN — Medication 1000 MCG: at 22:00

## 2024-12-24 RX ADMIN — SENNOSIDES AND DOCUSATE SODIUM 1 TABLET: 50; 8.6 TABLET ORAL at 22:00

## 2024-12-24 RX ADMIN — FOLIC ACID 0.5 MG: 1 TABLET ORAL at 10:37

## 2024-12-24 RX ADMIN — ACETAMINOPHEN 650 MG: 325 TABLET ORAL at 02:04

## 2024-12-24 RX ADMIN — DIVALPROEX SODIUM 500 MG: 500 TABLET, DELAYED RELEASE ORAL at 10:38

## 2024-12-24 RX ADMIN — Medication 1000 MCG: at 10:36

## 2024-12-24 RX ADMIN — ENOXAPARIN SODIUM 30 MG: 100 INJECTION SUBCUTANEOUS at 17:34

## 2024-12-24 RX ADMIN — OXYCODONE HYDROCHLORIDE 5 MG: 5 TABLET ORAL at 06:37

## 2024-12-24 RX ADMIN — CLOPIDOGREL BISULFATE 75 MG: 75 TABLET ORAL at 10:37

## 2024-12-24 ASSESSMENT — PAIN DESCRIPTION - LOCATION
LOCATION: HIP
LOCATION: LEG;HIP
LOCATION: HIP

## 2024-12-24 ASSESSMENT — PAIN DESCRIPTION - DESCRIPTORS
DESCRIPTORS: ACHING;THROBBING
DESCRIPTORS: DISCOMFORT;DULL;HEAVINESS
DESCRIPTORS: ACHING
DESCRIPTORS: DISCOMFORT;DULL
DESCRIPTORS: DULL;DISCOMFORT

## 2024-12-24 ASSESSMENT — PAIN SCALES - GENERAL
PAINLEVEL_OUTOF10: 4
PAINLEVEL_OUTOF10: 7
PAINLEVEL_OUTOF10: 6
PAINLEVEL_OUTOF10: 5
PAINLEVEL_OUTOF10: 3
PAINLEVEL_OUTOF10: 3
PAINLEVEL_OUTOF10: 5

## 2024-12-24 ASSESSMENT — PAIN - FUNCTIONAL ASSESSMENT
PAIN_FUNCTIONAL_ASSESSMENT: ACTIVITIES ARE NOT PREVENTED

## 2024-12-24 ASSESSMENT — PAIN DESCRIPTION - ORIENTATION
ORIENTATION: LEFT

## 2024-12-24 NOTE — PROGRESS NOTES
Physician Progress Note      PATIENT:               SUMMER BESS  CSN #:                  [id number]  :                       1960  ADMIT DATE:       2024 6:27 PM  DISCH DATE:        2024 4:52 PM  RESPONDING  PROVIDER #:        SUHA Callahan CNP          QUERY TEXT:    Pt admitted with Left hip intertrochanteric femur fracture and has anemia   documented in DS on . If possible, please document in progress notes and   discharge summary further specificity regarding the acuity and type of anemia:    The medical record reflects the following:  Risk Factors: Estimated blood loss-100ml,S/P Left hip intramedullary nail  Clinical Indicators: DS on   Acute anemia, postop and dilutional - stable  on  Hb/Hct-7.4/23.0  on  Hb/Hct-6.1/22.1  on  Hb/Hct-7.7/22.6  on  Hb/Hct-8.7/21.3  on 12/15 Hb/Hct-7.1/21.9    Treatment: Blood Transfusion ,Lab value    Thank you,  Lluvia S CDS  Options provided:  -- Anemia due to postoperative blood loss  -- Other - I will add my own diagnosis  -- Disagree - Not applicable / Not valid  -- Disagree - Clinically unable to determine / Unknown  -- Refer to Clinical Documentation Reviewer    PROVIDER RESPONSE TEXT:    This patient has anemia due to postoperative blood loss.    Query created by: Lluvia Shin on 2024 8:45 AM      Electronically signed by:  SUHA Callahan CNP 2024 2:43 PM

## 2024-12-24 NOTE — PLAN OF CARE
Problem: Safety - Adult  Goal: Free from fall injury  12/24/2024 1055 by Luke Nelson RN  Outcome: Progressing  12/24/2024 0130 by Deng Norwood LPN  Outcome: Progressing     Problem: Pain  Goal: Verbalizes/displays adequate comfort level or baseline comfort level  12/24/2024 0130 by Deng Norwood LPN  Outcome: Progressing

## 2024-12-24 NOTE — PROGRESS NOTES
up object from floor with 2ww and reacher with mod I            Bed Mobility:   Sit to Lying  Assistance Needed: Supervision or touching assistance  Comment: Pt self assisted LLE into bed with RLE  CARE Score: 4  Discharge Goal: Independent  Roll Left and Right  Comment: with rail to R, but could not tolerate to L due to hip pain  Discharge Goal: Independent  Lying to Sitting on Side of Bed  Assistance Needed: Independent  Comment: with rail  CARE Score: 6  Discharge Goal: Independent    Transfers:    Sit to Stand  Assistance Needed: Supervision or touching assistance  Comment: SBA with RW  CARE Score: 4  Discharge Goal: Independent  Chair/Bed-to-Chair Transfer  Assistance Needed: Supervision or touching assistance  Comment: SBA with RW  CARE Score: 4  Discharge Goal: Independent     Car Transfer  Assistance Needed: Supervision or touching assistance  Comment: SBA with RW for approach, demos safety in/out of car, self assists L LE in/out  CARE Score: 4  Discharge Goal: Independent    Ambulation:    Walking Ability  Does the Patient Walk?: Yes     Walk 10 Feet  Assistance Needed: Supervision or touching assistance  Comment: SBA with RW, seq cues  CARE Score: 4  Discharge Goal: Independent     Walk 50 Feet with Two Turns  Assistance Needed: Supervision or touching assistance  Comment: SBA with RW, min seq cues  CARE Score: 4  Discharge Goal: Independent     Walk 150 Feet  Assistance Needed: Supervision or touching assistance  Comment: SBA with RW, able to amb to pre-measured distance, 2 short standing rest breaks, min seq cues  CARE Score: 4  Discharge Goal: Independent     Walking 10 Feet on Uneven Surfaces  Assistance Needed: Supervision or touching assistance  Comment: SBA with RW, demos safety after visual/verbal cues  CARE Score: 4  Discharge Goal: Independent     1 Step (Curb)  Assistance Needed: Supervision or touching assistance  Comment: SB-CGA with RW, min seq cues  CARE Score: 4  Discharge Goal:  assistance for transfers.  DVT prophylaxis: Continuing Lovenox 30 mg subcu nightly.  This raises his risk for spontaneous hemorrhage and GI bleeding.  However, his limited mobility justifies its use.  Continue monitoring his hemoglobin and platelet count regularly.  Holding off with the H2 blocker.  Using a walker to go household distances with cueing and minimum physical assistance.  No new bruising or swelling.    Seizure disorder: Continuing his Keppra, Vimpat and Depakote which are chronic medications for him.  Extending his treatment into the community settings to work on divergent thinking tasks.   Minimizing exhaustion.  Monitoring him for breakthrough seizures or mental status changes with the recent anesthetic exposure.  Uncontrolled pain: Continuing the scheduled acetaminophen 4 times daily.  Providing oxycodone for breakthrough pain on an as needed basis.  Emphasizing cryotherapy and limb elevation strategies. Adjusting bowel intervention while on the analgesics.  Vascular dementia: Continuing Namenda and Aricept as his poor insight and reasoning to impact our training of him.  Providing lots of repetition of our training instructions.  Providing written and verbal instructions when possible.  Involving his brother in the rehab process as much as possible.  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  Monitoring his blood pressure.  Dysthymia: His endurance is slowly building.  Continuing Zoloft at high dose.  Cautious modification of sleep patterns with melatonin.  Continue the B12, folic acid and B6 supplements.

## 2024-12-24 NOTE — PROGRESS NOTES
Occupational Therapy    Physical Rehabilitation: OCCUPATIONAL THERAPY     [x] daily progress note       [] discharge       Patient Name:  Bernt Degroot   :  1960 MRN: [id number]  Room:  [address] [place] [address] Date of Admission: 2024  Rehabilitation Diagnosis:   Fracture of unspecified part of neck of left femur, initial encounter for closed fracture [S72.002A]  Closed intertrochanteric fracture of hip, left, initial encounter ([facility]) [S72.142A]       Date 2024       Day of ARU Week:  6   Time IN/OUT 1405/1505   Individual Tx Minutes 60   Group Tx Minutes    Co-Treat Minutes    Concurrent Tx Minutes    TOTAL Tx Time Mins 60   Variance Time    Variance Reason []   Refusal due to:     []   Medical hold/reason:    []   Illness   []   Off Unit for test/procedure  []   Extra time needed to complete task  []   Therapeutic need  []   Make up mins were attempted but pt unable to complete due to (specify)  []   Other (specify):   Restrictions Restrictions/Precautions: General Precautions, Fall Risk, Weight Bearing (WBAT LLE, IV RUE)         Communication with other providers: [x]   OK to see per nursing:     []   Spoke with team member regarding:      Subjective observations and cognitive status: Pt in recliner on approach, reporting pain from recent PT session but agreeable to tx session. Received Tylenol at beginning of session and had ice pack on hip majority of treatment.      Pain level/location:   9 /10       Location: L hip   Discharge recommendations  Anticipated discharge date:    Destination: []home alone   []home alone w assist prn   [] home w/ family    [x] Continuous supervision       []SNF    [] Assisted living     [] Other:   Continued therapy: [x]HHC OT  []OUTPATIENT  OT   [] No Further OT  Equipment needs: Brent Degroot requires the assistance of a tub transfer bench to successfully and safely complete bathing activities necessary due to reduced balance, endurance, need for ADL assist, and LE  Standard  Bathroom Equipment: Grab bars in shower, Grab bars around toilet  Home Equipment: Cane, Walker - Standard, Reacher, Sock aid, Long-handled shoehorn, Leg   Has the patient had two or more falls in the past year or any fall with injury in the past year?: Yes (1 fall resulting in L hip fx and this hospitalization)  Prior Level of Assist for ADLs: Independent  Prior Level of Assist for Homemaking: Independent  Homemaking Responsibilities: Yes  Meal Prep Responsibility: No  Laundry Responsibility: Primary  Cleaning Responsibility: Secondary (lighter cleaning)  Bill Paying/Finance Responsibility: Secondary  Shopping Responsibility: Secondary  Health Care Management: Primary (uses pillbox)  Prior Level of Assist for Ambulation: Independent household ambulator, with or without device, Independent community ambulator, with or without device (does not use AD at [facility])  Prior Level of Assist for Transfers: Independent  Active : No  Patient's  Info: hasn't driven since CVA  Mode of Transportation: Car  Occupation: On disability  Additional Comments: Pt typically sleeps in a flat, regular bed at home    Objective                                                                                    Goals:  (Update in navigator)  Short Term Goals  Time Frame for Short Term Goals: STGs=LTGs:  Long Term Goals  Time Frame for Long Term Goals : 10 days or until d/c  Long Term Goal 1: Pt will complete grooming tasks Ind  Long Term Goal 2: Pt will complete total body bathing mod I  Long Term Goal 3: Pt will complete UB dressing Ind  Long Term Goal 4: Pt will complete LB dressing mod I using AE PRN  Long Term Goal 5: Pt will doff/don footwear mod I using AE PRN  Additional Goals?: Yes  Long Term Goal 6: Pt will complete toileting mod I  Long Term Goal 7: Pt will complete functional transfers (bed, chair, toilet, shower) c DME PRN and mod I  Long Term Goal 8: Pt will perform therex/therax to facilitate increased

## 2024-12-24 NOTE — PROGRESS NOTES
Physical Therapy      [x] daily progress note       [] discharge       Patient Name:  Brent Degroot   :  1960 MRN: [id number]  Room:  [address] [place] [address] Date of Admission: 2024  Rehabilitation Diagnosis:   Fracture of unspecified part of neck of left femur, initial encounter for closed fracture [S72.002A]  Closed intertrochanteric fracture of hip, left, initial encounter ([facility]) [S72.142A]       Date 2024       Day of ARU Week:  6   Time IN/OUT 9029-8162  5614-0649   Individual Tx Minutes 62+58   TOTAL Tx Time Mins 120   Variance Time    Variance Time []   Refusal due to:     []   Medical hold/reason:    []   Illness   []   Off Unit for test/procedure  []   Extra time needed to complete task  []   Therapeutic need  []   Other (specify):   Restrictions Restrictions/Precautions  Restrictions/Precautions: General Precautions, Fall Risk, Weight Bearing (WBAT LLE, IV RUE)      Communication with other providers: [x]   OK to see per nursing:     []   Spoke with team member regarding:      Subjective observations and cognitive status: Pt up in recliner, willing to participate; When distracted during tasks, pt req safety cues.   PM: Pt just finished with lunch, motivated toward session. Pt reports spilling urinal on clothing, wanting to change pants before leaving room.      Pain level/location: 8/10       Location: L Hip; meds received   Discharge recommendations  Anticipated discharge date:  tbd  Destination: []home alone   []home alone with assist PRN     [x] home w/ family      [] Continuous supervision  []SNF    [] Assisted living     [] Other:  Continued therapy: [x]HHC PT  []OUTPATIENT  PT   [] No Further PT  []SNF PT  Caregiver training recommended: []Yes  [] No   Equipment needs: RW  Brent Degroot requires the assistance of a wheeled walker to successfully ambulate from room to room at home to allow completion of daily living tasks such as: bathing, toileting, dressing and grooming.  A wheeled walker      []  Standing        []   Balance training         []   Postural training    [x]   Supine ther ex (reps/sets):  BLEs  Supine:  Ankle Pumps x 20  Quad Sets x 20  Gluteal Sets x 20  Heel Slides x 15 AA-AROM  Short Arc Quads x 15 AA-AROM  Hip Abduction x 15 AA-AROM  Sitting:  Long Arc Quads x 15 AA-AROM   Seated Marching x 10 AAROM  min cues req handout   []   Seated ther ex (reps/sets):     []   Standing ther ex (reps/sets):     []   Picked up object from floor                        []   Other:   []   Other:   [x]   Other:Practiced side stepping through small spaces with RW, SB-close supervision,demos safety after initial ed      Patient/Caregiver Education and Training:   [x]    Role of PT  [x]   Education about Dx  [x]   Use of call light for assist   [x]   HEP provided and explained   [x]   Treatment plan reviewed  [x]   Home safety  []   Wheelchair mobility/management   []   Body mechanics  [x]   Bed Mobility/Transfer technique  [x]   Gait technique/sequencing  [x]   Proper use of assistive device/adaptive equipment  [x]   Stair training/Advanced mobility safety and technique  []   Reinforced patient's precautions/mobility while maintaining precautions  []   Postural awareness  []   Family/caregiver training  [x]   Progress was updated and reviewed with patient  this date.  []   Other:       Treatment Plan for Next Session: progress QI elements        Treatment/Activity Tolerance:   [x] Tolerated treatment with no adverse effects    [] Patient limited by fatigue  [] Patient limited by pain   [] Patient limited by medical complications:    [] Adverse reaction to Tx:   [] Significant change in status    Safety:       []  bed alarm set    [x]  chair alarm set    []  Pt refused alarms                []  Telesitter activated      [x]  Gait belt used during tx session      [x] Call light and belongings in reach       [] Other      Number of Minutes/Billable Intervention  Gait Training 70   Therapeutic Exercise 20

## 2024-12-24 NOTE — CARE COORDINATION
LISW met with the patient and his family to discuss discharge plans. Notified them that we would receive the date we are working towards this afternoon.     Patient provided with list of [facility] participating C in the geographic area of the patient served. Patient selected [facility] and was provided with a handout from [facility]’s website. The patient (and/or Family) was educated on the quality outcomes for each provider. Patient (and/or Family) demonstrated understanding. Per patient/family request, referral made to [facility].     LISW met with patient and provided written communication following Care Conference. LISW informed patient of recommendations for 2WW, TTB, HHC PT, OT. Notified patient that insurance does not cover TTB. Patient verbalized understanding and will choose an agency closer to discharge. Whiteboard updated.     Left a voicemail for patient's brother.     D/C Plan:  Estimated Date: Dec 31  DME: 2WW (Agency [facility])  HHC:  PT, OT (Agency [facility])  To:  Home with brother (brother will transport)    Concurrent review was sent to [facility] via routed fax 3:32 PM.

## 2024-12-25 LAB
BASOPHILS # BLD: 0.08 K/UL
BASOPHILS NFR BLD: 1 % (ref 0–1)
EOSINOPHIL # BLD: 0.11 K/UL
EOSINOPHILS RELATIVE PERCENT: 1 % (ref 0–3)
ERYTHROCYTE [DISTWIDTH] IN BLOOD BY AUTOMATED COUNT: 15.9 % (ref 11.7–14.9)
HCT VFR BLD AUTO: 32.7 % (ref 42–52)
HGB BLD-MCNC: 9.8 G/DL (ref 13.5–18)
IMM GRANULOCYTES # BLD AUTO: 0.05 K/UL
IMM GRANULOCYTES NFR BLD: 0 %
LYMPHOCYTES NFR BLD: 2.18 K/UL
LYMPHOCYTES RELATIVE PERCENT: 19 % (ref 24–44)
MCH RBC QN AUTO: 32.5 PG (ref 27–31)
MCHC RBC AUTO-ENTMCNC: 30 G/DL (ref 32–36)
MCV RBC AUTO: 108.3 FL (ref 78–100)
MONOCYTES NFR BLD: 0.97 K/UL
MONOCYTES NFR BLD: 9 % (ref 0–4)
NEUTROPHILS NFR BLD: 70 % (ref 36–66)
NEUTS SEG NFR BLD: 8 K/UL
PLATELET # BLD AUTO: 690 K/UL (ref 140–440)
PMV BLD AUTO: 8.7 FL (ref 7.5–11.1)
RBC # BLD AUTO: 3.02 M/UL (ref 4.6–6.2)
WBC OTHER # BLD: 11.4 K/UL (ref 4–10.5)

## 2024-12-25 PROCEDURE — 6370000000 HC RX 637 (ALT 250 FOR IP): Performed by: NURSE PRACTITIONER

## 2024-12-25 PROCEDURE — 36415 COLL VENOUS BLD VENIPUNCTURE: CPT

## 2024-12-25 PROCEDURE — 1280000000 HC REHAB R&B

## 2024-12-25 PROCEDURE — 6370000000 HC RX 637 (ALT 250 FOR IP): Performed by: PHYSICAL MEDICINE & REHABILITATION

## 2024-12-25 PROCEDURE — 85025 COMPLETE CBC W/AUTO DIFF WBC: CPT

## 2024-12-25 PROCEDURE — 6360000002 HC RX W HCPCS: Performed by: NURSE PRACTITIONER

## 2024-12-25 PROCEDURE — 94761 N-INVAS EAR/PLS OXIMETRY MLT: CPT

## 2024-12-25 RX ADMIN — SENNOSIDES AND DOCUSATE SODIUM 1 TABLET: 50; 8.6 TABLET ORAL at 21:14

## 2024-12-25 RX ADMIN — MELATONIN TAB 3 MG 6 MG: 3 TAB at 21:14

## 2024-12-25 RX ADMIN — SERTRALINE HYDROCHLORIDE 200 MG: 50 TABLET ORAL at 08:37

## 2024-12-25 RX ADMIN — Medication 1000 MCG: at 08:38

## 2024-12-25 RX ADMIN — DONEPEZIL HYDROCHLORIDE 10 MG: 10 TABLET ORAL at 21:14

## 2024-12-25 RX ADMIN — ACETAMINOPHEN 650 MG: 325 TABLET ORAL at 21:15

## 2024-12-25 RX ADMIN — MEMANTINE 10 MG: 10 TABLET ORAL at 08:38

## 2024-12-25 RX ADMIN — IBUPROFEN 400 MG: 400 TABLET, FILM COATED ORAL at 08:38

## 2024-12-25 RX ADMIN — OXYCODONE HYDROCHLORIDE 10 MG: 10 TABLET ORAL at 08:39

## 2024-12-25 RX ADMIN — OXYCODONE HYDROCHLORIDE 10 MG: 10 TABLET ORAL at 21:19

## 2024-12-25 RX ADMIN — SENNOSIDES AND DOCUSATE SODIUM 1 TABLET: 50; 8.6 TABLET ORAL at 08:37

## 2024-12-25 RX ADMIN — LACOSAMIDE 200 MG: 100 TABLET, FILM COATED ORAL at 08:38

## 2024-12-25 RX ADMIN — MEMANTINE 10 MG: 10 TABLET ORAL at 21:19

## 2024-12-25 RX ADMIN — ACETAMINOPHEN 650 MG: 325 TABLET ORAL at 14:44

## 2024-12-25 RX ADMIN — FOLIC ACID 0.5 MG: 1 TABLET ORAL at 08:38

## 2024-12-25 RX ADMIN — FAMOTIDINE 20 MG: 20 TABLET ORAL at 08:38

## 2024-12-25 RX ADMIN — LEVETIRACETAM 1500 MG: 500 TABLET, FILM COATED ORAL at 21:14

## 2024-12-25 RX ADMIN — Medication 100 MG: at 08:38

## 2024-12-25 RX ADMIN — DIVALPROEX SODIUM 500 MG: 500 TABLET, DELAYED RELEASE ORAL at 17:59

## 2024-12-25 RX ADMIN — LACOSAMIDE 200 MG: 100 TABLET, FILM COATED ORAL at 21:14

## 2024-12-25 RX ADMIN — ACETAMINOPHEN 650 MG: 325 TABLET ORAL at 08:38

## 2024-12-25 RX ADMIN — IBUPROFEN 400 MG: 400 TABLET, FILM COATED ORAL at 17:11

## 2024-12-25 RX ADMIN — CLOPIDOGREL BISULFATE 75 MG: 75 TABLET ORAL at 08:38

## 2024-12-25 RX ADMIN — Medication 1000 MCG: at 21:14

## 2024-12-25 RX ADMIN — ENOXAPARIN SODIUM 30 MG: 100 INJECTION SUBCUTANEOUS at 17:59

## 2024-12-25 RX ADMIN — ATORVASTATIN CALCIUM 40 MG: 40 TABLET, FILM COATED ORAL at 21:14

## 2024-12-25 RX ADMIN — DIVALPROEX SODIUM 500 MG: 500 TABLET, DELAYED RELEASE ORAL at 08:38

## 2024-12-25 RX ADMIN — LEVETIRACETAM 1500 MG: 500 TABLET, FILM COATED ORAL at 08:37

## 2024-12-25 RX ADMIN — FAMOTIDINE 20 MG: 20 TABLET ORAL at 21:14

## 2024-12-25 ASSESSMENT — PAIN SCALES - GENERAL
PAINLEVEL_OUTOF10: 8
PAINLEVEL_OUTOF10: 8
PAINLEVEL_OUTOF10: 4
PAINLEVEL_OUTOF10: 8

## 2024-12-25 ASSESSMENT — PAIN DESCRIPTION - LOCATION
LOCATION: HIP;KNEE
LOCATION: HIP
LOCATION: KNEE

## 2024-12-25 ASSESSMENT — PAIN DESCRIPTION - FREQUENCY
FREQUENCY: CONTINUOUS
FREQUENCY: INTERMITTENT
FREQUENCY: CONTINUOUS

## 2024-12-25 ASSESSMENT — PAIN DESCRIPTION - ONSET
ONSET: ON-GOING

## 2024-12-25 ASSESSMENT — PAIN DESCRIPTION - DESCRIPTORS
DESCRIPTORS: DISCOMFORT
DESCRIPTORS: DULL;DISCOMFORT
DESCRIPTORS: SORE;THROBBING

## 2024-12-25 ASSESSMENT — PAIN - FUNCTIONAL ASSESSMENT
PAIN_FUNCTIONAL_ASSESSMENT: PREVENTS OR INTERFERES SOME ACTIVE ACTIVITIES AND ADLS
PAIN_FUNCTIONAL_ASSESSMENT: ACTIVITIES ARE NOT PREVENTED
PAIN_FUNCTIONAL_ASSESSMENT: ACTIVITIES ARE NOT PREVENTED

## 2024-12-25 ASSESSMENT — PAIN DESCRIPTION - ORIENTATION
ORIENTATION: LEFT

## 2024-12-25 ASSESSMENT — PAIN DESCRIPTION - PAIN TYPE
TYPE: SURGICAL PAIN;ACUTE PAIN
TYPE: SURGICAL PAIN
TYPE: ACUTE PAIN;SURGICAL PAIN

## 2024-12-25 NOTE — PLAN OF CARE
Problem: Safety - Adult  Goal: Free from fall injury  12/24/2024 2206 by Luke Nelson, RN  Outcome: Progressing  12/24/2024 1055 by Luke Nelson, RN  Outcome: Progressing

## 2024-12-26 PROCEDURE — 6360000002 HC RX W HCPCS: Performed by: NURSE PRACTITIONER

## 2024-12-26 PROCEDURE — 97535 SELF CARE MNGMENT TRAINING: CPT

## 2024-12-26 PROCEDURE — 1280000000 HC REHAB R&B

## 2024-12-26 PROCEDURE — 6370000000 HC RX 637 (ALT 250 FOR IP): Performed by: NURSE PRACTITIONER

## 2024-12-26 PROCEDURE — 6370000000 HC RX 637 (ALT 250 FOR IP): Performed by: PHYSICAL MEDICINE & REHABILITATION

## 2024-12-26 PROCEDURE — 97530 THERAPEUTIC ACTIVITIES: CPT

## 2024-12-26 PROCEDURE — 97116 GAIT TRAINING THERAPY: CPT

## 2024-12-26 PROCEDURE — 94761 N-INVAS EAR/PLS OXIMETRY MLT: CPT

## 2024-12-26 PROCEDURE — 99232 SBSQ HOSP IP/OBS MODERATE 35: CPT | Performed by: PHYSICAL MEDICINE & REHABILITATION

## 2024-12-26 RX ADMIN — LEVETIRACETAM 1500 MG: 500 TABLET, FILM COATED ORAL at 21:12

## 2024-12-26 RX ADMIN — DIVALPROEX SODIUM 500 MG: 500 TABLET, DELAYED RELEASE ORAL at 08:31

## 2024-12-26 RX ADMIN — ATORVASTATIN CALCIUM 40 MG: 40 TABLET, FILM COATED ORAL at 21:13

## 2024-12-26 RX ADMIN — IBUPROFEN 400 MG: 400 TABLET, FILM COATED ORAL at 08:29

## 2024-12-26 RX ADMIN — Medication 1000 MCG: at 21:13

## 2024-12-26 RX ADMIN — IBUPROFEN 400 MG: 400 TABLET, FILM COATED ORAL at 18:16

## 2024-12-26 RX ADMIN — MEMANTINE 10 MG: 10 TABLET ORAL at 08:28

## 2024-12-26 RX ADMIN — SERTRALINE HYDROCHLORIDE 200 MG: 50 TABLET ORAL at 08:29

## 2024-12-26 RX ADMIN — FAMOTIDINE 20 MG: 20 TABLET ORAL at 08:30

## 2024-12-26 RX ADMIN — Medication 1000 MCG: at 08:36

## 2024-12-26 RX ADMIN — DIVALPROEX SODIUM 500 MG: 500 TABLET, DELAYED RELEASE ORAL at 18:16

## 2024-12-26 RX ADMIN — LEVETIRACETAM 1500 MG: 500 TABLET, FILM COATED ORAL at 08:29

## 2024-12-26 RX ADMIN — ACETAMINOPHEN 650 MG: 325 TABLET ORAL at 15:25

## 2024-12-26 RX ADMIN — CLOPIDOGREL BISULFATE 75 MG: 75 TABLET ORAL at 08:29

## 2024-12-26 RX ADMIN — LACOSAMIDE 200 MG: 100 TABLET, FILM COATED ORAL at 21:12

## 2024-12-26 RX ADMIN — ACETAMINOPHEN 650 MG: 325 TABLET ORAL at 08:29

## 2024-12-26 RX ADMIN — FAMOTIDINE 20 MG: 20 TABLET ORAL at 21:12

## 2024-12-26 RX ADMIN — SENNOSIDES AND DOCUSATE SODIUM 1 TABLET: 50; 8.6 TABLET ORAL at 21:13

## 2024-12-26 RX ADMIN — OXYCODONE HYDROCHLORIDE 10 MG: 10 TABLET ORAL at 08:27

## 2024-12-26 RX ADMIN — DONEPEZIL HYDROCHLORIDE 10 MG: 10 TABLET ORAL at 21:13

## 2024-12-26 RX ADMIN — MEMANTINE 10 MG: 10 TABLET ORAL at 21:13

## 2024-12-26 RX ADMIN — ENOXAPARIN SODIUM 30 MG: 100 INJECTION SUBCUTANEOUS at 18:16

## 2024-12-26 RX ADMIN — MELATONIN TAB 3 MG 6 MG: 3 TAB at 21:14

## 2024-12-26 RX ADMIN — SENNOSIDES AND DOCUSATE SODIUM 1 TABLET: 50; 8.6 TABLET ORAL at 08:28

## 2024-12-26 RX ADMIN — FOLIC ACID 0.5 MG: 1 TABLET ORAL at 08:30

## 2024-12-26 RX ADMIN — OXYCODONE HYDROCHLORIDE 10 MG: 10 TABLET ORAL at 15:24

## 2024-12-26 RX ADMIN — ACETAMINOPHEN 650 MG: 325 TABLET ORAL at 21:13

## 2024-12-26 RX ADMIN — Medication 100 MG: at 08:31

## 2024-12-26 RX ADMIN — LACOSAMIDE 200 MG: 100 TABLET, FILM COATED ORAL at 08:28

## 2024-12-26 ASSESSMENT — PAIN DESCRIPTION - ONSET
ONSET: ON-GOING

## 2024-12-26 ASSESSMENT — PAIN - FUNCTIONAL ASSESSMENT
PAIN_FUNCTIONAL_ASSESSMENT: PREVENTS OR INTERFERES SOME ACTIVE ACTIVITIES AND ADLS
PAIN_FUNCTIONAL_ASSESSMENT: PREVENTS OR INTERFERES SOME ACTIVE ACTIVITIES AND ADLS
PAIN_FUNCTIONAL_ASSESSMENT: ACTIVITIES ARE NOT PREVENTED

## 2024-12-26 ASSESSMENT — PAIN DESCRIPTION - ORIENTATION
ORIENTATION: LEFT

## 2024-12-26 ASSESSMENT — PAIN SCALES - GENERAL
PAINLEVEL_OUTOF10: 3
PAINLEVEL_OUTOF10: 9
PAINLEVEL_OUTOF10: 7
PAINLEVEL_OUTOF10: 5
PAINLEVEL_OUTOF10: 4
PAINLEVEL_OUTOF10: 7
PAINLEVEL_OUTOF10: 9
PAINLEVEL_OUTOF10: 7

## 2024-12-26 ASSESSMENT — PAIN DESCRIPTION - PAIN TYPE
TYPE: SURGICAL PAIN

## 2024-12-26 ASSESSMENT — PAIN DESCRIPTION - DESCRIPTORS
DESCRIPTORS: ACHING;THROBBING
DESCRIPTORS: ACHING
DESCRIPTORS: ACHING
DESCRIPTORS: ACHING;THROBBING
DESCRIPTORS: ACHING;THROBBING

## 2024-12-26 ASSESSMENT — PAIN DESCRIPTION - LOCATION
LOCATION: HIP

## 2024-12-26 ASSESSMENT — PAIN DESCRIPTION - FREQUENCY
FREQUENCY: INTERMITTENT
FREQUENCY: CONTINUOUS
FREQUENCY: INTERMITTENT

## 2024-12-26 NOTE — PROGRESS NOTES
Occupational Therapy    Physical Rehabilitation: OCCUPATIONAL THERAPY     [x] daily progress note       [] discharge       Patient Name:  Brent Degroot   :  1960 MRN: [id number]  Room:  [address] [place] [address] Date of Admission: 2024  Rehabilitation Diagnosis:   Fracture of unspecified part of neck of left femur, initial encounter for closed fracture [S72.002A]  Closed intertrochanteric fracture of hip, left, initial encounter ([facility]) [S72.142A]       Date 2024       Day of ARU Week:  1   Time IN//930;1100/1200   Individual Tx Minutes 60+60   Group Tx Minutes    Co-Treat Minutes    Concurrent Tx Minutes    TOTAL Tx Time Mins 120   Variance Time    Variance Reason []   Refusal due to:     []   Medical hold/reason:    []   Illness   []   Off Unit for test/procedure  []   Extra time needed to complete task  []   Therapeutic need  []   Make up mins were attempted but pt unable to complete due to (specify)  []   Other (specify):   Restrictions Restrictions/Precautions: General Precautions, Fall Risk, Weight Bearing (WBAT LLE, IV RUE)         Communication with other providers: []   OK to see per nursing:     []   Spoke with team member regarding:      Subjective observations and cognitive status: Pt in high-semi fowlers upon therapist arrival with NSG. Pt pleasant and agreeable but had not touched breakfast yet. Pt sat EOB to finish breakfast.    PM: Pt in recliner cleaning binoculars upon therapist arrival. Pt pleasant and agreeable to therapy.      Pain level/location:   9 /10       Location: L thigh; same as morning session   Discharge recommendations  Anticipated discharge date:    Destination: []home alone   []home alone w assist prn   [] home w/ family    [x] Continuous supervision       []SNF    [] Assisted living     [] Other:   Continued therapy: [x]HHC OT  []OUTPATIENT  OT   [] No Further OT  Equipment needs: Brent Degroot requires the assistance of a tub transfer bench to successfully and                        Goals:  (Update in navigator)  Short Term Goals  Time Frame for Short Term Goals: STGs=LTGs:  Long Term Goals  Time Frame for Long Term Goals : 10 days or until d/c  Long Term Goal 1: Pt will complete grooming tasks Ind  Long Term Goal 2: Pt will complete total body bathing mod I  Long Term Goal 3: Pt will complete UB dressing Ind  Long Term Goal 4: Pt will complete LB dressing mod I using AE PRN  Long Term Goal 5: Pt will doff/don footwear mod I using AE PRN  Additional Goals?: Yes  Long Term Goal 6: Pt will complete toileting mod I  Long Term Goal 7: Pt will complete functional transfers (bed, chair, toilet, shower) c DME PRN and mod I  Long Term Goal 8: Pt will perform therex/therax to facilitate increased strength/endurance/ax tolerance (c emphasis on dynamic standing balance/tolerance >8 mins, BUE endurance) c SBA  Long Term Goal 9: Pt will complete simple homemaking tasks c DME PRN and S:        Plan of Care                                                                              Times per week: 5 days per week for a minimum of 60 minutes/day plus group as appropriate for 60 minutes.  Treatment to include Occupational Therapy Plan  Current Treatment Recommendations: Balance training, Functional mobility training, Endurance training, Pain management, Safety education & training, Patient/Caregiver education & training, Equipment evaluation, education, & procurement, Positioning, Self-Care / ADL, Home management training, Group Therapy    Electronically signed by   GRETCHEN Hess,  12/26/2024, 9:03 AM

## 2024-12-26 NOTE — PROGRESS NOTES
[]  Ramp       []  Outdoor pavements        []  Grass             []  Loose gravel        []  Other:         Additional Therapeutic activities/exercises completed this date:     []   Nu-step:  Time:        Level:         #Steps:       []   Rebounder:    []  Seated     []  Standing        []   Balance training         []   Postural training    []   Supine ther ex (reps/sets):     []   Seated ther ex (reps/sets):     []   Standing ther ex (reps/sets):     [x]   Picking up object from floor (standing):      SBA             [x]   Reacher used   []   Other:   []   Other:      Patient/Caregiver Education and Training:   [x]   Bed Mobility/Transfer technique/safety  [x]   Gait technique/sequencing  [x]   Proper use of assistive device  [x]   Advanced mobility safety and technique  [x]   Reinforced patient's precautions/mobility while maintaining precautions  [x]   Postural awareness  []   Family training      Treatment Plan for Next Session: progress pt per POC      Treatment/Activity Tolerance:   [x] Tolerated treatment with no adverse effects    [] Patient limited by fatigue  [] Patient limited by pain   [] Patient limited by medical complications:    [] Adverse reaction to Tx:   [] Significant change in status    Safety:       []  bed alarm set    [x]  chair alarm set    []  Pt refused alarms                []  Telesitter activated      [x]  Gait belt used during tx session      [x]other:   call light left with pt reach      Number of Minutes/Billable Intervention  Gait Training 30   Therapeutic Exercise    Neuro Re-Ed    Therapeutic Activity 30   Wheelchair Propulsion    Group    Other:    TOTAL 60         Social History  Social/Functional History  Lives With: Family (Brother (Tevin, retired))  Type of Home: House  Home Layout: One level (1 step into/out of rest of house from bedroom)  Home Access: Stairs to enter with rails  Entrance Stairs - Number of Steps: 3 ALEXSANDRA  Entrance Stairs - Rails: Both (can reach both                 Times per week: 5 days per week for a minimum of 60 minutes/day plus group as appropriate for 60 minutes.  Treatment to include Current Treatment Recommendations: Strengthening, ROM, Balance training, Functional mobility training, Transfer training, Endurance training, IADL training, Stair training, Gait training, Neuromuscular re-education, Manual, Patient/Caregiver education & training, Pain management, Safety education & training, Home exercise program, Equipment evaluation, education, & procurement, Positioning, Modalities, Group Therapy, Therapeutic activities    Electronically signed by   Dari Brandt PTA,  12/26/2024, 8:21 AM

## 2024-12-26 NOTE — PLAN OF CARE

## 2024-12-27 PROCEDURE — 6360000002 HC RX W HCPCS: Performed by: NURSE PRACTITIONER

## 2024-12-27 PROCEDURE — 1280000000 HC REHAB R&B

## 2024-12-27 PROCEDURE — 94150 VITAL CAPACITY TEST: CPT

## 2024-12-27 PROCEDURE — 99232 SBSQ HOSP IP/OBS MODERATE 35: CPT | Performed by: PHYSICAL MEDICINE & REHABILITATION

## 2024-12-27 PROCEDURE — 97530 THERAPEUTIC ACTIVITIES: CPT

## 2024-12-27 PROCEDURE — 97535 SELF CARE MNGMENT TRAINING: CPT

## 2024-12-27 PROCEDURE — 6370000000 HC RX 637 (ALT 250 FOR IP): Performed by: PHYSICAL MEDICINE & REHABILITATION

## 2024-12-27 PROCEDURE — 94761 N-INVAS EAR/PLS OXIMETRY MLT: CPT

## 2024-12-27 PROCEDURE — 97116 GAIT TRAINING THERAPY: CPT

## 2024-12-27 PROCEDURE — 6370000000 HC RX 637 (ALT 250 FOR IP): Performed by: NURSE PRACTITIONER

## 2024-12-27 PROCEDURE — 97112 NEUROMUSCULAR REEDUCATION: CPT

## 2024-12-27 RX ADMIN — DONEPEZIL HYDROCHLORIDE 10 MG: 10 TABLET ORAL at 21:44

## 2024-12-27 RX ADMIN — FAMOTIDINE 20 MG: 20 TABLET ORAL at 21:48

## 2024-12-27 RX ADMIN — SERTRALINE HYDROCHLORIDE 200 MG: 50 TABLET ORAL at 09:26

## 2024-12-27 RX ADMIN — OXYCODONE HYDROCHLORIDE 10 MG: 10 TABLET ORAL at 09:27

## 2024-12-27 RX ADMIN — LEVETIRACETAM 1500 MG: 500 TABLET, FILM COATED ORAL at 21:46

## 2024-12-27 RX ADMIN — DIVALPROEX SODIUM 500 MG: 500 TABLET, DELAYED RELEASE ORAL at 18:26

## 2024-12-27 RX ADMIN — CLOPIDOGREL BISULFATE 75 MG: 75 TABLET ORAL at 09:26

## 2024-12-27 RX ADMIN — ACETAMINOPHEN 650 MG: 325 TABLET ORAL at 18:26

## 2024-12-27 RX ADMIN — LACOSAMIDE 200 MG: 100 TABLET, FILM COATED ORAL at 09:26

## 2024-12-27 RX ADMIN — MEMANTINE 10 MG: 10 TABLET ORAL at 22:13

## 2024-12-27 RX ADMIN — DIVALPROEX SODIUM 500 MG: 500 TABLET, DELAYED RELEASE ORAL at 09:27

## 2024-12-27 RX ADMIN — MELATONIN TAB 3 MG 6 MG: 3 TAB at 21:46

## 2024-12-27 RX ADMIN — FOLIC ACID 0.5 MG: 1 TABLET ORAL at 09:26

## 2024-12-27 RX ADMIN — ACETAMINOPHEN 650 MG: 325 TABLET ORAL at 21:44

## 2024-12-27 RX ADMIN — Medication 100 MG: at 09:26

## 2024-12-27 RX ADMIN — Medication 1000 MCG: at 22:15

## 2024-12-27 RX ADMIN — POLYETHYLENE GLYCOL (3350) 17 G: 17 POWDER, FOR SOLUTION ORAL at 09:24

## 2024-12-27 RX ADMIN — ENOXAPARIN SODIUM 30 MG: 100 INJECTION SUBCUTANEOUS at 18:26

## 2024-12-27 RX ADMIN — LACOSAMIDE 200 MG: 100 TABLET, FILM COATED ORAL at 21:46

## 2024-12-27 RX ADMIN — IBUPROFEN 400 MG: 400 TABLET, FILM COATED ORAL at 18:26

## 2024-12-27 RX ADMIN — Medication 1000 MCG: at 09:27

## 2024-12-27 RX ADMIN — IBUPROFEN 400 MG: 400 TABLET, FILM COATED ORAL at 09:26

## 2024-12-27 RX ADMIN — ACETAMINOPHEN 650 MG: 325 TABLET ORAL at 09:25

## 2024-12-27 RX ADMIN — LEVETIRACETAM 1500 MG: 500 TABLET, FILM COATED ORAL at 09:27

## 2024-12-27 RX ADMIN — MEMANTINE 10 MG: 10 TABLET ORAL at 09:27

## 2024-12-27 RX ADMIN — FAMOTIDINE 20 MG: 20 TABLET ORAL at 09:27

## 2024-12-27 RX ADMIN — ATORVASTATIN CALCIUM 40 MG: 40 TABLET, FILM COATED ORAL at 22:12

## 2024-12-27 RX ADMIN — ACETAMINOPHEN 650 MG: 325 TABLET ORAL at 13:22

## 2024-12-27 RX ADMIN — SENNOSIDES AND DOCUSATE SODIUM 1 TABLET: 50; 8.6 TABLET ORAL at 21:48

## 2024-12-27 RX ADMIN — SENNOSIDES AND DOCUSATE SODIUM 1 TABLET: 50; 8.6 TABLET ORAL at 09:27

## 2024-12-27 ASSESSMENT — PAIN SCALES - GENERAL
PAINLEVEL_OUTOF10: 9
PAINLEVEL_OUTOF10: 9
PAINLEVEL_OUTOF10: 0
PAINLEVEL_OUTOF10: 8
PAINLEVEL_OUTOF10: 5

## 2024-12-27 ASSESSMENT — PAIN DESCRIPTION - FREQUENCY: FREQUENCY: CONTINUOUS

## 2024-12-27 ASSESSMENT — PAIN DESCRIPTION - PAIN TYPE: TYPE: SURGICAL PAIN

## 2024-12-27 ASSESSMENT — PAIN DESCRIPTION - DESCRIPTORS
DESCRIPTORS: ACHING
DESCRIPTORS: ACHING;THROBBING
DESCRIPTORS: ACHING

## 2024-12-27 ASSESSMENT — PAIN DESCRIPTION - LOCATION
LOCATION: HIP;LEG
LOCATION: HIP;LEG
LOCATION: LEG;HIP

## 2024-12-27 ASSESSMENT — PAIN DESCRIPTION - ONSET: ONSET: ON-GOING

## 2024-12-27 ASSESSMENT — PAIN DESCRIPTION - ORIENTATION
ORIENTATION: LEFT

## 2024-12-27 ASSESSMENT — PAIN SCALES - WONG BAKER: WONGBAKER_NUMERICALRESPONSE: NO HURT

## 2024-12-27 NOTE — PROGRESS NOTES
Brent Degroot    : 1960  Acct #: [id number]  MRN: [id number]              PM&R Progress Note      Admitting diagnosis: ***    Comorbid diagnoses impacting rehabilitation: ***    Chief complaint: ***    Prior (baseline) level of function: Independent.    Current level of function:         Current  IRF-MANNY and Goals:   Occupational Therapy:    Short Term Goals  Time Frame for Short Term Goals: STGs=LTGs :   Long Term Goals  Time Frame for Long Term Goals : 10 days or until d/c  Long Term Goal 1: Pt will complete grooming tasks Ind  Long Term Goal 2: Pt will complete total body bathing mod I  Long Term Goal 3: Pt will complete UB dressing Ind  Long Term Goal 4: Pt will complete LB dressing mod I using AE PRN  Long Term Goal 5: Pt will doff/don footwear mod I using AE PRN  Additional Goals?: Yes  Long Term Goal 6: Pt will complete toileting mod I  Long Term Goal 7: Pt will complete functional transfers (bed, chair, toilet, shower) c DME PRN and mod I  Long Term Goal 8: Pt will perform therex/therax to facilitate increased strength/endurance/ax tolerance (c emphasis on dynamic standing balance/tolerance >8 mins, BUE endurance) c SBA  Long Term Goal 9: Pt will complete simple homemaking tasks c DME PRN and S :                                       Eating: Eating  Assistance Needed: Independent  Comment: X  CARE Score: 6  Discharge Goal: Independent       Oral Hygiene: Oral Hygiene  Assistance Needed: Independent  Comment: seated at sink  CARE Score: 6  Discharge Goal: Independent    UB/LB Bathing: Shower/Bathe Self  Assistance Needed: Supervision or touching assistance  Comment: Performed full shower seated, used LHS to wash LLE  CARE Score: 4  Discharge Goal: Independent    UB Dressing: Upper Body Dressing  Assistance Needed: Partial/moderate assistance  Comment: able to doff most of shirt; required some assist to manage sleeve over IV line. Pt threaded BUEs into shirt, however, then got disoriented and

## 2024-12-27 NOTE — PROGRESS NOTES
Comprehensive Nutrition Assessment    Type and Reason for Visit:  Reassess    Nutrition Recommendations/Plan:   Continue regular diet   Will continue to offer high calorie oral nutrition supplement with all meals   Will continue to follow up during stay      Malnutrition Assessment:  Malnutrition Status:  At risk for malnutrition (12/20/24 1529)    Context:  Acute Illness       Nutrition Assessment:    Meal intake improving this week with recent meals again %. Remains on regular diet. Will also consume at least 2 high calorie oral nutrition supplements each day to provide additional 700 calories, 40 g protein. Encouraged to continue eating well to maintain/gain weight. Will continue to follow at moderate nutrition risk at this time.    Nutrition Related Findings:    up at edge of bed eating lunch -full meal eating more than 75%  and already drank Ensure    Hb9.8 increasing Wound Type: Surgical Incision       Current Nutrition Intake & Therapies:    Average Meal Intake: %  Average Supplements Intake: %  ADULT DIET; Regular  ADULT ORAL NUTRITION SUPPLEMENT; Lunch, Breakfast, Dinner; Standard High Calorie/High Protein Oral Supplement    Anthropometric Measures:  Height: 157.5 cm (5' 2.01\")  Ideal Body Weight (IBW): 118 lbs (54 kg)    Admission Body Weight: 46.6 kg (102 lb 11.8 oz)  Current Body Weight: 45 kg (99 lb 3.3 oz), 87.1 % IBW. Weight Source: Bed scale  Current BMI (kg/m2): 18.1  Usual Body Weight: 45.1 kg (99 lb 6.8 oz) (Oct 2023)     % Weight Change (Calculated): 3.3  Weight Adjustment For: No Adjustment                 BMI Categories: Normal Weight (BMI 18.5-24.9)    Estimated Daily Nutrient Needs:  Energy Requirements Based On: Kcal/kg  Weight Used for Energy Requirements: Current  Energy (kcal/day): 6192-5862 (30-35 siva/kg)  Weight Used for Protein Requirements: Current  Protein (g/day): 56-66 (1.2-1.4 g/kg)  Method Used for Fluid Requirements: 1 ml/kcal  Fluid (ml/day): 1600    Nutrition

## 2024-12-27 NOTE — CARE COORDINATION
[facility] Liaison spoke, demo info reviewed and is aware of discharge scheduled for 12/31 & initiated HHC prior to dc.

## 2024-12-27 NOTE — CARE COORDINATION
VASQUEZ spoke with patient's brother. They chose [facility] and referral was made.     VASQUEZ spoke with Helena with [facility]. Order for 2WW was received and is with the intake department being processed.

## 2024-12-27 NOTE — PLAN OF CARE
Problem: Chronic Conditions and Co-morbidities  Goal: Patient's chronic conditions and co-morbidity symptoms are monitored and maintained or improved  Outcome: Progressing     Problem: Discharge Planning  Goal: Discharge to home or other facility with appropriate resources  Outcome: Progressing     Problem: Safety - Adult  Goal: Free from fall injury  Outcome: Progressing     Problem: Pain  Goal: Verbalizes/displays adequate comfort level or baseline comfort level  Outcome: Progressing     Problem: ABCDS Injury Assessment  Goal: Absence of physical injury  Outcome: Progressing     Problem: Nutrition Deficit:  Goal: Optimize nutritional status  Outcome: Progressing  Flowsheets (Taken 12/27/2024 1237 by Lenore Ignacio, RD, LD)  Nutrient intake appropriate for improving, restoring, or maintaining nutritional needs:   Monitor oral intake, labs, and treatment plans   Recommend appropriate diets, oral nutritional supplements, and vitamin/mineral supplements   Assess nutritional status and recommend course of action     Problem: Skin/Tissue Integrity  Goal: Absence of new skin breakdown  Description: 1.  Monitor for areas of redness and/or skin breakdown  2.  Assess vascular access sites hourly  3.  Every 4-6 hours minimum:  Change oxygen saturation probe site  4.  Every 4-6 hours:  If on nasal continuous positive airway pressure, respiratory therapy assess nares and determine need for appliance change or resting period.  Outcome: Progressing

## 2024-12-27 NOTE — PROGRESS NOTES
Occupational Therapy    Physical Rehabilitation: OCCUPATIONAL THERAPY     [x] daily progress note       [] discharge       Patient Name:  Brent Degroot   :  1960 MRN: [id number]  Room:  [address] [place] [address] Date of Admission: 2024  Rehabilitation Diagnosis:   Fracture of unspecified part of neck of left femur, initial encounter for closed fracture [S72.002A]  Closed intertrochanteric fracture of hip, left, initial encounter ([facility]) [S72.142A]       Date 2024       Day of ARU Week:  2   Time IN/OUT 7288-8094   Individual Tx Minutes 60   Group Tx Minutes    Co-Treat Minutes    Concurrent Tx Minutes    TOTAL Tx Time Mins 60   Variance Time    Variance Reason []   Refusal due to:     []   Medical hold/reason:    []   Illness   []   Off Unit for test/procedure  []   Extra time needed to complete task  []   Therapeutic need  []   Make up mins were attempted but pt unable to complete due to (specify)  []   Other (specify):   Restrictions Restrictions/Precautions: General Precautions, Fall Risk, Weight Bearing (WBAT LLE, IV RUE)         Communication with other providers: [x]   OK to see per nursing:     []   Spoke with team member regarding:      Subjective observations and cognitive status: Pt sitting up in recliner on approach; pleasant and agreeable to therapy.      Pain level/location:    /10       Location:    Discharge recommendations  Anticipated discharge date:    Destination: []home alone   []home alone w assist prn   [] home w/ family    [x] Continuous supervision       []SNF    [] Assisted living     [] Other:   Continued therapy: [x]HHC OT  []OUTPATIENT  OT   [] No Further OT  Equipment needs: Brent Degroot requires the assistance of a tub transfer bench to successfully and safely complete bathing activities necessary due to reduced balance, endurance, need for ADL assist, and LE weakness and reduced ROM. These tasks cannot be safely completed without this device and would place Brent Degroot  Yes  Long Term Goal 6: Pt will complete toileting mod I  Long Term Goal 7: Pt will complete functional transfers (bed, chair, toilet, shower) c DME PRN and mod I  Long Term Goal 8: Pt will perform therex/therax to facilitate increased strength/endurance/ax tolerance (c emphasis on dynamic standing balance/tolerance >8 mins, BUE endurance) c SBA  Long Term Goal 9: Pt will complete simple homemaking tasks c DME PRN and S:        Plan of Care                                                                              Times per week: 5 days per week for a minimum of 60 minutes/day plus group as appropriate for 60 minutes.  Treatment to include Occupational Therapy Plan  Current Treatment Recommendations: Balance training, Functional mobility training, Endurance training, Pain management, Safety education & training, Patient/Caregiver education & training, Equipment evaluation, education, & procurement, Positioning, Self-Care / ADL, Home management training, Group Therapy    Electronically signed by   GRETCHEN Ortiz,  12/27/2024, 12:52 PM

## 2024-12-27 NOTE — PROGRESS NOTES
Physical Therapy  [x] daily progress note       [] discharge       Patient Name:  Bernt Degroot   :  1960 MRN: [id number]  Room:  [address] [place], [place] [address] Date of Admission: 2024  Rehabilitation Diagnosis:   Fracture of unspecified part of neck of left femur, initial encounter for closed fracture [S72.002A]  Closed intertrochanteric fracture of hip, left, initial encounter ([facility]) [S72.142A]       Date 2024       Day of ARU Week:  2   Time IN/OUT 1100/1200   Individual Tx Minutes 60   Group Tx Minutes    Co-Treat Minutes    Concurrent Tx Minutes    TOTAL Tx Time Mins 60   Variance Time    Variance Time []   Refusal due to:     []   Medical hold/reason:    []   Illness   []   Off Unit for test/procedure  []   Extra time needed to complete task  []   Therapeutic need  []   Other (specify):   Restrictions Restrictions/Precautions  Restrictions/Precautions: General Precautions, Fall Risk, Weight Bearing (WBAT LLE, IV RUE)      Interdisciplinary communication [x]   Cleared for therapy per nursing     []   RN notified about issues during session  []   RN updated on pt performance  []   Spoke with   []   Spoke with OT  []   Spoke with MD  []   Other:    Subjective observations and cognitive status: Pt in recliner upon approach. Pt started session by using reacher while in a half stand to reach to walker which was out of reach, despite PT cues to stop. Pt did not lose balance, but PT emphasized safety education. Chair alarm did not sound. Tech checked and fixed.     Nursing asked about potential for mod I I room. Discussed with OT and agree that pt does random unsafe things that put him at increased fall risk. Feel for his safety he needs to remain supervised     Pain level/location:   8 /10       Location: L hip and at times in L trunk. Pt does not have non-verbals to match this.do not feel pt understands pain scale despite education   Discharge recommendations  Anticipated discharge date:   STG=LTG  Short Term Goal 1: Pt will complete all bed mobility with mod I  Short Term Goal 2: Pt will complete all functional transfers with mod I  Short Term Goal 3: Pt will ambulate with 2ww on level surface 150' and 10' on unlevel surface with mod I  Short Term Goal 4: Pt will negotiate curb step with 2ww and 4 steps with B rails with mod I, 12 steps with B rails with supervision  Short Term Goal 5: Pt will  object from floor with 2ww and reacher with mod I:   :        Plan of Care                                                                              Times per week: 5 days per week for a minimum of 60 minutes/day plus group as appropriate for 60 minutes.  Treatment to include Current Treatment Recommendations: Strengthening, ROM, Balance training, Functional mobility training, Transfer training, Endurance training, IADL training, Stair training, Gait training, Neuromuscular re-education, Manual, Patient/Caregiver education & training, Pain management, Safety education & training, Home exercise program, Equipment evaluation, education, & procurement, Positioning, Modalities, Group Therapy, Therapeutic activities    Electronically signed by   Suzan Mata PT,  12/27/2024, 8:19 AM

## 2024-12-27 NOTE — PROGRESS NOTES
Physical Therapy    [x] daily progress note       [] discharge       Patient Name:  Brent Degroot   :  1960 MRN: [id number]  Room:  [address] [place] [address] Date of Admission: 2024  Rehabilitation Diagnosis:   Fracture of unspecified part of neck of left femur, initial encounter for closed fracture [S72.002A]  Closed intertrochanteric fracture of hip, left, initial encounter ([facility]) [S72.142A]       Date 2024       Day of ARU Week:  2   Time IN/OUT 1445/1545   Individual Tx Minutes 60   Group Tx Minutes    Co-Treat Minutes    Concurrent Tx Minutes    TOTAL Tx Time Mins 60   Variance Time    Variance Time []   Refusal due to:     []   Medical hold/reason:    []   Illness   []   Off Unit for test/procedure  []   Extra time needed to complete task  []   Therapeutic need  []   Attempted make-up minutes, however pt was unable to tolerate due to (specify)   []   Other (specify):   Restrictions Restrictions/Precautions  Restrictions/Precautions: General Precautions, Fall Risk, Weight Bearing (WBAT LLE, IV RUE)      Interdisciplinary communication [x]   Cleared for therapy per nursing     []   RN notified about issues during session  []   RN updated on pt performance  []   Spoke with   []   Spoke with OT  []   Spoke with MD  []   Other:    Subjective observations and cognitive status:      Pain level/location:  7 /10       Location: L hip down to knee    Discharge recommendations  Anticipated discharge date: 2024  Destination: []home alone   []home alone with assist PRN     [x] home w/ family      [] Continuous supervision  []SNF    [] Assisted living     [] Other:  Continued therapy: [x]HHC PT  []OUTPATIENT PT   [] No Further PT  []SNF PT  Caregiver training recommended: []Yes  [] No   Equipment needs: 2WW  Brent Degroot requires the assistance of a front-wheeled walker to successfully ambulate from room to room at home to allow completion of daily living tasks such as: bathing, toileting, dressing  progressive gait training and progressing to Mod Ind level on some functional mobility tasks addressed during this PT session. The patient is making good progress toward established goals as evidenced by QI scores.    Treatment/Activity Tolerance:   [x] Tolerated treatment with no adverse effects    [] Patient limited by fatigue  [] Patient limited by pain   [] Patient limited by medical complications:    [] Adverse reaction to Tx:   [] Significant change in status    Safety:       []  bed alarm set    [x]  chair alarm set    []  Pt refused alarms                []  Telesitter activated      [x]  Gait belt used during tx session      []other:       Number of Minutes/Billable Intervention  Gait Training 40   Therapeutic Exercise    Neuro Re-Ed    Therapeutic Activity 20   Wheelchair Propulsion    Group    Other:    TOTAL 60     Social History  Social/Functional History  Lives With: Family (Brother (Tevin, retired))  Type of Home: House  Home Layout: One level (1 step into/out of rest of house from bedroom)  Home Access: Stairs to enter with rails  Entrance Stairs - Number of Steps: 3 ALEXSANDRA  Entrance Stairs - Rails: Both (can reach both simultaneously)  Bathroom Shower/Tub: Tub/Shower unit  Bathroom Toilet: Standard  Bathroom Equipment: Grab bars in shower, Grab bars around toilet  Home Equipment: Cane, Walker - Standard, Reacher, Sock aid, Long-handled shoehorn, Leg   Has the patient had two or more falls in the past year or any fall with injury in the past year?: Yes (1 fall resulting in L hip fx and this hospitalization)  Prior Level of Assist for ADLs: Independent  Prior Level of Assist for Homemaking: Independent  Homemaking Responsibilities: Yes  Meal Prep Responsibility: No  Laundry Responsibility: Primary  Cleaning Responsibility: Secondary (lighter cleaning)  Bill Paying/Finance Responsibility: Secondary  Shopping Responsibility: Secondary  Health Care Management: Primary (uses pillbox)  Prior Level of

## 2024-12-27 NOTE — CARE COORDINATION
Pt has to be seen in Dr Esquivel office within 2 wks of dc in order for hhc to be initiated per Claudia at pcp office.

## 2024-12-28 LAB
BASOPHILS # BLD: 0.07 K/UL
BASOPHILS NFR BLD: 1 % (ref 0–1)
EOSINOPHIL # BLD: 0.13 K/UL
EOSINOPHILS RELATIVE PERCENT: 2 % (ref 0–3)
ERYTHROCYTE [DISTWIDTH] IN BLOOD BY AUTOMATED COUNT: 15.1 % (ref 11.7–14.9)
HCT VFR BLD AUTO: 31.5 % (ref 42–52)
HGB BLD-MCNC: 10.3 G/DL (ref 13.5–18)
IMM GRANULOCYTES # BLD AUTO: 0.02 K/UL
IMM GRANULOCYTES NFR BLD: 0 %
LYMPHOCYTES NFR BLD: 1.63 K/UL
LYMPHOCYTES RELATIVE PERCENT: 22 % (ref 24–44)
MCH RBC QN AUTO: 32.9 PG (ref 27–31)
MCHC RBC AUTO-ENTMCNC: 32.7 G/DL (ref 32–36)
MCV RBC AUTO: 100.6 FL (ref 78–100)
MONOCYTES NFR BLD: 0.84 K/UL
MONOCYTES NFR BLD: 11 % (ref 0–4)
NEUTROPHILS NFR BLD: 64 % (ref 36–66)
NEUTS SEG NFR BLD: 4.86 K/UL
PLATELET # BLD AUTO: 596 K/UL (ref 140–440)
PMV BLD AUTO: 8.6 FL (ref 7.5–11.1)
RBC # BLD AUTO: 3.13 M/UL (ref 4.6–6.2)
WBC OTHER # BLD: 7.6 K/UL (ref 4–10.5)

## 2024-12-28 PROCEDURE — 36415 COLL VENOUS BLD VENIPUNCTURE: CPT

## 2024-12-28 PROCEDURE — 6360000002 HC RX W HCPCS: Performed by: NURSE PRACTITIONER

## 2024-12-28 PROCEDURE — 85025 COMPLETE CBC W/AUTO DIFF WBC: CPT

## 2024-12-28 PROCEDURE — 6370000000 HC RX 637 (ALT 250 FOR IP): Performed by: PHYSICAL MEDICINE & REHABILITATION

## 2024-12-28 PROCEDURE — 6370000000 HC RX 637 (ALT 250 FOR IP): Performed by: NURSE PRACTITIONER

## 2024-12-28 PROCEDURE — 94761 N-INVAS EAR/PLS OXIMETRY MLT: CPT

## 2024-12-28 PROCEDURE — 94150 VITAL CAPACITY TEST: CPT

## 2024-12-28 PROCEDURE — 1280000000 HC REHAB R&B

## 2024-12-28 RX ADMIN — ATORVASTATIN CALCIUM 40 MG: 40 TABLET, FILM COATED ORAL at 22:15

## 2024-12-28 RX ADMIN — LEVETIRACETAM 1500 MG: 500 TABLET, FILM COATED ORAL at 22:15

## 2024-12-28 RX ADMIN — DIVALPROEX SODIUM 500 MG: 500 TABLET, DELAYED RELEASE ORAL at 17:15

## 2024-12-28 RX ADMIN — OXYCODONE HYDROCHLORIDE 5 MG: 5 TABLET ORAL at 06:24

## 2024-12-28 RX ADMIN — DONEPEZIL HYDROCHLORIDE 10 MG: 10 TABLET ORAL at 22:15

## 2024-12-28 RX ADMIN — IBUPROFEN 400 MG: 400 TABLET, FILM COATED ORAL at 09:32

## 2024-12-28 RX ADMIN — MEMANTINE 10 MG: 10 TABLET ORAL at 09:32

## 2024-12-28 RX ADMIN — DIVALPROEX SODIUM 500 MG: 500 TABLET, DELAYED RELEASE ORAL at 09:37

## 2024-12-28 RX ADMIN — ACETAMINOPHEN 650 MG: 325 TABLET ORAL at 22:15

## 2024-12-28 RX ADMIN — ACETAMINOPHEN 650 MG: 325 TABLET ORAL at 09:31

## 2024-12-28 RX ADMIN — Medication 1000 MCG: at 22:15

## 2024-12-28 RX ADMIN — FAMOTIDINE 20 MG: 20 TABLET ORAL at 09:32

## 2024-12-28 RX ADMIN — SENNOSIDES AND DOCUSATE SODIUM 1 TABLET: 50; 8.6 TABLET ORAL at 09:32

## 2024-12-28 RX ADMIN — MEMANTINE 10 MG: 10 TABLET ORAL at 22:15

## 2024-12-28 RX ADMIN — ACETAMINOPHEN 650 MG: 325 TABLET ORAL at 17:15

## 2024-12-28 RX ADMIN — SERTRALINE HYDROCHLORIDE 200 MG: 50 TABLET ORAL at 09:31

## 2024-12-28 RX ADMIN — LEVETIRACETAM 1500 MG: 500 TABLET, FILM COATED ORAL at 09:31

## 2024-12-28 RX ADMIN — CLOPIDOGREL BISULFATE 75 MG: 75 TABLET ORAL at 09:32

## 2024-12-28 RX ADMIN — OXYCODONE HYDROCHLORIDE 10 MG: 10 TABLET ORAL at 17:17

## 2024-12-28 RX ADMIN — ENOXAPARIN SODIUM 30 MG: 100 INJECTION SUBCUTANEOUS at 17:15

## 2024-12-28 RX ADMIN — FOLIC ACID 0.5 MG: 1 TABLET ORAL at 09:31

## 2024-12-28 RX ADMIN — MELATONIN TAB 3 MG 6 MG: 3 TAB at 22:15

## 2024-12-28 RX ADMIN — ACETAMINOPHEN 650 MG: 325 TABLET ORAL at 12:14

## 2024-12-28 RX ADMIN — LACOSAMIDE 200 MG: 100 TABLET, FILM COATED ORAL at 22:15

## 2024-12-28 RX ADMIN — FAMOTIDINE 20 MG: 20 TABLET ORAL at 22:15

## 2024-12-28 RX ADMIN — Medication 100 MG: at 09:37

## 2024-12-28 RX ADMIN — SENNOSIDES AND DOCUSATE SODIUM 1 TABLET: 50; 8.6 TABLET ORAL at 22:15

## 2024-12-28 RX ADMIN — LACOSAMIDE 200 MG: 100 TABLET, FILM COATED ORAL at 09:32

## 2024-12-28 RX ADMIN — Medication 1000 MCG: at 09:31

## 2024-12-28 ASSESSMENT — PAIN DESCRIPTION - LOCATION
LOCATION: HIP

## 2024-12-28 ASSESSMENT — PAIN SCALES - WONG BAKER
WONGBAKER_NUMERICALRESPONSE: NO HURT
WONGBAKER_NUMERICALRESPONSE: NO HURT

## 2024-12-28 ASSESSMENT — PAIN SCALES - GENERAL
PAINLEVEL_OUTOF10: 9
PAINLEVEL_OUTOF10: 5
PAINLEVEL_OUTOF10: 9
PAINLEVEL_OUTOF10: 2
PAINLEVEL_OUTOF10: 5
PAINLEVEL_OUTOF10: 0
PAINLEVEL_OUTOF10: 0

## 2024-12-28 ASSESSMENT — PAIN DESCRIPTION - DESCRIPTORS
DESCRIPTORS: DISCOMFORT

## 2024-12-28 ASSESSMENT — PAIN DESCRIPTION - ORIENTATION
ORIENTATION: LEFT

## 2024-12-28 NOTE — PROGRESS NOTES
Brent Degroot    : 1960  [id number]  MRN: [id number]              PM&R Progress Note      Admitting diagnosis: Left intertrochanteric hip fracture ([facility] 8.11)     Comorbid diagnoses impacting rehabilitation: Uncontrolled pain, generalized weakness, gait disturbance, acute blood loss anemia, seizure disorder, vascular dementia, dysthymia, history of CVA with chronic left hemiparesis.     Chief complaint: Anxious to get home.  Hip pain continues to impact his stride length with gait.    Prior (baseline) level of function: Independent.    Current level of function:         Current  IRF-MANNY and Goals:   Occupational Therapy:    Short Term Goals  Time Frame for Short Term Goals: STGs=LTGs :   Long Term Goals  Time Frame for Long Term Goals : 10 days or until d/c  Long Term Goal 1: Pt will complete grooming tasks Ind  Long Term Goal 2: Pt will complete total body bathing mod I  Long Term Goal 3: Pt will complete UB dressing Ind  Long Term Goal 4: Pt will complete LB dressing mod I using AE PRN  Long Term Goal 5: Pt will doff/don footwear mod I using AE PRN  Additional Goals?: Yes  Long Term Goal 6: Pt will complete toileting mod I  Long Term Goal 7: Pt will complete functional transfers (bed, chair, toilet, shower) c DME PRN and mod I  Long Term Goal 8: Pt will perform therex/therax to facilitate increased strength/endurance/ax tolerance (c emphasis on dynamic standing balance/tolerance >8 mins, BUE endurance) c SBA  Long Term Goal 9: Pt will complete simple homemaking tasks c DME PRN and S :                                       Eating: Eating  Assistance Needed: Independent  Comment: X  CARE Score: 6  Discharge Goal: Independent       Oral Hygiene: Oral Hygiene  Assistance Needed: Independent  Comment: seated at sink  CARE Score: 6  Discharge Goal: Independent    UB/LB Bathing: Shower/Bathe Self  Assistance Needed: Independent  Comment: performed full shower seated, uses LHS to wash LLE  CARE Score:  walker and C OT/PT is ***    Recommendations:    ***

## 2024-12-28 NOTE — PROGRESS NOTES
Rehab Therapy Aide Weekend Update    12/28/2024  Brent Degroot    Pt was mobilized this weekend under the guidance of PT/OT.    Time In: 925  Time Out: 955  Restrictions:    [x] Ok to see per NSG this date.  [x]  Gait belt used during activity and non-slip footwear        Pt was received [] Out of bed  [x] From Chair  Patient complaints: [x] No Complaints   [] Pain: (Location and level)   [] Nursing was advised  [] Nursing provided pain meds  Mobilization: [] Wheelchair level  [x] Rolling Walker  [] 4 Wheeled walker   [] Cane   [] No Device    Activity Completed:Ambulation for 650 feet w/ SBA using FWW. Seated rest break at about 180 feet for nurse to give pt meds  Patient Comments: When asked if pt was willing to get up and walk, \"Sure! Just give me about 10 steps to get my leg warmed up.\"    [] Pt taken to the toilet.     [] Pt assisted with hygiene     [] Pt assisted with clothing    Activity Tolerance:   [x] Tolerated activity with no adverse effects    [] Patient limited by fatigue  [] Patient limited by pain (location)   [] Patient limited by medical complications: Dizziness, SOB, Anxiety, Other (specify)        []  Nursing was notified and activity was stopped.   [] Adverse reaction to activity:   [] Significant change in status:    Safety upon return to room:    []  bed alarm set    [x]  chair alarm set    []  Telesitter activated     [x] Call light within reach     []Assisted in setting pt up with meal tray.    Communication with Nursing or therapy:      Jose Alejandro Jansen, Rehab Therapy Aide  10:05 AM 12/28/2024

## 2024-12-29 VITALS
DIASTOLIC BLOOD PRESSURE: 89 MMHG | RESPIRATION RATE: 18 BRPM | TEMPERATURE: 98.2 F | BODY MASS INDEX: 18.46 KG/M2 | OXYGEN SATURATION: 97 % | HEART RATE: 91 BPM | WEIGHT: 100.31 LBS | SYSTOLIC BLOOD PRESSURE: 136 MMHG | HEIGHT: 62 IN

## 2024-12-29 PROCEDURE — 6370000000 HC RX 637 (ALT 250 FOR IP): Performed by: PHYSICAL MEDICINE & REHABILITATION

## 2024-12-29 PROCEDURE — 97112 NEUROMUSCULAR REEDUCATION: CPT

## 2024-12-29 PROCEDURE — 6360000002 HC RX W HCPCS: Performed by: NURSE PRACTITIONER

## 2024-12-29 PROCEDURE — 1280000000 HC REHAB R&B

## 2024-12-29 PROCEDURE — 97110 THERAPEUTIC EXERCISES: CPT

## 2024-12-29 PROCEDURE — 97530 THERAPEUTIC ACTIVITIES: CPT

## 2024-12-29 PROCEDURE — 97116 GAIT TRAINING THERAPY: CPT

## 2024-12-29 PROCEDURE — 94761 N-INVAS EAR/PLS OXIMETRY MLT: CPT

## 2024-12-29 PROCEDURE — 6370000000 HC RX 637 (ALT 250 FOR IP): Performed by: NURSE PRACTITIONER

## 2024-12-29 PROCEDURE — 97535 SELF CARE MNGMENT TRAINING: CPT

## 2024-12-29 PROCEDURE — 97542 WHEELCHAIR MNGMENT TRAINING: CPT

## 2024-12-29 RX ADMIN — LEVETIRACETAM 1500 MG: 500 TABLET, FILM COATED ORAL at 07:57

## 2024-12-29 RX ADMIN — SENNOSIDES AND DOCUSATE SODIUM 1 TABLET: 50; 8.6 TABLET ORAL at 08:01

## 2024-12-29 RX ADMIN — FOLIC ACID 0.5 MG: 1 TABLET ORAL at 08:02

## 2024-12-29 RX ADMIN — OXYCODONE HYDROCHLORIDE 10 MG: 10 TABLET ORAL at 08:00

## 2024-12-29 RX ADMIN — DIVALPROEX SODIUM 500 MG: 500 TABLET, DELAYED RELEASE ORAL at 17:21

## 2024-12-29 RX ADMIN — ATORVASTATIN CALCIUM 40 MG: 40 TABLET, FILM COATED ORAL at 20:06

## 2024-12-29 RX ADMIN — SENNOSIDES AND DOCUSATE SODIUM 1 TABLET: 50; 8.6 TABLET ORAL at 20:06

## 2024-12-29 RX ADMIN — CLOPIDOGREL BISULFATE 75 MG: 75 TABLET ORAL at 08:01

## 2024-12-29 RX ADMIN — Medication 1000 MCG: at 08:02

## 2024-12-29 RX ADMIN — LEVETIRACETAM 1500 MG: 500 TABLET, FILM COATED ORAL at 20:05

## 2024-12-29 RX ADMIN — MEMANTINE 10 MG: 10 TABLET ORAL at 08:01

## 2024-12-29 RX ADMIN — OXYCODONE HYDROCHLORIDE 10 MG: 10 TABLET ORAL at 20:06

## 2024-12-29 RX ADMIN — ACETAMINOPHEN 650 MG: 325 TABLET ORAL at 12:34

## 2024-12-29 RX ADMIN — FAMOTIDINE 20 MG: 20 TABLET ORAL at 08:01

## 2024-12-29 RX ADMIN — ENOXAPARIN SODIUM 30 MG: 100 INJECTION SUBCUTANEOUS at 17:21

## 2024-12-29 RX ADMIN — LACOSAMIDE 200 MG: 100 TABLET, FILM COATED ORAL at 20:06

## 2024-12-29 RX ADMIN — MELATONIN TAB 3 MG 6 MG: 3 TAB at 20:05

## 2024-12-29 RX ADMIN — ACETAMINOPHEN 650 MG: 325 TABLET ORAL at 17:20

## 2024-12-29 RX ADMIN — FAMOTIDINE 20 MG: 20 TABLET ORAL at 20:05

## 2024-12-29 RX ADMIN — DONEPEZIL HYDROCHLORIDE 10 MG: 10 TABLET ORAL at 20:06

## 2024-12-29 RX ADMIN — ACETAMINOPHEN 650 MG: 325 TABLET ORAL at 20:05

## 2024-12-29 RX ADMIN — DIVALPROEX SODIUM 500 MG: 500 TABLET, DELAYED RELEASE ORAL at 08:04

## 2024-12-29 RX ADMIN — LACOSAMIDE 200 MG: 100 TABLET, FILM COATED ORAL at 08:01

## 2024-12-29 RX ADMIN — ACETAMINOPHEN 650 MG: 325 TABLET ORAL at 07:59

## 2024-12-29 RX ADMIN — MEMANTINE 10 MG: 10 TABLET ORAL at 20:05

## 2024-12-29 RX ADMIN — SERTRALINE HYDROCHLORIDE 200 MG: 50 TABLET ORAL at 07:57

## 2024-12-29 RX ADMIN — Medication 1000 MCG: at 20:06

## 2024-12-29 RX ADMIN — Medication 100 MG: at 08:04

## 2024-12-29 RX ADMIN — OXYCODONE HYDROCHLORIDE 10 MG: 10 TABLET ORAL at 13:19

## 2024-12-29 ASSESSMENT — PAIN - FUNCTIONAL ASSESSMENT
PAIN_FUNCTIONAL_ASSESSMENT: ACTIVITIES ARE NOT PREVENTED
PAIN_FUNCTIONAL_ASSESSMENT: ACTIVITIES ARE NOT PREVENTED
PAIN_FUNCTIONAL_ASSESSMENT: PREVENTS OR INTERFERES SOME ACTIVE ACTIVITIES AND ADLS
PAIN_FUNCTIONAL_ASSESSMENT: ACTIVITIES ARE NOT PREVENTED

## 2024-12-29 ASSESSMENT — PAIN DESCRIPTION - PAIN TYPE: TYPE: SURGICAL PAIN

## 2024-12-29 ASSESSMENT — PAIN SCALES - WONG BAKER
WONGBAKER_NUMERICALRESPONSE: NO HURT
WONGBAKER_NUMERICALRESPONSE: NO HURT

## 2024-12-29 ASSESSMENT — PAIN SCALES - GENERAL
PAINLEVEL_OUTOF10: 7
PAINLEVEL_OUTOF10: 0
PAINLEVEL_OUTOF10: 0
PAINLEVEL_OUTOF10: 4
PAINLEVEL_OUTOF10: 8
PAINLEVEL_OUTOF10: 2
PAINLEVEL_OUTOF10: 8
PAINLEVEL_OUTOF10: 2

## 2024-12-29 ASSESSMENT — PAIN DESCRIPTION - DESCRIPTORS
DESCRIPTORS: DISCOMFORT;DULL
DESCRIPTORS: DISCOMFORT;DULL
DESCRIPTORS: DISCOMFORT
DESCRIPTORS: DULL;DISCOMFORT

## 2024-12-29 ASSESSMENT — PAIN DESCRIPTION - ORIENTATION
ORIENTATION: LEFT

## 2024-12-29 ASSESSMENT — PAIN DESCRIPTION - LOCATION
LOCATION: HIP
LOCATION: LEG
LOCATION: LEG;HIP
LOCATION: LEG;HIP

## 2024-12-29 ASSESSMENT — PAIN DESCRIPTION - FREQUENCY: FREQUENCY: INTERMITTENT

## 2024-12-29 ASSESSMENT — PAIN DESCRIPTION - ONSET: ONSET: ON-GOING

## 2024-12-29 NOTE — PROGRESS NOTES
Occupational Therapy  Physical Rehabilitation: OCCUPATIONAL THERAPY     [x] daily progress note       [] discharge       Patient Name:  Brent Degroot   :  1960 MRN: [id number]  Room:  [address] [place] [address] Date of Admission: 2024  Rehabilitation Diagnosis:   Fracture of unspecified part of neck of left femur, initial encounter for closed fracture [S72.002A]  Closed intertrochanteric fracture of hip, left, initial encounter ([facility]) [S72.142A]       Date 2024       Day of ARU Week:  4   Time IN/OUT 9676-4926   Individual Tx Minutes 60   Group Tx Minutes 0   Co-Treat Minutes 0   Concurrent Tx Minutes 0   TOTAL Tx Time Mins 60   Variance Time 0   Variance Reason []   Refusal due to:     []   Medical hold/reason:    []   Illness   []   Off Unit for test/procedure  []   Extra time needed to complete task  []   Therapeutic need  []   Make up mins were attempted but pt unable to complete due to (specify)  []   Other (specify):   Restrictions Restrictions/Precautions  Restrictions/Precautions: General Precautions, Fall Risk, Weight Bearing (WBAT LLE, IV RUE)      Communication with other providers: [x]   OK to see per nursing:     []   Spoke with team member regarding:      Subjective observations and cognitive status:   Pt alert and oriented   Pain level/location:   6 /10       Location: L hip   Discharge recommendations  Anticipated discharge date:    Destination: []home alone   []home alone w assist prn   [] home w/ family    [x] Continuous supervision       []SNF    [] Assisted living     [] Other:   Continued therapy: [x]HHC OT  []OUTPATIENT  OT   [] No Further OT  Equipment needs: Brent Degroot requires the assistance of a tub transfer bench to successfully and safely complete bathing activities necessary due to reduced balance, endurance, need for ADL assist, and LE weakness and reduced ROM. These tasks cannot be safely completed without this device and would place Brent Degroot at greater risk of

## 2024-12-29 NOTE — PROGRESS NOTES
Intervention  Gait Training 30   Therapeutic Exercise 30   Neuro Re-Ed 15   Therapeutic Activity 30   Wheelchair Propulsion 15   Group    Other:    TOTAL 120         Social History  Social/Functional History  Lives With: Family (Brother (Tevin, retired))  Type of Home: House  Home Layout: One level (1 step into/out of rest of house from bedroom)  Home Access: Stairs to enter with rails  Entrance Stairs - Number of Steps: 3 ALEXSANDRA  Entrance Stairs - Rails: Both (can reach both simultaneously)  Bathroom Shower/Tub: Tub/Shower unit  Bathroom Toilet: Standard  Bathroom Equipment: Grab bars in shower, Grab bars around toilet  Home Equipment: Cane, Walker - Standard, Reacher, Sock aid, Long-handled shoehorn, Leg   Has the patient had two or more falls in the past year or any fall with injury in the past year?: Yes (1 fall resulting in L hip fx and this hospitalization)  Prior Level of Assist for ADLs: Independent  Prior Level of Assist for Homemaking: Independent  Homemaking Responsibilities: Yes  Meal Prep Responsibility: No  Laundry Responsibility: Primary  Cleaning Responsibility: Secondary (lighter cleaning)  Bill Paying/Finance Responsibility: Secondary  Shopping Responsibility: Secondary  Health Care Management: Primary (uses pillbox)  Prior Level of Assist for Ambulation: Independent household ambulator, with or without device, Independent community ambulator, with or without device (does not use AD at [facility])  Prior Level of Assist for Transfers: Independent  Active : No  Patient's  Info: hasn't driven since CVA  Mode of Transportation: Car  Occupation: On disability  Additional Comments: Pt typically sleeps in a flat, regular bed at home    Objective                                                                                    Goals:  (Update in navigator)  Short Term Goals  Time Frame for Short Term Goals: 7 days STG=LTG  Short Term Goal 1: Pt will complete all bed mobility with mod I  Short

## 2024-12-30 PROCEDURE — 94761 N-INVAS EAR/PLS OXIMETRY MLT: CPT

## 2024-12-30 PROCEDURE — 97116 GAIT TRAINING THERAPY: CPT

## 2024-12-30 PROCEDURE — 99232 SBSQ HOSP IP/OBS MODERATE 35: CPT | Performed by: PHYSICAL MEDICINE & REHABILITATION

## 2024-12-30 PROCEDURE — 97530 THERAPEUTIC ACTIVITIES: CPT

## 2024-12-30 PROCEDURE — 97535 SELF CARE MNGMENT TRAINING: CPT

## 2024-12-30 PROCEDURE — 6370000000 HC RX 637 (ALT 250 FOR IP): Performed by: NURSE PRACTITIONER

## 2024-12-30 PROCEDURE — 6370000000 HC RX 637 (ALT 250 FOR IP): Performed by: PHYSICAL MEDICINE & REHABILITATION

## 2024-12-30 PROCEDURE — 1280000000 HC REHAB R&B

## 2024-12-30 PROCEDURE — 97110 THERAPEUTIC EXERCISES: CPT

## 2024-12-30 RX ORDER — SENNA AND DOCUSATE SODIUM 50; 8.6 MG/1; MG/1
1 TABLET, FILM COATED ORAL 2 TIMES DAILY
COMMUNITY
Start: 2024-12-30

## 2024-12-30 RX ORDER — OXYCODONE HYDROCHLORIDE 5 MG/1
5 TABLET ORAL EVERY 6 HOURS PRN
Qty: 20 TABLET | Refills: 0 | Status: SHIPPED | OUTPATIENT
Start: 2024-12-30 | End: 2025-01-06

## 2024-12-30 RX ORDER — OXYCODONE HYDROCHLORIDE 5 MG/1
5 TABLET ORAL EVERY 6 HOURS PRN
Status: DISCONTINUED | OUTPATIENT
Start: 2024-12-30 | End: 2024-12-31 | Stop reason: HOSPADM

## 2024-12-30 RX ADMIN — CLOPIDOGREL BISULFATE 75 MG: 75 TABLET ORAL at 10:31

## 2024-12-30 RX ADMIN — ATORVASTATIN CALCIUM 40 MG: 40 TABLET, FILM COATED ORAL at 21:05

## 2024-12-30 RX ADMIN — LACOSAMIDE 200 MG: 100 TABLET, FILM COATED ORAL at 10:31

## 2024-12-30 RX ADMIN — DONEPEZIL HYDROCHLORIDE 10 MG: 10 TABLET ORAL at 21:05

## 2024-12-30 RX ADMIN — DIVALPROEX SODIUM 500 MG: 500 TABLET, DELAYED RELEASE ORAL at 17:17

## 2024-12-30 RX ADMIN — ACETAMINOPHEN 650 MG: 325 TABLET ORAL at 21:05

## 2024-12-30 RX ADMIN — FAMOTIDINE 20 MG: 20 TABLET ORAL at 21:04

## 2024-12-30 RX ADMIN — DIVALPROEX SODIUM 500 MG: 500 TABLET, DELAYED RELEASE ORAL at 10:30

## 2024-12-30 RX ADMIN — OXYCODONE HYDROCHLORIDE 10 MG: 10 TABLET ORAL at 05:16

## 2024-12-30 RX ADMIN — Medication 1000 MCG: at 10:31

## 2024-12-30 RX ADMIN — SENNOSIDES AND DOCUSATE SODIUM 1 TABLET: 50; 8.6 TABLET ORAL at 10:31

## 2024-12-30 RX ADMIN — LEVETIRACETAM 1500 MG: 500 TABLET, FILM COATED ORAL at 21:05

## 2024-12-30 RX ADMIN — LACOSAMIDE 200 MG: 100 TABLET, FILM COATED ORAL at 21:05

## 2024-12-30 RX ADMIN — FOLIC ACID 0.5 MG: 1 TABLET ORAL at 10:32

## 2024-12-30 RX ADMIN — SERTRALINE HYDROCHLORIDE 200 MG: 50 TABLET ORAL at 10:31

## 2024-12-30 RX ADMIN — MELATONIN TAB 3 MG 6 MG: 3 TAB at 21:05

## 2024-12-30 RX ADMIN — Medication 100 MG: at 10:32

## 2024-12-30 RX ADMIN — FAMOTIDINE 20 MG: 20 TABLET ORAL at 10:31

## 2024-12-30 RX ADMIN — OXYCODONE HYDROCHLORIDE 10 MG: 10 TABLET ORAL at 10:30

## 2024-12-30 RX ADMIN — ACETAMINOPHEN 650 MG: 325 TABLET ORAL at 17:17

## 2024-12-30 RX ADMIN — MEMANTINE 10 MG: 10 TABLET ORAL at 10:32

## 2024-12-30 RX ADMIN — OXYCODONE HYDROCHLORIDE 5 MG: 5 TABLET ORAL at 17:17

## 2024-12-30 RX ADMIN — Medication 1000 MCG: at 21:05

## 2024-12-30 RX ADMIN — LEVETIRACETAM 1500 MG: 500 TABLET, FILM COATED ORAL at 10:31

## 2024-12-30 RX ADMIN — ACETAMINOPHEN 650 MG: 325 TABLET ORAL at 10:30

## 2024-12-30 RX ADMIN — MEMANTINE 10 MG: 10 TABLET ORAL at 23:12

## 2024-12-30 ASSESSMENT — PAIN DESCRIPTION - ORIENTATION
ORIENTATION: RIGHT
ORIENTATION: LEFT
ORIENTATION: LEFT
ORIENTATION: LEFT;RIGHT
ORIENTATION: LEFT

## 2024-12-30 ASSESSMENT — PAIN SCALES - GENERAL
PAINLEVEL_OUTOF10: 8
PAINLEVEL_OUTOF10: 10
PAINLEVEL_OUTOF10: 4
PAINLEVEL_OUTOF10: 8
PAINLEVEL_OUTOF10: 9
PAINLEVEL_OUTOF10: 9

## 2024-12-30 ASSESSMENT — PAIN DESCRIPTION - ONSET
ONSET: ON-GOING

## 2024-12-30 ASSESSMENT — PAIN DESCRIPTION - LOCATION
LOCATION: ARM
LOCATION: HIP;KNEE
LOCATION: HIP
LOCATION: HIP;KNEE
LOCATION: HIP

## 2024-12-30 ASSESSMENT — PAIN DESCRIPTION - PAIN TYPE
TYPE: ACUTE PAIN
TYPE: SURGICAL PAIN
TYPE: ACUTE PAIN

## 2024-12-30 ASSESSMENT — PAIN DESCRIPTION - DESCRIPTORS
DESCRIPTORS: DISCOMFORT
DESCRIPTORS: ACHING;DISCOMFORT
DESCRIPTORS: ACHING
DESCRIPTORS: ACHING;DISCOMFORT
DESCRIPTORS: ACHING

## 2024-12-30 ASSESSMENT — PAIN DESCRIPTION - FREQUENCY
FREQUENCY: INTERMITTENT

## 2024-12-30 ASSESSMENT — PAIN - FUNCTIONAL ASSESSMENT
PAIN_FUNCTIONAL_ASSESSMENT: PREVENTS OR INTERFERES SOME ACTIVE ACTIVITIES AND ADLS

## 2024-12-30 NOTE — CARE COORDINATION
Discussed discharge with patient. Notified him that CMHC and 2WW (DASCO) have been ordered. BIMS completed and provided a list of follow up appointments. Provided a copy of the Important Message from Medicare form signed upon admission. Patient verbalized understanding.

## 2024-12-30 NOTE — PROGRESS NOTES
with chronic ataxia and left hemiparesis: Continuing his antiplatelet therapy with Plavix and a statin.  Monitoring his blood pressure.  Dysthymia: His endurance is slowly building.  Continuing Zoloft at high dose.  Cautious modification of sleep patterns with melatonin.  Continue the B12, folic acid and B6 supplements.

## 2024-12-30 NOTE — PROGRESS NOTES
Level of Assist for Ambulation: Independent household ambulator, with or without device, Independent community ambulator, with or without device (does not use AD at [facility])  Prior Level of Assist for Transfers: Independent  Active : No  Patient's  Info: hasn't driven since CVA  Mode of Transportation: Car  Occupation: On disability  Additional Comments: Pt typically sleeps in a flat, regular bed at home    Objective                                                                                    Goals:  (Update in navigator)  Short Term Goals  Time Frame for Short Term Goals: 7 days STG=LTG  Short Term Goal 1: Pt will complete all bed mobility with mod I  Short Term Goal 2: Pt will complete all functional transfers with mod I  Short Term Goal 3: Pt will ambulate with 2ww on level surface 150' and 10' on unlevel surface with mod I  Short Term Goal 4: Pt will negotiate curb step with 2ww and 4 steps with B rails with mod I, 12 steps with B rails with supervision  Short Term Goal 5: Pt will  object from floor with 2ww and reacher with mod I:   :        Plan of Care                                                                              Times per week: 5 days per week for a minimum of 60 minutes/day plus group as appropriate for 60 minutes.  Treatment to include Current Treatment Recommendations: Strengthening, ROM, Balance training, Functional mobility training, Transfer training, Endurance training, IADL training, Stair training, Gait training, Neuromuscular re-education, Manual, Patient/Caregiver education & training, Pain management, Safety education & training, Home exercise program, Equipment evaluation, education, & procurement, Positioning, Modalities, Group Therapy, Therapeutic activities    Electronically signed by   Suzan Mata PT,   12/30/2024, 12:38 PM

## 2024-12-30 NOTE — PROGRESS NOTES
Occupational Therapy    Physical Rehabilitation: OCCUPATIONAL THERAPY     [] daily progress note       [x] discharge       Patient Name:  Brent Degroot   :  1960 MRN: [id number]  Room:  [address] [place] [address] Date of Admission: 2024  Rehabilitation Diagnosis:   Fracture of unspecified part of neck of left femur, initial encounter for closed fracture [S72.002A]  Closed intertrochanteric fracture of hip, left, initial encounter ([facility]) [S72.142A]       Date 2024       Day of ARU Week:  5   Time IN/OUT 3467-0390   Individual Tx Minutes 120   Group Tx Minutes    Co-Treat Minutes    Concurrent Tx Minutes    TOTAL Tx Time Mins 120   Variance Time    Variance Reason []   Refusal due to:     []   Medical hold/reason:    []   Illness   []   Off Unit for test/procedure  []   Extra time needed to complete task  []   Therapeutic need  []   Make up mins were attempted but pt unable to complete due to (specify)  []   Other (specify):   Restrictions Restrictions/Precautions: General Precautions, Fall Risk, Weight Bearing (WBAT LLE, IV RUE)         Communication with other providers: [x]   OK to see per nursing:     []   Spoke with team member regarding:      Subjective observations and cognitive status: Pt resting in bed on approach; pleasant and agreeable to therapy.      Pain level/location:    /10       Location: did not rate but \"I'm hurting pretty bad\"   Discharge recommendations  Anticipated discharge date:    Destination: []home alone   []home alone w assist prn   [] home w/ family    [x] Continuous supervision       []SNF    [] Assisted living     [] Other:   Continued therapy: [x]HHC OT  []OUTPATIENT  OT   [] No Further OT  Equipment needs: Brent Degroot requires the assistance of a tub transfer bench to successfully and safely complete bathing activities necessary due to reduced balance, endurance, need for ADL assist, and LE weakness and reduced ROM. These tasks cannot be safely completed without this

## 2024-12-30 NOTE — DISCHARGE INSTRUCTIONS
Your information:  Name: Brent Degroot  : 1960    Your instructions:    Pt is discharging to home with [facility]  [address] , [address], [place] [address] [phone]  A representative from [facility] will contact you at home to schedule your home care needs.    Pt will discharge home with medical supplies from   [facility]   [address] [place] [address] [phone]    What to do after you leave the hospital:    Recommended diet: regular diet    Recommended activity: activity as tolerated    The following personal items were collected during your admission and were returned to you:    Belongings  Dental Appliances: Uppers, Lowers, At home (pt stated brother is bringing dentures in)  Vision - Corrective Lenses: Eyeglasses, At bedside (reading glasses)  Hearing Aid: None  Clothing: Footwear, Socks, Shirt, Shorts, Undergarments, Pants, At bedside  Jewelry: None  Body Piercings Removed: N/A  Electronic Devices: Cell Phone,   Weapons (Notify Protective Services/Security): None  Home Medications: None  Valuables Given To: Patient  Provide Name(s) of Who Valuable(s) Were Given To: patient    Information obtained by:  By signing below, I understand that if any problems occur once I leave the hospital I am to contact 911 or primary care physician.  I understand and acknowledge receipt of the instructions indicated above.

## 2024-12-30 NOTE — PATIENT CARE CONFERENCE
Claudio, Medical Director    []  Dr Kevin Dodd, Covering Medical Director  []  Dr Shabbir Devlin, Covering Medical Director    [x] Chris Lewis, DPT,         [] Manolo Morgan, RN Nurse Manager   [] Fredy Artis RN Nurse Manager     []  Julia Allen, PT  [x]  Suzan Mata, PT       [x] Lory Valencia, OT   [] Henry Caicedor, OT  [] Cristina Leblanc, OT     [x]  Yasemin Quintanilla, CARITO    []  Kenna Guerrero, CARTIO   [] Roslyn Cassidy, CARITO     []Devora Stacy,   [x]Suzan OVALLE,      [] Brennan Lackey RN   [x] Sienna Elkins, BHARGAVI    [] Olayinka Nelson RN    [] Becky Kent RN []     I have led this Team Conference and agree with the plan,  JUAN Snyder MD, 12/31/2024, 12:58 PM  []   I, the Medical Director, was required to lead today's conference via telephone due to an unanticipated scheduling conflict.  I agree with the decisions made by the inter-disciplinary team at today's meeting.      Goals have been updated to reflect recent status.    Team conference note transcribed this date by: Yasemin Quintanilla MA, CCC-SLP, Therapy Coordinator

## 2024-12-30 NOTE — DISCHARGE INSTR - ACTIVITY
Pt is discharging to home with [facility]  [address], [address], [place] [address]  [phone]  A representative from [facility] will contact you at home to schedule your home care needs.    Pt will discharge home with medical supplies from   [facility]   [address] [place] [address] [phone]    Meals to [facility] [phone]

## 2024-12-31 VITALS
HEIGHT: 62 IN | HEART RATE: 86 BPM | TEMPERATURE: 97.8 F | BODY MASS INDEX: 18.74 KG/M2 | RESPIRATION RATE: 18 BRPM | SYSTOLIC BLOOD PRESSURE: 128 MMHG | OXYGEN SATURATION: 96 % | DIASTOLIC BLOOD PRESSURE: 88 MMHG | WEIGHT: 101.85 LBS

## 2024-12-31 LAB
BASOPHILS # BLD: 0.09 K/UL
BASOPHILS NFR BLD: 1 % (ref 0–1)
EOSINOPHIL # BLD: 0.1 K/UL
EOSINOPHILS RELATIVE PERCENT: 1 % (ref 0–3)
ERYTHROCYTE [DISTWIDTH] IN BLOOD BY AUTOMATED COUNT: 14.5 % (ref 11.7–14.9)
HCT VFR BLD AUTO: 33.7 % (ref 42–52)
HGB BLD-MCNC: 10.7 G/DL (ref 13.5–18)
IMM GRANULOCYTES # BLD AUTO: 0.03 K/UL
IMM GRANULOCYTES NFR BLD: 0 %
LYMPHOCYTES NFR BLD: 2.23 K/UL
LYMPHOCYTES RELATIVE PERCENT: 27 % (ref 24–44)
MCH RBC QN AUTO: 32.4 PG (ref 27–31)
MCHC RBC AUTO-ENTMCNC: 31.8 G/DL (ref 32–36)
MCV RBC AUTO: 102.1 FL (ref 78–100)
MONOCYTES NFR BLD: 0.75 K/UL
MONOCYTES NFR BLD: 9 % (ref 0–4)
NEUTROPHILS NFR BLD: 62 % (ref 36–66)
NEUTS SEG NFR BLD: 5.13 K/UL
PLATELET # BLD AUTO: 591 K/UL (ref 140–440)
PMV BLD AUTO: 8.9 FL (ref 7.5–11.1)
RBC # BLD AUTO: 3.3 M/UL (ref 4.6–6.2)
WBC OTHER # BLD: 8.3 K/UL (ref 4–10.5)

## 2024-12-31 PROCEDURE — 6370000000 HC RX 637 (ALT 250 FOR IP): Performed by: NURSE PRACTITIONER

## 2024-12-31 PROCEDURE — 85025 COMPLETE CBC W/AUTO DIFF WBC: CPT

## 2024-12-31 PROCEDURE — 94761 N-INVAS EAR/PLS OXIMETRY MLT: CPT

## 2024-12-31 PROCEDURE — 99239 HOSP IP/OBS DSCHRG MGMT >30: CPT | Performed by: PHYSICAL MEDICINE & REHABILITATION

## 2024-12-31 PROCEDURE — 36415 COLL VENOUS BLD VENIPUNCTURE: CPT

## 2024-12-31 PROCEDURE — 6370000000 HC RX 637 (ALT 250 FOR IP): Performed by: PHYSICAL MEDICINE & REHABILITATION

## 2024-12-31 RX ADMIN — LEVETIRACETAM 1500 MG: 500 TABLET, FILM COATED ORAL at 09:03

## 2024-12-31 RX ADMIN — FAMOTIDINE 20 MG: 20 TABLET ORAL at 09:03

## 2024-12-31 RX ADMIN — DIVALPROEX SODIUM 500 MG: 500 TABLET, DELAYED RELEASE ORAL at 09:10

## 2024-12-31 RX ADMIN — OXYCODONE HYDROCHLORIDE 5 MG: 5 TABLET ORAL at 09:10

## 2024-12-31 RX ADMIN — SERTRALINE HYDROCHLORIDE 200 MG: 50 TABLET ORAL at 09:04

## 2024-12-31 RX ADMIN — Medication 100 MG: at 09:10

## 2024-12-31 RX ADMIN — FOLIC ACID 0.5 MG: 1 TABLET ORAL at 09:03

## 2024-12-31 RX ADMIN — LACOSAMIDE 200 MG: 100 TABLET, FILM COATED ORAL at 09:03

## 2024-12-31 RX ADMIN — Medication 1000 MCG: at 09:04

## 2024-12-31 RX ADMIN — CLOPIDOGREL BISULFATE 75 MG: 75 TABLET ORAL at 09:03

## 2024-12-31 RX ADMIN — ACETAMINOPHEN 650 MG: 325 TABLET ORAL at 09:04

## 2024-12-31 RX ADMIN — SENNOSIDES AND DOCUSATE SODIUM 1 TABLET: 50; 8.6 TABLET ORAL at 09:04

## 2024-12-31 RX ADMIN — MEMANTINE 10 MG: 10 TABLET ORAL at 09:03

## 2024-12-31 ASSESSMENT — PAIN SCALES - GENERAL: PAINLEVEL_OUTOF10: 7

## 2024-12-31 ASSESSMENT — PAIN DESCRIPTION - DESCRIPTORS: DESCRIPTORS: ACHING;DISCOMFORT

## 2024-12-31 ASSESSMENT — PAIN DESCRIPTION - LOCATION: LOCATION: LEG;HIP

## 2024-12-31 ASSESSMENT — PAIN DESCRIPTION - ORIENTATION: ORIENTATION: LEFT

## 2024-12-31 NOTE — DISCHARGE SUMMARY
Patient Name: Brent Degroot  Patient :  1960  Patient MRN:   [id number]      Admission Date:  2024  Discharge Date: 2024    Admitting diagnosis: Left intertrochanteric hip fracture ([facility] 8.11)     Comorbid diagnoses impacting rehabilitation: Uncontrolled pain, generalized weakness, gait disturbance, acute blood loss anemia, seizure disorder, vascular dementia, dysthymia, history of CVA with chronic left hemiparesis.    Discharging diagnosis: Left intertrochanteric hip fracture ([facility] 8.11)     Comorbid diagnoses impacting rehabilitation: Uncontrolled pain, generalized weakness, gait disturbance, acute blood loss anemia, seizure disorder, vascular dementia, dysthymia, history of CVA with chronic left hemiparesis.     History of present illness: The patient is a 64-year-old right-hand-dominant male with chronic clumsiness with his seizure disorder and past stroke with ataxia and left hemiparesis.  On 2024 he was descending stairs at home when he \"missed the last step\" and fell to the ground landing hard onto his left side.  He denied striking his head, losing consciousness or experiencing palpitations over dizziness prior to the injury.  He was unable to stand by himself due to leg pain.  He was urgently brought to our ED where a minimally displaced left intertrochanteric hip fracture was identified. The next day Dr. Jones performed a left hip intramedullary nail fixation of the fracture.  He allowed weightbearing as tolerated but the patient tolerated it poorly.  His uncontrolled pain, chronic left-sided weakness and ataxia made his recovery quite complicated.  He did not have any perioperative seizure activity, fever or other evidence of infection.  Unfortunately, on admission to the rehab unit, he was unable to do his own toileting, transfers and self-care.     Prior (baseline) level of function: Independent.    Current level of function:         Current  IRF-MANNY and Goals:   Occupational

## 2024-12-31 NOTE — CARE COORDINATION
ARU  Discharge Summary    D/C Date: 12/31/2024    Patient discharged to: Home with brother    Transported by: Brother    Referrals made to: [facility] and KAPIL OVALLE spoke with Breanna from [facility] and made a referral for Meals to [facility].     Additional information: Follow up appointments scheduled with Cole Jones MD Thursday Jan 2, 2025 and Damon Esquivel MD Friday Clay 10, 2025 2:40 PM. Patient notified, AVS updated    Caregiver training: none requested    Discharge BIMS completed: [x]      IMM: [x]       Discharge clinical was sent to [facility] via routed fax 3:22 PM.     Discharge Assessment: At the time of discharge   [x] PT Note 12/30 -Patient fully participated in the program and met established goals.   [x] OT Note 12/30 -Patient fully participated in the program and made good progress towards established goals.  Pt demo'ed some impulsivity and visual perceptual deficits from remote CVA.

## 2024-12-31 NOTE — PROGRESS NOTES
Staples removed from 4 incisions in the hip area. Sites dabbled with betadine swabs and dried with gauze.

## 2025-03-07 NOTE — FLOWSHEET NOTE
Outpatient Physical Therapy  [place]           [x] Phone: [phone]   Fax: [phone]  [place]           [] Phone: [phone]   Fax: [phone]        Physical Therapy Daily Treatment Note  Date:  3/10/2025    Patient Name:  Brent Degroot    :  1960  MRN: [id number]  Restrictions/Precautions: Restrictions/Precautions: General Precautions; Fall Risk; Weight Bearing (WBAT LLE, IV RUE)        Diagnosis:   Displaced intertrochanteric fracture of left femur, sequela [S72.142S] Diagnosis: S72.142S (ICD-10-CM) - Displaced intertrochanteric fracture of left femur, sequela  Date of Injury/Surgery:   Treatment Diagnosis:  LLE strength deficits, gait impairement  Insurance/Certification information: [facility] Medicare  Referring Physician:  Cole Jones MD Schneider, Andrew D, MD   PCP: Damon Esquivel MD  Next Doctor Visit:    Plan of care signed (Y/N):  sent day of eval   Outcome Measure: LEFs, 5STS, TUG  Visit# / total visits: -   BOMN  Pain level: /10   Goals:     Patient goals: \"get stronger and back to what i was doing\"    Long Term Goals  Time Frame for Long Term Goals: 6 weeks  Patient will demo independence with HEP .  Patient will improve global LLE strength to >4+/5 to assist him with community negotiation.  Patient will demonstrate ability to navigate flight of stairs with reciprical pattern and UE assist to allow him to navigate his basement stairs and participate in his hobby.  Patient will improve TUG from 24s with SPC to <20s with no AD to demonstrate household negotiation at [facility].  Patient will improve 5STS  to <13s and demonstrate good eccentric control.      Summary of Evaluation:   Patient is a 64 year old male who presents to physical therapy status post left intertrochanteric ORIF on 24. He does have history of CVA and was ambulating with wider gait, but no AD prior to surgery. Patient reports he suffered a traumatic fall onto his car, which

## 2025-03-07 NOTE — PLAN OF CARE
Outpatient Physical Therapy           [place]           [x] Phone: [phone]   Fax: [phone]  [place]           [] Phone: [phone]   Fax: [phone]     To: Cole Jones MD Schneider, Andrew D, MD   From: Yuko Carlson, PT, DPT     Patient: Brent Degroot       : 1960  Diagnosis: Displaced intertrochanteric fracture of left femur, sequela [S72.142S] Diagnosis: S72.142S (ICD-10-CM) - Displaced intertrochanteric fracture of left femur, sequela  Treatment Diagnosis: LLE strength deficits, gait impairement  Date: 3/10/2025    Physical Therapy Certification/Re-Certification Form  Dear Dr. Jones,   The following patient has been evaluated for physical therapy services and for therapy to continue, insurance requires physician review of the treatment plan initially and every 90 days. Please review the attached evaluation and/or summary of the patient's plan of care, and verify that you agree therapy should continue by signing the attached document and sending it back to our office.    Assessment:     Patient is a 64 year old male who presents to physical therapy status post left intertrochanteric ORIF on 24. He does have history of CVA and was ambulating with wider gait, but no AD prior to surgery. Patient reports he suffered a traumatic fall onto his car, which resulted in breaking his hip. He spent time in inpatient rehab and with home ronda prior to this encounter. He reports greatest difficulties are still stair negotiation, ambulation, and pain management. Upon assessment, he demonstrates left>right LE weakness. Left hip ROM is mildly limited at this time compared to his unaffected. He would benefit from ongoing skilled physical therapy to address deficits, return to PLOF, and reduce risk for further functional decline.    Plan of Care/Treatment to date:  [x] Therapeutic Exercise  [x] Modalities:  [x] Therapeutic Activity     [] Ultrasound  [] Electrical Stimulation  [x] Gait

## 2025-03-07 NOTE — PROGRESS NOTES
visits since your last visit?: No      Learning/Language: Learning  Does the patient/guardian have any barriers to learning?: No barriers  Will there be a co-learner?: No  What is the preferred language of the patient/guardian?: English  Is an  required?: No  How does the patient/guardian prefer to learn new concepts?: Listening, Demonstration     Pain Screening   Pain Screening  Patient Currently in Pain: Yes  Pain Assessment: 0-10  Pain Level: 6  Best Pain Level: 6  Worst Pain Level: 10  Post Treatment Pain Level: 4  Pain Type: Surgical pain  Pain Location: Hip  Pain Orientation: Left  Pain Descriptors: Aching    Functional Status    Dominant Hand: : Right    Social History:  Social History  Lives With: Family  Type of Home: House  Home Layout: One level, Laundry in basement  Home Access: Stairs to enter with rails  Entrance Stairs - Rails: Both  Entrance Stairs - Number of Steps: 2  Bathroom Shower/Tub: Tub/Shower unit  Bathroom Toilet: Standard  Bathroom Equipment: Grab bars in shower    Occupation/Interests:  Occupation: Retired  Type of Occupation: retired  Leisure & Hobbies: workshop in basement, [employer] radio controls    Prior Level of Function:  ind Independent        Current Level of Function:  ind      Receives Help From: Family  Prior Level of Assist for ADLs: Independent  Prior Level of Assist for Homemaking: Independent  Homemaking Responsibilities: Yes  Ambulation Assistance: Independent  Prior Level of Assist for Transfers: Independent  Active : No  Patient's  Info: brother  Mode of Transportation: Car    OBJECTIVE EXAMINATION       Review of Systems:  Vision: Impaired  Visual Deficits:  (has blind spot in right eye)  Hearing: Within functional limits  Overall Orientation Status: Within Functional Limits  Patient affect:: Normal  Follows Commands: Within Functional Limits      Regional Screen:   Lumbar Screen: WFL  Knee Screen: WFL     Observations:  General

## 2025-03-10 ENCOUNTER — HOSPITAL ENCOUNTER (OUTPATIENT)
Dept: PHYSICAL THERAPY | Age: 65
Setting detail: THERAPIES SERIES
Discharge: HOME | End: 2025-03-10
Payer: MEDICARE

## 2025-03-10 PROCEDURE — 97110 THERAPEUTIC EXERCISES: CPT

## 2025-03-10 PROCEDURE — 97161 PT EVAL LOW COMPLEX 20 MIN: CPT

## 2025-03-10 ASSESSMENT — PAIN DESCRIPTION - LOCATION: LOCATION: HIP

## 2025-03-10 ASSESSMENT — PAIN DESCRIPTION - PAIN TYPE: TYPE: SURGICAL PAIN

## 2025-03-10 ASSESSMENT — PAIN SCALES - GENERAL: PAINLEVEL_OUTOF10: 6

## 2025-03-10 ASSESSMENT — PAIN DESCRIPTION - ORIENTATION: ORIENTATION: LEFT

## 2025-03-10 ASSESSMENT — PAIN DESCRIPTION - DESCRIPTORS: DESCRIPTORS: ACHING

## 2025-03-20 ENCOUNTER — HOSPITAL ENCOUNTER (OUTPATIENT)
Dept: PHYSICAL THERAPY | Age: 65
Setting detail: THERAPIES SERIES
Discharge: HOME | End: 2025-03-20
Payer: MEDICARE

## 2025-03-20 NOTE — FLOWSHEET NOTE
Physical Therapy  Cancellation/No-show Note  Patient Name:  Brent Degroot  :  1960   Date:  3/20/2025  Cancelled visits to date: 0  No-shows to date: 1    For today's appointment patient:  []  Cancelled  []  Rescheduled appointment  [x]  No-show     Reason given by patient:  []  Patient ill  []  Conflicting appointment  []  No transportation    []  Conflict with work  [x]  No reason given  []  Other:     Comments:      Electronically signed by:  Saloni Holland PTA, ADRIANO

## 2025-03-26 ENCOUNTER — HOSPITAL ENCOUNTER (OUTPATIENT)
Dept: PHYSICAL THERAPY | Age: 65
Setting detail: THERAPIES SERIES
Discharge: HOME | End: 2025-03-26
Payer: MEDICARE

## 2025-03-26 ENCOUNTER — TELEPHONE (OUTPATIENT)
Dept: ORTHOPEDIC SURGERY | Age: 65
End: 2025-03-26

## 2025-03-26 NOTE — TELEPHONE ENCOUNTER
Called patient to move their appointment from OA location to [facility] on 6/13 for the left hip.

## 2025-04-02 ENCOUNTER — HOSPITAL ENCOUNTER (OUTPATIENT)
Dept: PHYSICAL THERAPY | Age: 65
Setting detail: THERAPIES SERIES
Discharge: HOME | End: 2025-04-02
Payer: MEDICARE

## 2025-04-02 PROCEDURE — 97110 THERAPEUTIC EXERCISES: CPT

## 2025-04-02 PROCEDURE — 97112 NEUROMUSCULAR REEDUCATION: CPT

## 2025-04-02 NOTE — FLOWSHEET NOTE
Outpatient Physical Therapy  [place]           [x] Phone: [phone]   Fax: [phone]  [place]           [] Phone: [phone]   Fax: [phone]        Physical Therapy Daily Treatment Note  Date:  2025    Patient Name:  Brent Degroot    :  1960  MRN: [id number]  Restrictions/Precautions: Restrictions/Precautions: General Precautions; Fall Risk; Weight Bearing (WBAT LLE, IV RUE)        Diagnosis:   Displaced intertrochanteric fracture of left femur, sequela [S72.142S]    Date of Injury/Surgery:   Treatment Diagnosis:     Insurance/Certification information:    Referring Physician:  Cole Jones MD     PCP: Damon Esquivel MD  Next Doctor Visit:    Plan of care signed (Y/N):  sent day of eval   Outcome Measure: LEFs, 5STS, TUG  Visit# / total visits:  -   BOMN  Pain level: 4/10   Goals:     Patient goals: \"get stronger and back to what i was doing\"    Long Term Goals  Time Frame for Long Term Goals: 6 weeks  Patient will demo independence with HEP .  Patient will improve global LLE strength to >4+/5 to assist him with community negotiation.  Patient will demonstrate ability to navigate flight of stairs with reciprical pattern and UE assist to allow him to navigate his basement stairs and participate in his hobby.  Patient will improve TUG from 24s with SPC to <20s with no AD to demonstrate household negotiation at [facility].  Patient will improve 5STS  to <13s and demonstrate good eccentric control.      Summary of Evaluation:   Patient is a 64 year old male who presents to physical therapy status post left intertrochanteric ORIF on 24. He does have history of CVA and was ambulating with wider gait, but no AD prior to surgery. Patient reports he suffered a traumatic fall onto his car, which resulted in breaking his hip. He spent time in inpatient rehab and with home ronda prior to this encounter. He reports greatest difficulties are still stair negotiation,

## 2025-04-09 ENCOUNTER — HOSPITAL ENCOUNTER (OUTPATIENT)
Dept: PHYSICAL THERAPY | Age: 65
Setting detail: THERAPIES SERIES
Discharge: HOME | End: 2025-04-09
Payer: MEDICARE

## 2025-04-09 NOTE — FLOWSHEET NOTE
Physical Therapy  Cancellation/No-show Note  Patient Name:  Brent Degroot  :  1960   Date:  2025  Cancelled visits to date: 2  No-shows to date: 0    For today's appointment patient:  [x]  Cancelled  []  Rescheduled appointment  []  No-show     Reason given by patient:  [x]  Patient ill  []  Conflicting appointment  []  No transportation    []  Conflict with work  []  No reason given  []  Other:     Comments:      Electronically signed by:  Yuko Carlson PT, DPT

## 2025-04-15 ENCOUNTER — HOSPITAL ENCOUNTER (OUTPATIENT)
Dept: PHYSICAL THERAPY | Age: 65
Setting detail: THERAPIES SERIES
Discharge: HOME | End: 2025-04-15
Payer: MEDICARE

## 2025-04-15 PROCEDURE — 97530 THERAPEUTIC ACTIVITIES: CPT

## 2025-04-15 PROCEDURE — 97110 THERAPEUTIC EXERCISES: CPT

## 2025-04-15 NOTE — DISCHARGE SUMMARY
Outpatient Physical Therapy           [place]           [x] Phone: [phone]   Fax: [phone]  [place]           [] Phone: [phone]   Fax: [phone]      To: Cole Jones MD     From: Yuko Carlson, PT, DPT     Patient: Brent Degroot                    : 1960  Diagnosis:  Displaced intertrochanteric fracture of left femur, sequela [S72.142S]        Treatment Diagnosis:   LLE strength deficits, gait impairement    Date: 4/15/2025  []  Progress Note                [x]  Discharge Note    Evaluation Date:  3/10/25   Total Visits to date:  3  Cancels/No-shows to date:  4    Subjective:    \"I have been doing pretty good, I rode my bike a lot yesterday so  my muscles are pretty sore. I still walk with a bit of a limp too. I am back to doing everything I was doing and then some. I think we can make today last one. I will get to doing more with the weather being nicer too.  \"      L hip pain: 0 pain \"muscle soreness from riding bike ride\"    Plan of Care/Treatment to date:  [x] Therapeutic Exercise    [x] Modalities:  [x] Therapeutic Activity     [] Ultrasound  [] Electrical Stimulation  [x] Gait Training      [] Cervical Traction   [] Lumbar Traction  [x] Neuromuscular Re-education  [x] Cold/hotpack [] Iontophoresis  [x] Instruction in HEP      Other:  [x] Manual Therapy       [x]  Vasopneumatic  [] Aquatic Therapy       []   Dry Needle Therapy                      Objective/Significant Findings At Last Visit/Comments:    4/15/25  5STS: 13.2s no UE   TU.8s , still mild antalgic gait with wider URI      LE MMT  Hip FLX B 4+/5  Hip ABD B 4+/5  Hip ADD B 5/5  Hip ER B 4+/5  Hip IR  B 4+/5  Knee FLX B 5/5  Knee EXT B 5/5  LEFS : 68/80      Arrived amb w/o A device with wide URI, mild antalgic pattern still noted  Non reciprocal pattern preferred in clinic, but able to demo reciprocal with HR    Assessment:       Today is a progress check for Brent. Brent has had difficulties attending

## 2025-04-15 NOTE — FLOWSHEET NOTE
breaking his hip. He spent time in inpatient rehab and with home ronda prior to this encounter. He reports greatest difficulties are still stair negotiation, ambulation, and pain management. Upon assessment, he demonstrates left>right LE weakness. Left hip ROM is mildly limited at this time compared to his unaffected. He would benefit from ongoing skilled physical therapy to address deficits, return to PLOF, and reduce  risk for further functional decline.        Subjective:   \"I have been doing pretty good, I rode my bike a lot yesterday so  my muscles are pretty sore. I still walk with a bit of a limp too. I am back to doing everything I was doing and then some. I think we can make today last one. I will get to doing more with the weather being nicer too.  \"     L hip pain: 0 pain \"muscle soreness from riding bike ride\"         Any changes in Ambulatory Summary Sheet?  None        Objective:       4/15/25  5STS: 13.2s no UE   TU.8s , still mild antalgic gait with wider URI     LE MMT  Hip FLX B 4+/5  Hip ABD B 4+/5  Hip ADD B 5/5  Hip ER B 4+/5  Hip IR  B 4+/5  Knee FLX B 5/5  Knee EXT B 5/5  LEFS : 68/80     Arrived amb w/o A device with wide URI, mild antalgic pattern still noted  Non reciprocal pattern preferred in clinic, but able to demo reciprocal with HR          Exercises: (No more than 4 columns)   Exercise/Equipment 3/7/25 #1 Date  25 #2 4/15/25 #3           WARM UP         Nustep   L5 x 5' L5 x 5'         TABLE      *STS X5 no UE  1x10 no UE 5STS   *bridge  X10 5s hold  1x10 1x10 GTB   *SLR x10 2x10 1x10    *SL hip abd X10   1x10    *hooklying clam  RTB x10  RTB 2x10 GTB 2x10            STANDING      Step ups   Fwd 4\" 2x10 Full flight ,  X10 LLE 6\"    Lateral band walking in //       Shuttle    --                                PROPRIOCEPTION      Rhomberg   30\" x 2 no UE support -   Semi tandem   30\" x 2 R/L fwd no UE support -   Tandem  30\" R/L fwd intermittent touches -   Foam march   2x10

## 2025-04-16 NOTE — PLAN OF CARE
Patients Plan of Care was received and signed. Signed POC was scanned and placed in the patients chart.    Hannah Garcia

## 2025-08-18 DIAGNOSIS — G40.001 LOCALIZATION-RELATED (FOCAL) (PARTIAL) IDIOPATHIC EPILEPSY AND EPILEPTIC SYNDROMES WITH SEIZURES OF LOCALIZED ONSET, NOT INTRACTABLE, WITH STATUS EPILEPTICUS (HCC): ICD-10-CM

## 2025-08-20 RX ORDER — DIVALPROEX SODIUM 500 MG/1
TABLET, DELAYED RELEASE ORAL
Qty: 180 TABLET | Refills: 2 | Status: SHIPPED | OUTPATIENT
Start: 2025-08-20

## (undated) DEVICE — MARKER SURG SKIN UTIL REGULAR/FINE 2 TIP W/ RUL AND 9 LBL

## (undated) DEVICE — YANKAUER,FLEXIBLE HANDLE,REGLR CAPACITY: Brand: MEDLINE INDUSTRIES, INC.

## (undated) DEVICE — SUTURE VICRYL SZ 0 L27IN ABSRB UD L36MM CT-1 1/2 CIR J260H

## (undated) DEVICE — GLOVE SURG SZ 8 CRM LTX FREE POLYISOPRENE POLYMER BEAD ANTI

## (undated) DEVICE — COTTON UNDERCAST PADDING,CRIMPED FINISH: Brand: WEBRIL

## (undated) DEVICE — SUTURE ABSORBABLE BRAIDED 2-0 CT-1 27 IN UD VICRYL J259H

## (undated) DEVICE — GOWN,SIRUS,POLYRNF,BRTHSLV,XLN/XL,20/CS: Brand: MEDLINE

## (undated) DEVICE — TOWEL,OR,DSP,ST,BLUE,STD,6/PK,12PK/CS: Brand: MEDLINE

## (undated) DEVICE — BANDAGE,GAUZE,BULKEE II,4.5"X4.1YD,STRL: Brand: MEDLINE

## (undated) DEVICE — PAD,ABDOMINAL,5"X9",ST,LF,25/BX: Brand: MEDLINE INDUSTRIES, INC.

## (undated) DEVICE — Device

## (undated) DEVICE — GUIDEWIRE ORTH L400MM DIA3.2MM FOR TFN

## (undated) DEVICE — Z INACTIVE USE 2660663 SOLUTION IRRIG 1000ML STRIL H2O USP PLAS POUR BTL

## (undated) DEVICE — 3M™ MICROFOAM™ TAPE 1528-4: Brand: 3M™ MICROFOAM™

## (undated) DEVICE — DRAPE SHEET ULTRAGARD: Brand: MEDLINE

## (undated) DEVICE — PACK,BASIC,IX: Brand: MEDLINE

## (undated) DEVICE — BIT DRL L330MM DIA4.2MM CALIB 100MM 3 FLUT QUIK CPL

## (undated) DEVICE — MAT ABSRB W28XL48IN C6L FLR ULT W POLY BK

## (undated) DEVICE — ELECTRODE ES AD CRDLSS PT RET REM POLYHESIVE

## (undated) DEVICE — Device: Brand: COVER, PERINEAL POST, 12 PK

## (undated) DEVICE — 6617 IOBAN II PATIENT ISOLATION DRAPE 5/BX,4BX/CS: Brand: STERI-DRAPE™ IOBAN™ 2

## (undated) DEVICE — DRESSING HYDROFIBER AQUACEL AG ADVANTAGE 3.5X6 IN

## (undated) DEVICE — INTENDED FOR TISSUE SEPARATION, AND OTHER PROCEDURES THAT REQUIRE A SHARP SURGICAL BLADE TO PUNCTURE OR CUT.: Brand: BARD-PARKER ® STAINLESS STEEL BLADES

## (undated) DEVICE — GLOVE ORANGE PI 7 1/2   MSG9075

## (undated) DEVICE — SPONGE GZ W4XL8IN COT WVN 12 PLY

## (undated) DEVICE — COUNTER NDL 30 COUNT FOAM STRP SGL MAG

## (undated) DEVICE — GLOVE SURG SZ 8 L12IN THK75MIL DK GRN LTX FREE

## (undated) DEVICE — SPONGE LAP W18XL18IN WHT COT 4 PLY FLD STRUNG RADPQ DISP ST 2 PER PACK

## (undated) DEVICE — DRESSING HYDROFIBER AQUACEL AG ADVANT 3.5X4 IN

## (undated) DEVICE — SOLUTION IV 1000ML 0.9% SOD CHL FOR IRRIG PLAS CONT

## (undated) DEVICE — SYRINGE IRRIG 60ML SFT PLIABLE BLB EZ TO GRP 1 HND USE W/

## (undated) DEVICE — ROD RMR L950MM DIA2.5MM W/ EXTN BALL TIP